# Patient Record
Sex: MALE | Race: WHITE | NOT HISPANIC OR LATINO | Employment: FULL TIME | URBAN - METROPOLITAN AREA
[De-identification: names, ages, dates, MRNs, and addresses within clinical notes are randomized per-mention and may not be internally consistent; named-entity substitution may affect disease eponyms.]

---

## 2018-08-06 ENCOUNTER — HOSPITAL ENCOUNTER (EMERGENCY)
Facility: HOSPITAL | Age: 43
Discharge: HOME | End: 2018-08-06
Admitting: EMERGENCY MEDICINE

## 2018-08-06 VITALS
DIASTOLIC BLOOD PRESSURE: 91 MMHG | HEIGHT: 74 IN | HEART RATE: 92 BPM | TEMPERATURE: 97.9 F | BODY MASS INDEX: 28.23 KG/M2 | SYSTOLIC BLOOD PRESSURE: 155 MMHG | WEIGHT: 220 LBS | RESPIRATION RATE: 18 BRPM | OXYGEN SATURATION: 97 %

## 2018-08-06 DIAGNOSIS — M77.02 MEDIAL EPICONDYLITIS OF ELBOW, LEFT: Primary | ICD-10-CM

## 2018-08-06 PROCEDURE — 96372 THER/PROPH/DIAG INJ SC/IM: CPT

## 2018-08-06 PROCEDURE — 99283 EMERGENCY DEPT VISIT LOW MDM: CPT

## 2018-08-06 PROCEDURE — 93005 ELECTROCARDIOGRAM TRACING: CPT

## 2018-08-06 RX ORDER — KETOROLAC TROMETHAMINE 30 MG/ML
30 INJECTION, SOLUTION INTRAMUSCULAR; INTRAVENOUS ONCE
Status: COMPLETED | OUTPATIENT
Start: 2018-08-06 | End: 2018-08-06

## 2018-08-06 RX ORDER — HYDROCODONE BITARTRATE AND ACETAMINOPHEN 5; 325 MG/1; MG/1
1 TABLET ORAL EVERY 6 HOURS PRN
Qty: 20 TABLET | Refills: 0 | Status: SHIPPED | OUTPATIENT
Start: 2018-08-06 | End: 2018-08-16

## 2018-08-06 RX ORDER — NAPROXEN 500 MG/1
500 TABLET ORAL 2 TIMES DAILY WITH MEALS
Qty: 10 TABLET | Refills: 0 | Status: SHIPPED | OUTPATIENT
Start: 2018-08-06 | End: 2019-02-14 | Stop reason: ALTCHOICE

## 2018-08-06 RX ORDER — LIDOCAINE 50 MG/G
1 PATCH TOPICAL ONCE
Status: DISCONTINUED | OUTPATIENT
Start: 2018-08-06 | End: 2018-08-06 | Stop reason: HOSPADM

## 2018-08-06 RX ORDER — HYDROCODONE BITARTRATE AND ACETAMINOPHEN 5; 325 MG/1; MG/1
1 TABLET ORAL ONCE
Status: COMPLETED | OUTPATIENT
Start: 2018-08-06 | End: 2018-08-06

## 2018-08-06 RX ADMIN — KETOROLAC TROMETHAMINE 30 MG: 30 INJECTION, SOLUTION INTRAMUSCULAR at 07:03

## 2018-08-06 RX ADMIN — HYDROCODONE BITARTRATE AND ACETAMINOPHEN 1 TABLET: 5; 325 TABLET ORAL at 07:00

## 2018-08-06 RX ADMIN — LIDOCAINE 1 PATCH: 50 PATCH TOPICAL at 07:02

## 2018-08-06 NOTE — ED PROVIDER NOTES
History  Chief Complaint   Patient presents with    Arm Pain     Patient stated that he woke up this morning around 0230 with left arm pain  denied injury     80-year-old male patient presents to the emergency department with a chief complaint of left elbow pain  He appears to be moderately to severely uncomfortable he denies any trauma  He does do personal care reportedly  He has significant amount of tenderness over the left medial epicondyle suggestive of epicondylitis  Has associated radicular pain as well on the ulnar nerve  None       History reviewed  No pertinent past medical history  Past Surgical History:   Procedure Laterality Date    BACK SURGERY      HERNIA REPAIR      KNEE ARTHROSCOPY      ROTATOR CUFF REPAIR      TONSILLECTOMY         History reviewed  No pertinent family history  I have reviewed and agree with the history as documented  Social History   Substance Use Topics    Smoking status: Current Every Day Smoker     Packs/day: 0 50     Types: Cigarettes    Smokeless tobacco: Never Used    Alcohol use No        Review of Systems   Constitutional: Negative for diaphoresis, fatigue and fever  HENT: Negative for congestion, ear pain, nosebleeds and sore throat  Eyes: Negative for photophobia, pain, discharge and visual disturbance  Respiratory: Negative for cough, choking, chest tightness, shortness of breath and wheezing  Cardiovascular: Negative for chest pain and palpitations  Gastrointestinal: Negative for abdominal distention, abdominal pain, diarrhea and vomiting  Genitourinary: Negative for dysuria, flank pain and frequency  Musculoskeletal: Negative for back pain, gait problem and joint swelling  Skin: Negative for color change and rash  Neurological: Negative for dizziness, syncope and headaches  Psychiatric/Behavioral: Negative for behavioral problems and confusion  The patient is not nervous/anxious      All other systems reviewed and are negative  Physical Exam  Physical Exam   Constitutional: He is oriented to person, place, and time  He appears well-developed and well-nourished  HENT:   Head: Normocephalic and atraumatic  Eyes: Pupils are equal, round, and reactive to light  Neck: Normal range of motion  Neck supple  Cardiovascular: Normal rate, regular rhythm, normal heart sounds and normal pulses  PMI is not displaced  Pulmonary/Chest: Effort normal and breath sounds normal  No respiratory distress  Abdominal: Soft  He exhibits no distension  There is no guarding  Musculoskeletal:        Left elbow: He exhibits decreased range of motion (Due to pain)  Tenderness found  Medial epicondyle tenderness noted  Lymphadenopathy:     He has no cervical adenopathy  Neurological: He is alert and oriented to person, place, and time  Skin: Skin is warm and dry  No rash noted  He is not diaphoretic  No pallor  Psychiatric: He has a normal mood and affect  Vitals reviewed        Vital Signs  ED Triage Vitals   Temperature Pulse Respirations Blood Pressure SpO2   08/06/18 0618 08/06/18 0617 08/06/18 0617 08/06/18 0617 08/06/18 0617   97 9 °F (36 6 °C) 104 (!) 11 (!) 193/118 99 %      Temp Source Heart Rate Source Patient Position - Orthostatic VS BP Location FiO2 (%)   08/06/18 0618 08/06/18 0617 08/06/18 0617 08/06/18 0617 --   Oral Monitor Lying Right arm       Pain Score       08/06/18 0617       Worst Possible Pain           Vitals:    08/06/18 0617 08/06/18 0619 08/06/18 0630   BP: (!) 193/118 (!) 173/94 155/91   Pulse: 104 102 100   Patient Position - Orthostatic VS: Lying Lying        Visual Acuity      ED Medications  Medications   lidocaine (LIDODERM) 5 % patch 1 patch (1 patch Transdermal Medication Applied 8/6/18 0702)   ketorolac (TORADOL) injection 30 mg (30 mg Intramuscular Given 8/6/18 0703)   HYDROcodone-acetaminophen (NORCO) 5-325 mg per tablet 1 tablet (1 tablet Oral Given 8/6/18 0700)       Diagnostic Studies  Results Reviewed     None                 No orders to display              Procedures  Procedures       Phone Contacts  ED Phone Contact    ED Course                               MDM  Number of Diagnoses or Management Options  Medial epicondylitis of elbow, left: new and requires workup  Diagnosis management comments: Moderate severe epicondylitis recommend orthopedic brace as well as follow up with physical therapy and Sports Medicine  Patient Progress  Patient progress: stable    CritCare Time    Disposition  Final diagnoses:   Medial epicondylitis of elbow, left     Time reflects when diagnosis was documented in both MDM as applicable and the Disposition within this note     Time User Action Codes Description Comment    8/6/2018  6:46 AM Marck Stuart Add [M77 02] Medial epicondylitis of elbow, left       ED Disposition     ED Disposition Condition Comment    Discharge  Lacretia Snooks 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      Condition at discharge: Good        Follow-up Information     Follow up With Specialties Details Why [address], DO Sports Medicine Schedule an appointment as soon as possible for a visit For Continued Evaluation [address] [place] [address] [employer]  [address]  [facility] [address]  [phone]            Patient's Medications   Discharge Prescriptions    HYDROCODONE-ACETAMINOPHEN (NORCO) 5-325 MG PER TABLET    Take 1 tablet by mouth every 6 (six) hours as needed (Not relieved by Anti-inflammatory) for up to 10 days Max Daily Amount: 4 tablets       Start Date: 8/6/2018  End Date: 8/16/2018       Order Dose: 1 tablet       Quantity: 20 tablet    Refills: 0    NAPROXEN (NAPROSYN) 500 MG TABLET    Take 1 tablet (500 mg total) by mouth 2 (two) times a day with meals for 5 days       Start Date: 8/6/2018  End Date: 8/11/2018       Order Dose: 500 mg       Quantity: 10 tablet    Refills: 0       Outpatient Discharge Orders  Tennis elbow strap         ED Provider  Electronically Signed by           Herminio Montague  08/06/18 0165

## 2018-08-06 NOTE — DISCHARGE INSTRUCTIONS
Tennis Elbow   WHAT YOU NEED TO KNOW:   What is tennis elbow? Tennis elbow is inflammation of the tendons in your elbow  Tendons are strong tissues that connect muscle to bone  What causes tennis elbow? Tennis elbow is caused by overuse of the muscles in your forearm  These muscles are used to straighten your arm or lift your hand and wrist  Fast, repeated arm movements can lead to inflammation and small tears in your tendon  Tennis, painting, and manual labor are common activities that can cause tennis elbow  What are the signs and symptoms of tennis elbow? · Pain on the side of your elbow that travels to your upper arm, forearm, or fingers    · Weakness in your wrist or hand    · Trouble holding, lifting, or grabbing an object, such as a coffee cup    · Red, swollen, or warm skin on the outside of your elbow  How is tennis elbow diagnosed? Your healthcare provider will feel around your elbow to check for painful areas  He will check the movement of your elbow, wrist, and fingers  An x-ray, ultrasound, or MRI may show tendon damage  You may be given contrast liquid to help the tendons show up better in the pictures  Tell the healthcare provider if you have ever had an allergic reaction to contrast liquid  Do not enter the MRI room with anything metal  Metal can cause serious injury  Tell the healthcare provider if you have any metal in or on your body  How is tennis elbow treated? · Support devices  may be needed to limit your arm movement  Examples include an arm strap, brace, or splint  These devices also help decrease pain and prevent more damage to your tendon  · Acetaminophen  decreases pain and fever  It is available without a doctor's order  Ask how much to take and how often to take it  Follow directions   Read the labels of all other medicines you are using to see if they also contain acetaminophen, or ask your doctor or pharmacist  Acetaminophen can cause liver damage if not taken correctly  Do not use more than 4 grams (4,000 milligrams) total of acetaminophen in one day  · NSAIDs , such as ibuprofen, help decrease swelling, pain, and fever  This medicine is available with or without a doctor's order  NSAIDs can cause stomach bleeding or kidney problems in certain people  If you take blood thinner medicine, always ask your healthcare provider if NSAIDs are safe for you  Always read the medicine label and follow directions  · A steroid injection  will help decrease pain and swelling  · Physical therapy  may be recommended  A physical therapist teaches you exercises to help improve movement and strength, and to decrease pain  · Surgery  may be needed if your symptoms do not improve with other treatments  During surgery, your healthcare provider will remove any damaged tissue  He may also cut your tendon and reattach it  How can I manage my symptoms? · Rest  your injured arm and avoid activities that cause pain  This will help your tendons heal     · Apply ice  on your elbow for 15 to 20 minutes every hour or as directed  Use an ice pack, or put crushed ice in a plastic bag  Cover it with a towel before you apply it to your skin  Ice helps prevent tissue damage and decreases swelling and pain  · Elevate  your elbow above the level of your heart as often as you can  This will help decrease swelling and pain  Prop your elbow on pillows or blankets to keep it elevated comfortably  When should I seek immediate care? · You suddenly have no feeling in your arm, hand, or fingers  · You suddenly cannot move your arm, wrist, hand, or fingers  When should I contact my healthcare provider? · Your symptoms do not get better within 2 weeks, even with treatment  · You have more pain or weakness in your arm, wrist, hand, or fingers  · You have new numbness or tingling in your arm, hand, or fingers  · You have questions or concerns about your condition or care    CARE AGREEMENT:   You have the right to help plan your care  Learn about your health condition and how it may be treated  Discuss treatment options with your caregivers to decide what care you want to receive  You always have the right to refuse treatment  The above information is an  only  It is not intended as medical advice for individual conditions or treatments  Talk to your doctor, nurse or pharmacist before following any medical regimen to see if it is safe and effective for you  © 2017 [facility] [employer] Information is for End User's use only and may not be sold, redistributed or otherwise used for commercial purposes  All illustrations and images included in CareNotes® are the copyrighted property of [employer]  or Braden Trevino

## 2018-08-07 LAB
ATRIAL RATE: 105 BPM
P AXIS: 75 DEGREES
PR INTERVAL: 122 MS
QRS AXIS: 83 DEGREES
QRSD INTERVAL: 94 MS
QT INTERVAL: 344 MS
QTC INTERVAL: 454 MS
T WAVE AXIS: 55 DEGREES
VENTRICULAR RATE: 105 BPM

## 2018-08-07 PROCEDURE — 93010 ELECTROCARDIOGRAM REPORT: CPT | Performed by: INTERNAL MEDICINE

## 2018-11-04 ENCOUNTER — HOSPITAL ENCOUNTER (EMERGENCY)
Facility: HOSPITAL | Age: 43
Discharge: HOME | End: 2018-11-04
Attending: EMERGENCY MEDICINE | Admitting: EMERGENCY MEDICINE
Payer: COMMERCIAL

## 2018-11-04 VITALS
TEMPERATURE: 98.5 F | SYSTOLIC BLOOD PRESSURE: 181 MMHG | DIASTOLIC BLOOD PRESSURE: 100 MMHG | OXYGEN SATURATION: 100 % | HEART RATE: 116 BPM | RESPIRATION RATE: 18 BRPM

## 2018-11-04 DIAGNOSIS — L02.412 ABSCESS OF AXILLA, LEFT: Primary | ICD-10-CM

## 2018-11-04 PROCEDURE — 99282 EMERGENCY DEPT VISIT SF MDM: CPT

## 2018-11-04 RX ORDER — CEPHALEXIN 500 MG/1
500 CAPSULE ORAL 2 TIMES DAILY
Qty: 20 CAPSULE | Refills: 0 | Status: SHIPPED | OUTPATIENT
Start: 2018-11-04 | End: 2018-11-14

## 2018-11-04 RX ORDER — CEPHALEXIN 250 MG/1
500 CAPSULE ORAL ONCE
Status: COMPLETED | OUTPATIENT
Start: 2018-11-04 | End: 2018-11-04

## 2018-11-04 RX ORDER — ACETAMINOPHEN 325 MG/1
650 TABLET ORAL ONCE
Status: COMPLETED | OUTPATIENT
Start: 2018-11-04 | End: 2018-11-04

## 2018-11-04 RX ORDER — LIDOCAINE 40 MG/G
CREAM TOPICAL ONCE
Status: COMPLETED | OUTPATIENT
Start: 2018-11-04 | End: 2018-11-04

## 2018-11-04 RX ORDER — SULFAMETHOXAZOLE AND TRIMETHOPRIM 800; 160 MG/1; MG/1
1 TABLET ORAL 2 TIMES DAILY
Qty: 20 TABLET | Refills: 0 | Status: SHIPPED | OUTPATIENT
Start: 2018-11-04 | End: 2018-11-14

## 2018-11-04 RX ORDER — LIDOCAINE HYDROCHLORIDE 10 MG/ML
5 INJECTION, SOLUTION EPIDURAL; INFILTRATION; INTRACAUDAL; PERINEURAL ONCE
Status: COMPLETED | OUTPATIENT
Start: 2018-11-04 | End: 2018-11-04

## 2018-11-04 RX ORDER — IBUPROFEN 600 MG/1
600 TABLET ORAL ONCE
Status: COMPLETED | OUTPATIENT
Start: 2018-11-04 | End: 2018-11-04

## 2018-11-04 RX ORDER — SULFAMETHOXAZOLE AND TRIMETHOPRIM 800; 160 MG/1; MG/1
1 TABLET ORAL ONCE
Status: COMPLETED | OUTPATIENT
Start: 2018-11-04 | End: 2018-11-04

## 2018-11-04 RX ORDER — IBUPROFEN 600 MG/1
600 TABLET ORAL EVERY 6 HOURS PRN
Qty: 30 TABLET | Refills: 0 | Status: SHIPPED | OUTPATIENT
Start: 2018-11-04 | End: 2019-01-10

## 2018-11-04 RX ADMIN — LIDOCAINE HYDROCHLORIDE 5 ML: 10 INJECTION, SOLUTION EPIDURAL; INFILTRATION; INTRACAUDAL; PERINEURAL at 17:51

## 2018-11-04 RX ADMIN — SULFAMETHOXAZOLE AND TRIMETHOPRIM 1 TABLET: 800; 160 TABLET ORAL at 17:51

## 2018-11-04 RX ADMIN — IBUPROFEN 600 MG: 600 TABLET ORAL at 17:51

## 2018-11-04 RX ADMIN — LIDOCAINE: 40 CREAM TOPICAL at 17:51

## 2018-11-04 RX ADMIN — CEPHALEXIN 500 MG: 250 CAPSULE ORAL at 17:51

## 2018-11-04 RX ADMIN — ACETAMINOPHEN 650 MG: 325 TABLET, FILM COATED ORAL at 17:51

## 2018-11-04 NOTE — DISCHARGE INSTRUCTIONS
Abscess   WHAT YOU NEED TO KNOW:   A warm compress may help your abscess drain  Your healthcare provider may make a cut in the abscess so it can drain  You may need surgery to remove an abscess that is on your hands or buttocks  DISCHARGE INSTRUCTIONS:   Return to the emergency department if:   · The area around your abscess becomes very painful, warm, or has red streaks  · You have a fever and chills  · Your heart is beating faster than usual      · You feel faint or confused  Contact your healthcare provider if:   · Your abscess gets bigger or does not get better  · Your abscess returns  · You have questions or concerns about your condition or care  Medicines: You may  need any of the following:  · Antibiotics  help treat a bacterial infection  · Acetaminophen  decreases pain and fever  It is available without a doctor's order  Ask how much to take and how often to take it  Follow directions  Acetaminophen can cause liver damage if not taken correctly  · NSAIDs , such as ibuprofen, help decrease swelling, pain, and fever  This medicine is available with or without a doctor's order  NSAIDs can cause stomach bleeding or kidney problems in certain people  If you take blood thinner medicine, always ask your healthcare provider if NSAIDs are safe for you  Always read the medicine label and follow directions  · Take your medicine as directed  Contact your healthcare provider if you think your medicine is not helping or if you have side effects  Tell him or her if you are allergic to any medicine  Keep a list of the medicines, vitamins, and herbs you take  Include the amounts, and when and why you take them  Bring the list or the pill bottles to follow-up visits  Carry your medicine list with you in case of an emergency  Self-care:   · Apply a warm compress to your abscess  This will help it open and drain  Wet a washcloth in warm, but not hot, water  Apply the compress for 10 minutes  Repeat this 4 times each day  Do not  press on an abscess or try to open it with a needle  You may push the bacteria deeper or into your blood  · Do not share your clothes, towels, or sheets with anyone  This can spread the infection to others  · Wash your hands often  This can help prevent the spread of germs  Use soap and water or an alcohol-based hand rub  Care for your wound after it is drained:   · Care for your wound as directed  If your healthcare provider says it is okay, carefully remove the bandage and gauze packing  You may need to soak the gauze to get it out of your wound  Clean your wound and the area around it as directed  Dry the area and put on new, clean bandages  Change your bandages when they get wet or dirty  · Ask your healthcare provider how to change the gauze in your wound  Keep track of how many pieces of gauze are placed inside the wound  Do not put too much packing in the wound  Do not pack the gauze too tightly in your wound  Follow up with your healthcare provider in 1 to 3 days: You may need to have your packing removed or your bandage changed  Write down your questions so you remember to ask them during your visits  © 2017 [address]  Information is for End User's use only and may not be sold, redistributed or otherwise used for commercial purposes  All illustrations and images included in CareNotes® are the copyrighted property of [employer]  or Braden Trevino  The above information is an  only  It is not intended as medical advice for individual conditions or treatments  Talk to your doctor, nurse or pharmacist before following any medical regimen to see if it is safe and effective for you

## 2018-11-04 NOTE — ED PROVIDER NOTES
History  Chief Complaint   Patient presents with    Abscess     abscess under L armpit onset last week        History provided by:  Patient  Abscess   Location:  Shoulder/arm  Shoulder/arm abscess location:  L axilla  Size:  3  Abscess quality: fluctuance    Red streaking: no    Duration:  1 week  Progression:  Worsening  Chronicity:  New  Context: not diabetes, not immunosuppression, not injected drug use, not insect bite/sting and not skin injury    Relieved by:  Nothing  Worsened by:  Nothing  Ineffective treatments:  None tried  Associated symptoms: no fever, no headaches, no nausea and no vomiting    Risk factors: no family hx of MRSA, no hx of MRSA and no prior abscess        Prior to Admission Medications   Prescriptions Last Dose Informant Patient Reported? Taking?   naproxen (NAPROSYN) 500 mg tablet   No No   Sig: Take 1 tablet (500 mg total) by mouth 2 (two) times a day with meals for 5 days      Facility-Administered Medications: None       History reviewed  No pertinent past medical history  Past Surgical History:   Procedure Laterality Date    BACK SURGERY      HERNIA REPAIR      KNEE ARTHROSCOPY      ROTATOR CUFF REPAIR      TONSILLECTOMY         History reviewed  No pertinent family history  I have reviewed and agree with the history as documented  Social History   Substance Use Topics    Smoking status: Current Every Day Smoker     Packs/day: 0 50     Types: Cigarettes    Smokeless tobacco: Never Used    Alcohol use No        Review of Systems   Constitutional: Negative for fever  Gastrointestinal: Negative for nausea and vomiting  Neurological: Negative for headaches  All other systems reviewed and are negative  Physical Exam  Physical Exam   Constitutional: He appears well-developed and well-nourished  HENT:   Head: Normocephalic  Eyes: Pupils are equal, round, and reactive to light  EOM are normal    Neck: Neck supple  Cardiovascular: Regular rhythm    Tachycardia present  Pulmonary/Chest: Effort normal and breath sounds normal  No respiratory distress  He has no wheezes  Abdominal: Soft  Neurological: He is alert  Skin: Lesion (left axilla 4cm with fluctuance, without proximal streaking ) noted  No abrasion and no ecchymosis noted  Vitals reviewed        Vital Signs  ED Triage Vitals   Temperature Pulse Respirations Blood Pressure SpO2   11/04/18 1705 11/04/18 1704 11/04/18 1704 11/04/18 1704 11/04/18 1704   98 5 °F (36 9 °C) (!) 116 18 (!) 181/100 100 %      Temp Source Heart Rate Source Patient Position - Orthostatic VS BP Location FiO2 (%)   11/04/18 1705 11/04/18 1704 11/04/18 1704 11/04/18 1704 --   Oral Monitor Sitting Right arm       Pain Score       11/04/18 1704       Worst Possible Pain           Vitals:    11/04/18 1704   BP: (!) 181/100   Pulse: (!) 116   Patient Position - Orthostatic VS: Sitting       Visual Acuity      ED Medications  Medications   lidocaine (LMX) 4 % cream ( Topical Given 11/4/18 1751)   lidocaine (PF) (XYLOCAINE-MPF) 1 % injection 5 mL (5 mL Infiltration Given 11/4/18 1751)   cephalexin (KEFLEX) capsule 500 mg (500 mg Oral Given 11/4/18 1751)   sulfamethoxazole-trimethoprim (BACTRIM DS) 800-160 mg per tablet 1 tablet (1 tablet Oral Given 11/4/18 1751)   ibuprofen (MOTRIN) tablet 600 mg (600 mg Oral Given 11/4/18 1751)   acetaminophen (TYLENOL) tablet 650 mg (650 mg Oral Given 11/4/18 1751)       Diagnostic Studies  Results Reviewed     None                 No orders to display              Procedures  Incision/Drainage  Date/Time: 11/4/2018 6:07 PM  Performed by: Ame Jang by: Patricia Martini     Patient location:  ED  Consent:     Consent obtained:  Verbal    Consent given by:  Patient  Universal protocol:     Patient identity confirmed:  Verbally with patient  Location:     Type:  Abscess    Size:  4    Location:  Upper extremity    Upper extremity location:  L arm (left axilla)  Pre-procedure details: Skin preparation:  Betadine  Anesthesia (see MAR for exact dosages): Anesthesia method:  Topical application and local infiltration    Topical anesthetic:  Lidocaine gel    Local anesthetic:  Lidocaine 1% w/o epi  Procedure details:     Complexity:  Simple    Incision types:  Stab incision    Scalpel blade:  10    Incision depth:  Skin    Wound management:  Probed and deloculated    Drainage:  Purulent    Drainage amount: Moderate    Wound treatment:  Wound left open    Packing materials:  None  Post-procedure details:     Patient tolerance of procedure: Tolerated well, no immediate complications             Phone Contacts  ED Phone Contact    ED Course                               MDM  Number of Diagnoses or Management Options  Abscess of axilla, left: new and does not require workup    CritCare Time    Disposition  Final diagnoses:   Abscess of axilla, left     Time reflects when diagnosis was documented in both MDM as applicable and the Disposition within this note     Time User Action Codes Description Comment    11/4/2018  6:06 PM Ofelia MIRELES Add [L02 [id number]] Abscess of axilla, left       ED Disposition     ED Disposition Condition Comment    Discharge  Dru Driver 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      Condition at discharge: Good        Follow-up Information     Follow up With Specialties Details Why Contact Info Additional [address] [facility] Emergency Medicine  If symptoms worsen [address] [facility] [address]  [phone] [place] [facility], [address] [place] [address], [facility]          Patient's Medications   Discharge Prescriptions    CEPHALEXIN (KEFLEX) 500 MG CAPSULE    Take 1 capsule (500 mg total) by mouth 2 (two) times a day for 10 days       Start Date: 11/4/2018 End Date: 11/14/2018       Order Dose: 500 mg       Quantity: 20 capsule    Refills: 0    IBUPROFEN (MOTRIN) 600 MG TABLET    Take 1 tablet (600 mg total) by mouth every 6 (six) hours as needed for mild pain for up to 10 days       Start Date: 11/4/2018 End Date: 11/14/2018       Order Dose: 600 mg       Quantity: 30 tablet    Refills: 0    SULFAMETHOXAZOLE-TRIMETHOPRIM (BACTRIM DS) 800-160 MG PER TABLET    Take 1 tablet by mouth 2 (two) times a day for 10 days smx-tmp DS (BACTRIM) 800-160 mg tabs (1tab q12 D10)       Start Date: 11/4/2018 End Date: 11/14/2018       Order Dose: 1 tablet       Quantity: 20 tablet    Refills: 0     No discharge procedures on file      ED Provider  Electronically Signed by           Bharathi Novak MD  11/04/18 0238

## 2019-01-07 ENCOUNTER — APPOINTMENT (EMERGENCY)
Dept: RADIOLOGY | Facility: HOSPITAL | Age: 44
End: 2019-01-07
Payer: COMMERCIAL

## 2019-01-07 ENCOUNTER — HOSPITAL ENCOUNTER (EMERGENCY)
Facility: HOSPITAL | Age: 44
Discharge: HOME | End: 2019-01-07
Attending: EMERGENCY MEDICINE
Payer: COMMERCIAL

## 2019-01-07 VITALS
HEIGHT: 74 IN | TEMPERATURE: 97.8 F | HEART RATE: 96 BPM | SYSTOLIC BLOOD PRESSURE: 145 MMHG | WEIGHT: 219.8 LBS | RESPIRATION RATE: 18 BRPM | OXYGEN SATURATION: 97 % | BODY MASS INDEX: 28.21 KG/M2 | DIASTOLIC BLOOD PRESSURE: 95 MMHG

## 2019-01-07 DIAGNOSIS — M54.12 CERVICAL RADICULITIS: Primary | ICD-10-CM

## 2019-01-07 DIAGNOSIS — M25.522 LEFT ELBOW PAIN: ICD-10-CM

## 2019-01-07 PROCEDURE — 73080 X-RAY EXAM OF ELBOW: CPT

## 2019-01-07 PROCEDURE — 99283 EMERGENCY DEPT VISIT LOW MDM: CPT

## 2019-01-07 PROCEDURE — 96372 THER/PROPH/DIAG INJ SC/IM: CPT

## 2019-01-07 RX ORDER — METHOCARBAMOL 750 MG/1
750 TABLET, FILM COATED ORAL EVERY 6 HOURS PRN
Qty: 20 TABLET | Refills: 0 | Status: SHIPPED | OUTPATIENT
Start: 2019-01-07 | End: 2019-02-14 | Stop reason: ALTCHOICE

## 2019-01-07 RX ORDER — PREDNISONE 20 MG/1
40 TABLET ORAL ONCE
Status: COMPLETED | OUTPATIENT
Start: 2019-01-07 | End: 2019-01-07

## 2019-01-07 RX ORDER — OXYCODONE HYDROCHLORIDE AND ACETAMINOPHEN 5; 325 MG/1; MG/1
1 TABLET ORAL ONCE
Status: COMPLETED | OUTPATIENT
Start: 2019-01-07 | End: 2019-01-07

## 2019-01-07 RX ORDER — OXYCODONE HYDROCHLORIDE 10 MG/1
10 TABLET ORAL EVERY 6 HOURS PRN
Qty: 12 TABLET | Refills: 0 | Status: SHIPPED | OUTPATIENT
Start: 2019-01-07 | End: 2019-01-17

## 2019-01-07 RX ORDER — KETOROLAC TROMETHAMINE 30 MG/ML
30 INJECTION, SOLUTION INTRAMUSCULAR; INTRAVENOUS ONCE
Status: COMPLETED | OUTPATIENT
Start: 2019-01-07 | End: 2019-01-07

## 2019-01-07 RX ORDER — HYDROMORPHONE HCL/PF 1 MG/ML
0.5 SYRINGE (ML) INJECTION ONCE
Status: COMPLETED | OUTPATIENT
Start: 2019-01-07 | End: 2019-01-07

## 2019-01-07 RX ORDER — PREDNISONE 1 MG/1
TABLET ORAL
Qty: 21 TABLET | Refills: 0 | Status: SHIPPED | OUTPATIENT
Start: 2019-01-07 | End: 2019-02-14

## 2019-01-07 RX ADMIN — HYDROMORPHONE HYDROCHLORIDE 0.5 MG: 1 INJECTION, SOLUTION INTRAMUSCULAR; INTRAVENOUS; SUBCUTANEOUS at 15:52

## 2019-01-07 RX ADMIN — PREDNISONE 40 MG: 20 TABLET ORAL at 15:51

## 2019-01-07 RX ADMIN — OXYCODONE HYDROCHLORIDE AND ACETAMINOPHEN 1 TABLET: 5; 325 TABLET ORAL at 14:37

## 2019-01-07 RX ADMIN — KETOROLAC TROMETHAMINE 30 MG: 30 INJECTION, SOLUTION INTRAMUSCULAR at 15:51

## 2019-01-07 NOTE — DISCHARGE INSTRUCTIONS
Cervical Radiculopathy   WHAT YOU NEED TO KNOW:   Cervical radiculopathy is a painful condition that happens when a spinal nerve in your neck is pinched or irritated  DISCHARGE INSTRUCTIONS:   Medicines: You may need any of the following:  · NSAIDs  help decrease swelling and pain  This medicine can be bought without a doctor's order  This medicine can cause stomach bleeding or kidney problems in certain people  If you take blood thinner medicine, always ask your healthcare provider if NSAIDs are safe for you  Always read the medicine label and follow the directions on it before using this medicine  · Prescription pain medicine  helps decrease pain  Do not wait until the pain is severe before you take this medicine  · Steroids  help decrease pain and swelling  These may be given as a pill or as an injection in your neck  You may need more than 1 injection if your symptoms do not improve after the first treatment  · Take your medicine as directed  Contact your healthcare provider if you think your medicine is not helping or if you have side effects  Tell him of her if you are allergic to any medicine  Keep a list of the medicines, vitamins, and herbs you take  Include the amounts, and when and why you take them  Bring the list or the pill bottles to follow-up visits  Carry your medicine list with you in case of an emergency  Follow up with your healthcare provider or spine specialist as directed:  Write down your questions so you remember to ask them during your visits  Physical therapy:  Your healthcare provider may suggest physical therapy to stretch and strengthen your muscles  Your physical therapist can teach you how to improve your posture and the way you hold your neck  He may also teach you how to be safely active and avoid further injury  He can also help you develop an exercise program that is safe for your back and neck  Self-care:   · Ice  helps decrease swelling and pain   Ice may also help prevent tissue damage  Use an ice pack, or put crushed ice in a plastic bag  Cover it with a towel and place it on your neck for 15 to 20 minutes every hour or as directed  · Rest  when you feel it is needed  Slowly start to do more each day  Return to your daily activities as directed  · Wear a soft collar  You may be given a soft collar to support your neck while you sleep  Wear the soft collar only as directed  · Do light stretches and regular exercise  Your healthcare provider may suggest light stretches to help decrease stiffness in your neck and arm as you recover  After your pain is controlled, you may benefit from regular exercise  Ask what type of exercise is safe for your back and neck  · Review your work area  A comfortable work area can help prevent neck strain  Ask your employer for an ergonomic review to check the position of your desk, chair, phone, and computer  Make any necessary adjustments for your comfort  Contact your healthcare provider or spine specialist if:   · You have a fever  · You are losing weight without trying  · Your pain is worse, even with medicine  · One or both hands feel more numb than before, or you cannot move your fingers well  · You have questions or concerns about your condition or care  © 2017 [employer]  Information is for End User's use only and may not be sold, redistributed or otherwise used for commercial purposes  All illustrations and images included in CareNotes® are the copyrighted property of [email or web address]  or Braden Trevino  The above information is an  only  It is not intended as medical advice for individual conditions or treatments  Talk to your doctor, nurse or pharmacist before following any medical regimen to see if it is safe and effective for you        Cubital Tunnel Syndrome   WHAT YOU NEED TO KNOW:   Cubital tunnel syndrome is a condition where there is increased pressure on the ulnar nerve in your elbow  The ulnar nerve controls muscles and feeling in the hand  Cubital tunnel syndrome may be caused by direct pressure, stretching, or decreased blood flow to the ulnar nerve  DISCHARGE INSTRUCTIONS:   Medicines:   · NSAIDs:  These medicines decrease swelling and pain  NSAIDs are available without a doctor's order  Ask which medicine is right for you and how much to take  Take as directed  NSAIDs can cause stomach bleeding or kidney problems if not taken correctly  · Take your medicine as directed  Contact your healthcare provider if you think your medicine is not helping or if you have side effects  Tell him of her if you are allergic to any medicine  Keep a list of the medicines, vitamins, and herbs you take  Include the amounts, and when and why you take them  Bring the list or the pill bottles to follow-up visits  Carry your medicine list with you in case of an emergency  Follow up with your healthcare provider as directed:  Write down your questions so you remember to ask them during your visits  Manage your symptoms:   · Avoid putting pressure on your elbow:  Certain positions put pressure on the ulnar nerve in your elbow  Leaning or sleeping on your bent elbow can make your symptoms worse  · Apply ice:  Ice helps decrease swelling and pain  Ice may also help prevent tissue damage  Use an ice pack or put crushed ice in a plastic bag  Cover the ice pack with a towel and place it on the area for 15 to 20 minutes every hour  · Rest your arm:  You may need to rest your injured arm and avoid activities that cause your symptoms to allow your nerve to heal     · Get physical therapy:  A physical therapist can show you exercises to help improve movement and strength  Physical therapy can also help decrease pain and loss of function  · Use elbow splint or brace: You may need a brace or splint on your elbow to decrease your arm movement   This will help to keep pressure off your ulnar nerve  You may also need elbow pads to protect your elbow  Contact your healthcare provider if:   · Your symptoms get worse  · Your hand and fingers are so weak that you cannot grab, squeeze, or lift items  · You have questions or concerns about your condition or care  Return to the emergency department if:   · You suddenly lose feeling in your hand or fingers  · You cannot move your ring or little finger  © 2017 [facility] [facility] Information is for End User's use only and may not be sold, redistributed or otherwise used for commercial purposes  All illustrations and images included in CareNotes® are the copyrighted property of [employer]  or Braden Trevino  The above information is an  only  It is not intended as medical advice for individual conditions or treatments  Talk to your doctor, nurse or pharmacist before following any medical regimen to see if it is safe and effective for you

## 2019-01-07 NOTE — ED PROVIDER NOTES
History  Chief Complaint   Patient presents with    Arm Pain     Pt reports severe left arm pain  Radiates down into hand  Denies injury   Shoulder Pain     37 y o  male with no significant past medical history presents to ED with chief complaint of left elbow pain  Onset of symptoms reported as 2 days ago  Location of symptoms reported as left elbow  Quality is reported as sharp pain  Severity is reported as moderate to severe  Associated symptoms: denies paralysis, paraesthesias or weakness to left upper extremity, denies neck pain, denies headache, denies rash, denies fevers,  Reports left shoulder pain which has resolved  Reports left elbow pain occasionally radiated into left hand  Modifying factors: movement exacerbates pain  Context: patient reports he is right hand dominant  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  Denies any acute fall/injury/trauma to the area  Reports increasing pain in left elbow for the past 2 days, reports that he had some left sided neck/upper back and left shoulder pain a few days preceding current symptoms  Reports a similar episode of tennis elbow in the recent past  Tried OTC ibuprofen without relief of symptoms   Reviewed past medical history and visits via EPIC:  Patient last seen in ED on 11/4/2018 for treatment of left axillary abscess  History provided by:  Patient and relative   used: No        Prior to Admission Medications   Prescriptions Last Dose Informant Patient Reported? Taking?   naproxen (NAPROSYN) 500 mg tablet   No No   Sig: Take 1 tablet (500 mg total) by mouth 2 (two) times a day with meals for 5 days      Facility-Administered Medications: None       History reviewed  No pertinent past medical history  Past Surgical History:   Procedure Laterality Date    BACK SURGERY      HERNIA REPAIR      KNEE ARTHROSCOPY      ROTATOR CUFF REPAIR      TONSILLECTOMY         History reviewed   No pertinent family history  I have reviewed and agree with the history as documented  Social History   Substance Use Topics    Smoking status: Current Every Day Smoker     Packs/day: 0 50     Types: Cigarettes    Smokeless tobacco: Never Used    Alcohol use No        Review of Systems   Constitutional: Negative for activity change, appetite change, chills, diaphoresis, fatigue, fever and unexpected weight change  HENT: Negative for congestion, dental problem, drooling, ear discharge, ear pain, facial swelling, hearing loss, mouth sores, nosebleeds, postnasal drip, rhinorrhea, sinus pain, sinus pressure, sneezing, sore throat, tinnitus, trouble swallowing and voice change  Eyes: Negative for photophobia, pain, discharge, redness, itching and visual disturbance  Respiratory: Negative for cough, chest tightness, shortness of breath and wheezing  Cardiovascular: Negative for chest pain, palpitations and leg swelling  Gastrointestinal: Negative for abdominal distention, abdominal pain, constipation, diarrhea, nausea and vomiting  Endocrine: Negative for cold intolerance, heat intolerance, polydipsia, polyphagia and polyuria  Genitourinary: Negative for difficulty urinating, dysuria, flank pain, frequency, hematuria and urgency  Musculoskeletal: Positive for arthralgias  Negative for back pain, gait problem, joint swelling, myalgias, neck pain and neck stiffness  Skin: Negative for color change, pallor, rash and wound  Allergic/Immunologic: Negative for environmental allergies, food allergies and immunocompromised state  Neurological: Negative for dizziness, tremors, seizures, syncope, facial asymmetry, speech difficulty, weakness, light-headedness, numbness and headaches  Hematological: Negative for adenopathy  Does not bruise/bleed easily  Psychiatric/Behavioral: Negative for agitation, confusion and hallucinations  The patient is not nervous/anxious      All other systems reviewed and are negative  Physical Exam  Physical Exam   Constitutional: He is oriented to person, place, and time  He appears well-developed and well-nourished  No distress  /95 (BP Location: Right arm)   Pulse 96   Temp 97 8 °F (36 6 °C) (Oral)   Resp 18   Ht 6' 2" (1 88 m)   Wt 99 7 kg (219 lb 12 8 oz)   SpO2 97%   BMI 28 22 kg/m²    HENT:   Head: Normocephalic and atraumatic  Right Ear: External ear normal    Left Ear: External ear normal    Nose: Nose normal    Mouth/Throat: Oropharynx is clear and moist  No oropharyngeal exudate  Eyes: Pupils are equal, round, and reactive to light  Conjunctivae and EOM are normal  Right eye exhibits no discharge  Left eye exhibits no discharge  No scleral icterus  Neck: Normal range of motion  Neck supple  No tracheal deviation present  No thyromegaly present  Cardiovascular: Normal rate, regular rhythm and intact distal pulses  Pulmonary/Chest: Effort normal and breath sounds normal  No stridor  No respiratory distress  He has no wheezes  He has no rales  He exhibits no tenderness  Abdominal: Soft  Bowel sounds are normal  He exhibits no distension and no mass  There is no tenderness  There is no rebound and no guarding  Musculoskeletal: He exhibits tenderness  He exhibits no edema or deformity  Left upper extremity: normal inspection, no gross deformity  No rashes/effusions/redness/warmth/joint swelling present  Distal neurovascular tendon intact to left hand  Left radial pulse 2/4 normal   Capillary refill less than 3 seconds to all fingers  Strength 5/5 normal to all fingers  Flex/ext tendon function fully intact to all fingers  Left hand and wrist are normal to inspection, nontender to palpation with FROM  Left elbow demonstrates normal inspection, no gross deformity  There is reproducible tenderness to palpation to ulnar surface of elbow  Olecranon and radial surface of elbow are nontender to palpation  ROM to elbow is limited by pain  Left shoulder I normal to inspection, nontender to palpation with FROM  All compartment of forearm are soft, warm, well perfused  Lymphadenopathy:     He has no cervical adenopathy  Neurological: He is alert and oriented to person, place, and time  He displays normal reflexes  No cranial nerve deficit or sensory deficit  He exhibits normal muscle tone  Coordination normal    Skin: Skin is warm and dry  Capillary refill takes less than 2 seconds  No rash noted  He is not diaphoretic  No erythema  No pallor  Psychiatric: He has a normal mood and affect  His behavior is normal  Judgment and thought content normal    Nursing note and vitals reviewed  Vital Signs  ED Triage Vitals [01/07/19 1308]   Temperature Pulse Respirations Blood Pressure SpO2   97 8 °F (36 6 °C) 97 19 (!) 148/101 98 %      Temp Source Heart Rate Source Patient Position - Orthostatic VS BP Location FiO2 (%)   Oral Monitor Sitting Right arm --      Pain Score       Worst Possible Pain           Vitals:    01/07/19 1308 01/07/19 1457   BP: (!) 148/101 145/95   Pulse: 97 96   Patient Position - Orthostatic VS: Sitting Sitting       Visual Acuity      ED Medications  Medications   oxyCODONE-acetaminophen (PERCOCET) 5-325 mg per tablet 1 tablet (1 tablet Oral Given 1/7/19 1437)   ketorolac (TORADOL) injection 30 mg (30 mg Intramuscular Given 1/7/19 1551)   HYDROmorphone (DILAUDID) injection 0 5 mg (0 5 mg Subcutaneous Given 1/7/19 1552)   predniSONE tablet 40 mg (40 mg Oral Given 1/7/19 1551)       Diagnostic Studies  Results Reviewed     None                 XR elbow 3+ views LEFT   Final Result by Santiago Hair MD (01/07 1540)      No acute osseous abnormality              Workstation performed: [id number]                    Procedures  Procedures       Phone Contacts  ED Phone Contact    ED Course                               MDM  Number of Diagnoses or Management Options  Cervical radiculitis: new and requires workup  Left elbow pain: new and requires workup  Diagnosis management comments: ddx includes but is not limited to fracture, contusion, sprain, strain, nerve injury, tendon injury, vascular injury, tendinitis, bursitis, dislocation, plan xray to rule out fracture or dislocation  Xray images left elbow independently visualized and interpreted by me - no acute fracture or dislocation  I reviewed all test results with patient at bedside  Patients symptoms of ulnar surface elbow pain without trauma that radiates intermittently down the ulnar side of forearm into fifth and part of 4th finger suggest ulnar nerve peripheral neuropathy  There is no motor weakness on exam  No signs of infection (nec fasciitis) or compartment syndrome  Patient does relate that a few days prior to elbow pain he has left sided shoulder and neck/upper back pain  Discussed with patient suspect cervical radiculitis as source of symptoms  Discussed pathophysiology of cervical radiculitis and peripheral neuropathy with patient  Discussed use of nsaids, analgesics and muscle relaxers for treatment  Discussed outpatient follow up with pcp and sports medicine in 2-3 days for further evaluation of symptoms  Extensively reviewed reasons to return to ED  Reviewed reasons to return to ed  Patient verbalized understanding of diagnosis and agreement with discharge plan of care as well as understanding of reasons to return to ed  Standard narcotic precautions given              Amount and/or Complexity of Data Reviewed  Tests in the radiology section of CPT®: ordered and reviewed  Discussion of test results with the performing providers: yes  Obtain history from someone other than the patient: yes (relative)  Review and summarize past medical records: yes  Independent visualization of images, tracings, or specimens: yes    Patient Progress  Patient progress: stable    CritCare Time    Disposition  Final diagnoses:   Cervical radiculitis   Left elbow pain Time reflects when diagnosis was documented in both MDM as applicable and the Disposition within this note     Time User Action Codes Description Comment    1/7/2019  3:39 PM Bernadette Narvaez Add [U23 64] Cervical radiculitis     1/7/2019  3:39 PM Bernadette Narvaez Add [M25 522] Left elbow pain       ED Disposition     ED Disposition Condition Comment    Discharge  Carolyn New 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      Condition at discharge: Stable        Follow-up Information     Follow up With Specialties Details Why Contact Info Additional Information    Vicki Vee MD  Call in 1 day for further evaluation of symptoms [address] [facility]  [address] [facility], [address] Call in 1 day for further evaluation of symptoms 200 [facility] [address]01 [address] [facility] [address] [facility]       Echo Oswald MD Orthopedic Surgery Call in 1 day for further evaluation of symptoms 300 [facility] [address]       [address]25 [facility] Orthopedic Surgery Call in 1 day for further evaluation of symptoms [address] Emergency Department Emergency Medicine Go to If symptoms worsen 100 [facility]  [phone] [place] ED, [address] [place], [place], [address]          Discharge Medication List as of 1/7/2019  3:42 PM      START taking these medications    Details   methocarbamol (ROBAXIN) 750 mg tablet Take 1 tablet (750 mg total) by mouth every 6 (six) hours as needed for muscle spasms for up to 5 days, Starting Mon 1/7/2019, Until Sat 1/12/2019, Print      oxyCODONE (ROXICODONE) 10 MG TABS Take 1 tablet (10 mg total) by mouth every 6 (six) hours as needed for moderate pain (cervical radiculitis/initial rx ) for up to 10 days Max Daily Amount: 40 mg, Starting Mon 1/7/2019, Until Thu 1/17/2019, Print predniSONE 5 mg tablet 40 mg PO day 1, then 30 mg PO day 2, 20 mg PO day 3, 10 mg PO day 4, 5 mg PO day 5 then stop, Print         CONTINUE these medications which have NOT CHANGED    Details   naproxen (NAPROSYN) 500 mg tablet Take 1 tablet (500 mg total) by mouth 2 (two) times a day with meals for 5 days, Starting Mon 8/6/2018, Until Sat 8/11/2018, Print      ibuprofen (MOTRIN) 600 mg tablet Take 1 tablet (600 mg total) by mouth every 6 (six) hours as needed for mild pain for up to 10 days, Starting Sun 11/4/2018, Until Wed 11/14/2018, Print           No discharge procedures on file      ED Provider  Electronically Signed by           REED Banuelos  01/10/19 9784

## 2019-01-07 NOTE — ED NOTES
D/c instructions and rx reviewed w/ pt  All questions and concerns addressed at this time         Catalina Woods, MINO  01/07/19 6280

## 2019-01-10 ENCOUNTER — TELEPHONE (OUTPATIENT)
Dept: OBGYN CLINIC | Facility: HOSPITAL | Age: 44
End: 2019-01-10

## 2019-01-10 ENCOUNTER — OFFICE VISIT (OUTPATIENT)
Dept: OBGYN CLINIC | Facility: CLINIC | Age: 44
End: 2019-01-10
Payer: COMMERCIAL

## 2019-01-10 VITALS
BODY MASS INDEX: 29.75 KG/M2 | WEIGHT: 231.8 LBS | HEIGHT: 74 IN | HEART RATE: 94 BPM | SYSTOLIC BLOOD PRESSURE: 131 MMHG | DIASTOLIC BLOOD PRESSURE: 94 MMHG

## 2019-01-10 DIAGNOSIS — G56.22 ULNAR NEURITIS, LEFT: Primary | ICD-10-CM

## 2019-01-10 PROCEDURE — 99203 OFFICE O/P NEW LOW 30 MIN: CPT | Performed by: ORTHOPAEDIC SURGERY

## 2019-01-10 RX ORDER — GABAPENTIN 300 MG/1
300 CAPSULE ORAL DAILY
Qty: 30 CAPSULE | Refills: 1 | Status: SHIPPED | OUTPATIENT
Start: 2019-01-10 | End: 2020-04-22

## 2019-01-10 NOTE — LETTER
January 10, 2019     Patient: Nitish Godinez   YOB: 1975   Date of Visit: 1/10/2019       To Whom it May Concern:    Nitish Godinez is under my professional care  He was seen in my office on 1/10/2019  He may return to work no use of left arm until cleared by physician  If you have any questions or concerns, please don't hesitate to call           Sincerely,          Jw Son MD        CC: No Recipients

## 2019-01-10 NOTE — PROGRESS NOTES
CHIEF COMPLAINT:  Chief Complaint   Patient presents with    Left Elbow - Pain       SUBJECTIVE:  Herbert Connor is a 37y o  year old LHD male who presents the office for evaluation of his left elbow  Patient states that last week while at work he was doing increased activity and felt severe pain in his left elbow  Patient states that has sharp pains in his forearm and palm as well  Patient denies numbness and tingling  Patient was seen in the emergency department on 01/07/2019 where he was prescribed prednisone which she is taking as prescribed  Patient has history of 2 left shoulder surgeries performed in 2016  Patient denies injury to his left elbow  PAST MEDICAL HISTORY:  History reviewed  No pertinent past medical history  PAST SURGICAL HISTORY:  Past Surgical History:   Procedure Laterality Date    BACK SURGERY      HERNIA REPAIR      KNEE ARTHROSCOPY      ROTATOR CUFF REPAIR      TONSILLECTOMY         FAMILY HISTORY:  History reviewed  No pertinent family history      SOCIAL HISTORY:  Social History   Substance Use Topics    Smoking status: Current Every Day Smoker     Packs/day: 0 50     Types: Cigarettes    Smokeless tobacco: Never Used    Alcohol use No       MEDICATIONS:    Current Outpatient Prescriptions:     methocarbamol (ROBAXIN) 750 mg tablet, Take 1 tablet (750 mg total) by mouth every 6 (six) hours as needed for muscle spasms for up to 5 days, Disp: 20 tablet, Rfl: 0    oxyCODONE (ROXICODONE) 10 MG TABS, Take 1 tablet (10 mg total) by mouth every 6 (six) hours as needed for moderate pain (cervical radiculitis/initial rx ) for up to 10 days Max Daily Amount: 40 mg, Disp: 12 tablet, Rfl: 0    predniSONE 5 mg tablet, 40 mg PO day 1, then 30 mg PO day 2, 20 mg PO day 3, 10 mg PO day 4, 5 mg PO day 5 then stop, Disp: 21 tablet, Rfl: 0    naproxen (NAPROSYN) 500 mg tablet, Take 1 tablet (500 mg total) by mouth 2 (two) times a day with meals for 5 days, Disp: 10 tablet, Rfl: 0    ALLERGIES:  No Known Allergies    REVIEW OF SYSTEMS:  Review of Systems   Constitutional: Negative for chills, fever and unexpected weight change  HENT: Negative for hearing loss, nosebleeds and sore throat  Eyes: Negative for pain, redness and visual disturbance  Respiratory: Negative for cough, shortness of breath and wheezing  Cardiovascular: Negative for chest pain, palpitations and leg swelling  Gastrointestinal: Negative for abdominal pain, nausea and vomiting  Endocrine: Negative for polydipsia and polyuria  Genitourinary: Negative for dysuria and hematuria  Musculoskeletal: Positive for arthralgias  Negative for joint swelling and myalgias  Skin: Negative for rash and wound  Neurological: Negative for light-headedness, numbness and headaches  Psychiatric/Behavioral: Positive for decreased concentration  Negative for dysphoric mood and suicidal ideas  The patient is not nervous/anxious          VITALS:  Vitals:    01/10/19 0928   BP: 131/94   Pulse: 94       LABS:  HgA1c: No results found for: HGBA1C  BMP: No results found for: GLUCOSE, CALCIUM, NA, K, CO2, CL, BUN, CREATININE    _____________________________________________________  PHYSICAL EXAMINATION:  General: well developed and well nourished, alert, oriented times 3 and appears comfortable  Psychiatric: Normal  HEENT: Trachea Midline, No torticollis  Pulmonary: No audible wheezing or respiratory distress   Skin: No masses, erthema, lacerations, fluctation, ulcerations  Neurovascular: Sensation Intact to the Median, Ulnar, Radial Nerve, Motor Intact to the Median, Ulnar, Radial Nerve and Pulses Intact    MUSCULOSKELETAL EXAMINATION:  Left elbow  No swelling erythema or ecchymosis  Positive Tinel's at the elbow  Tender over the medial aspect of the elbow      ___________________________________________________  STUDIES REVIEWED:  Images obtained on 1/7/19 of the Left elbow 3 views demonstrate No fracture or dislocation, osteophyte formation on the ulnar aspect of the elbow      PROCEDURES PERFORMED:  Procedures  No Procedures performed today    _____________________________________________________  ASSESSMENT/PLAN:    Left ulnar neuritis  * left upper extremity EMG was ordered  * left elbow MRI was ordered clicking for nerve abnormality  * patient was advised to take the B6  * patient was prescribed gabapentin 300 mg to take once a day at bedtime  * patient was given a note to return to work with no use of left arm  * patient will follow up in the office after EMG and MRI are performed        Follow Up:  Return after EMG MRI        To Do Next Visit:  Re-evaluation of current issue review EMG MRI        Scribe Attestation    I,:   Emiliana Tyler am acting as a scribe while in the presence of the attending physician :        I,:   Luis E Issa MD personally performed the services described in this documentation    as scribed in my presence :

## 2019-01-10 NOTE — TELEPHONE ENCOUNTER
Patient called and stated his Antonioton did not receive a script from his visit today with Dr Nilda Andino       Patient does not remeber what the script was called but it was suppose to be for his nerve in LT ELBOW    Patient would like to please be called when his script is complete at [phone]    Thank You

## 2019-01-14 NOTE — TELEPHONE ENCOUNTER
Patient  [phone]    Dr Lauryn Madrid    Patient called stating the people at MRI said that patient's insurance company didn't authorize MRI and told him to call to see what the next step was going to be

## 2019-01-15 ENCOUNTER — TELEPHONE (OUTPATIENT)
Dept: OBGYN CLINIC | Facility: CLINIC | Age: 44
End: 2019-01-15

## 2019-01-15 NOTE — TELEPHONE ENCOUNTER
I called the patient back  No answer  I left a message  The patient's chart says he already takes Naproxen bid  He can also take Tylenol 500mg 3 times a day  These medications can be purchased over the counter  I was not able to review any recent labs to look at liver function or kidney function  I do not see any past medical history in his chart that would limit him from taking NSAIDS or Tylenol (like blood thinners, previous heart attacks, kidney or liver issues, stomach ulcers, etc)  Please call the patient back to discuss  Thank you

## 2019-01-15 NOTE — TELEPHONE ENCOUNTER
I called sergio to make aware but he did not answer so I left a message  Patient has an appointment with Dr Chikis Stovall on 2/14/19 for EMG review

## 2019-01-15 NOTE — TELEPHONE ENCOUNTER
We saw this patient on 1/10 and ordered an EMG and MRI  The MRI was denied by his insurance  I did a peer to peer, but the EMG needs to be done first, before the insurance company will authorize an MRI  Please call the patient and let him know  Please schedule a f/u with Dr Sergio Bryant (unless he has one already) after the EMG to discuss results  We will re-evaluate the need for an MRI at that visit  I discussed this with Dr Sergio Bryant and he is aware  Thank you

## 2019-01-15 NOTE — TELEPHONE ENCOUNTER
Call to this patient at Mary's request there was no answer, left message for him to call back to [phone] to review her note

## 2019-01-16 NOTE — TELEPHONE ENCOUNTER
Call to this patient per him he is not taking the Naproxen   And he was told not to take the Ibuprofen, or any of that type of medications like that on his instructions, he would like for you or Dr Irving De Souza to give him a call today @ [phone]

## 2019-02-08 ENCOUNTER — PROCEDURE VISIT (OUTPATIENT)
Dept: NEUROLOGY | Facility: CLINIC | Age: 44
End: 2019-02-08
Payer: COMMERCIAL

## 2019-02-08 DIAGNOSIS — G56.22 ULNAR NEURITIS, LEFT: ICD-10-CM

## 2019-02-08 DIAGNOSIS — G56.22 ULNAR NEUROPATHY OF LEFT UPPER EXTREMITY: Primary | ICD-10-CM

## 2019-02-08 PROCEDURE — 95908 NRV CNDJ TST 3-4 STUDIES: CPT | Performed by: PSYCHIATRY & NEUROLOGY

## 2019-02-08 PROCEDURE — 95886 MUSC TEST DONE W/N TEST COMP: CPT | Performed by: PSYCHIATRY & NEUROLOGY

## 2019-02-08 NOTE — PROGRESS NOTES
EMG 1 Limb     Date/Time 2/8/2019 11:01 AM     Performed by  Maliha Rubio     Authorized by Lisa Cole            EMG LEFT UPPER EXTREMITY    Motor and sensory conduction studies were performed on the left median and ulnar nerves  The distal motor latencies were normal  The motor action potential amplitudes were normal  Motor conduction velocities were normal, except conduction of the ulnar nerve across the elbow which is prolonged at 29 m/sec  The left median and ulnar F waves were normal     Left median and ulnar sensory latencies were normal including median palmar stimulation with normal sensory action potential amplitudes  Concentric needle EMG was performed in various distal and proximal muscles of the left upper extremity including APB, FDI, EDC, brachial radialis, biceps, triceps in the low cervical paraspinal region  There was no evidence of spontaneous activity seen   The compound motor unit potentials were of normal configuration and interference patterns were full or full for effort    IMPRESSION: This is an abnormal EMG of the left upper extremity due to evidence of a localized neuropathic process involving the ulnar nerve at the elbow consistent with moderate to severe cubital tunnel syndrome with predominantly demyelinative change    ALINE Nelson

## 2019-02-14 ENCOUNTER — OFFICE VISIT (OUTPATIENT)
Dept: OBGYN CLINIC | Facility: CLINIC | Age: 44
End: 2019-02-14
Payer: COMMERCIAL

## 2019-02-14 VITALS
WEIGHT: 231 LBS | HEIGHT: 74 IN | BODY MASS INDEX: 29.65 KG/M2 | HEART RATE: 101 BPM | SYSTOLIC BLOOD PRESSURE: 143 MMHG | DIASTOLIC BLOOD PRESSURE: 87 MMHG

## 2019-02-14 DIAGNOSIS — G56.22 CUBITAL TUNNEL SYNDROME ON LEFT: Primary | ICD-10-CM

## 2019-02-14 PROCEDURE — 99214 OFFICE O/P EST MOD 30 MIN: CPT | Performed by: ORTHOPAEDIC SURGERY

## 2019-02-14 RX ORDER — HYDROCODONE BITARTRATE AND ACETAMINOPHEN 5; 325 MG/1; MG/1
1 TABLET ORAL EVERY 6 HOURS PRN
Qty: 20 TABLET | Refills: 0 | Status: SHIPPED | OUTPATIENT
Start: 2019-02-14 | End: 2019-02-24

## 2019-02-14 NOTE — PROGRESS NOTES
CHIEF COMPLAINT:  Chief Complaint   Patient presents with    Left Elbow - Follow-up       SUBJECTIVE:  Julio Shin is a 37y o  year old LHD male who presents to the office to review EMG of his left upper extremity which was ordered due to severe pain he has been having in his left forearm and palm  Pt states that he has continued pain id his elbow that shoots down his forearm into his palm  Pt states that he has some decreased sensation in his ring and small fingers  Pt again denies numbness and tingling  The patient denies any cardiac or pulmonary issues  Denies diabetes  Denies any history of MI, gastric ulcers, kidney or liver issues  Denies blood thinners  Pt was seen in the ED on 1/7/19 where he was prescribed prednisone  PAST MEDICAL HISTORY:  History reviewed  No pertinent past medical history  PAST SURGICAL HISTORY:  Past Surgical History:   Procedure Laterality Date    BACK SURGERY      HERNIA REPAIR      KNEE ARTHROSCOPY      ROTATOR CUFF REPAIR      TONSILLECTOMY         FAMILY HISTORY:  History reviewed  No pertinent family history  SOCIAL HISTORY:  Social History     Tobacco Use    Smoking status: Current Every Day Smoker     Packs/day: 0 50     Types: Cigarettes    Smokeless tobacco: Never Used   Substance Use Topics    Alcohol use: No    Drug use: No       MEDICATIONS:    Current Outpatient Medications:     gabapentin (NEURONTIN) 300 mg capsule, Take 1 capsule (300 mg total) by mouth daily At bed time, Disp: 30 capsule, Rfl: 1    HYDROcodone-acetaminophen (NORCO) 5-325 mg per tablet, Take 1 tablet by mouth every 6 (six) hours as needed for pain for up to 10 days To be taken after surgeryMax Daily Amount: 4 tablets, Disp: 20 tablet, Rfl: 0    ALLERGIES:  No Known Allergies    REVIEW OF SYSTEMS:  Review of Systems   Constitutional: Negative for chills, fever and unexpected weight change  HENT: Negative for hearing loss, nosebleeds and sore throat      Eyes: Negative for pain, redness and visual disturbance  Respiratory: Negative for cough, shortness of breath and wheezing  Cardiovascular: Negative for chest pain, palpitations and leg swelling  Gastrointestinal: Negative for abdominal pain, nausea and vomiting  Endocrine: Negative for polydipsia and polyuria  Genitourinary: Negative for dysuria and hematuria  Musculoskeletal: Negative for arthralgias, joint swelling and myalgias  Skin: Negative for rash and wound  Neurological: Negative for light-headedness, numbness and headaches  Psychiatric/Behavioral: Negative for decreased concentration, dysphoric mood and suicidal ideas  The patient is not nervous/anxious  VITALS:  Vitals:    02/14/19 0820   BP: 143/87   Pulse: 101       LABS:  HgA1c: No results found for: HGBA1C  BMP: No results found for: GLUCOSE, CALCIUM, NA, K, CO2, CL, BUN, CREATININE    _____________________________________________________  PHYSICAL EXAMINATION:  General: well developed and well nourished, alert, oriented times 3 and appears comfortable  Psychiatric: Normal  HEENT: Trachea Midline, No torticollis  Cardiac:  Regular rate and rhythm  Pulmonary: No respiratory distress  Lung sounds clear to auscultation  Skin: No masses, erthema, lacerations, fluctation, ulcerations  Neurovascular: Sensation Intact to the Median, Ulnar, Radial Nerve, Motor Intact to the Median, Ulnar, Radial Nerve and Pulses Intact    MUSCULOSKELETAL EXAMINATION:  Left Ulnar Nerve Exam:    Negative intrinsic atrophy  Negative deformity at the elbow  Full range of motion with flexion and extension of the elbow  No subluxation of the ulnar nerve  Positive ulnar nerve compression test at the elbow  Positive tinels over the ulnar nerve at the elbow  Negative cross finger test in the long and index fingers fingers  FDP strength is 5/5 to the ring finger  FDP strength is 5/5 to the small finger    Intrinsic strength 4+/5      2pt discrimination is abnormal throughout the ulnar nerve distribution - unable to discern 1 from 2 points      ___________________________________________________  STUDIES REVIEWED:  EMG LUE performed 2/8/19 shows evidence of localized neuropathic process involving the ulnar nerve at the elbow consistent with moderate to severe cubital tunnel syndrome with predominantly demyelinative changes  PROCEDURES PERFORMED:  Procedures  No Procedures performed today    _____________________________________________________  ASSESSMENT/PLAN:    Left cubital tunnel   * Surgery- Left ulna nerve release insitu   * Detailed consent was signed in the office   * anesthesia IV sedation with local   * post op pain medication will be sent to pharmacy on file   * PT/OT order was placed    Surgery medication instructions: You will stop eating and drinking at midnight the night before your surgery, but you may continue to take your normal medications with a small sip of water  In the morning on the day of your surgery, we would like you to take the following medications (as long as you have never been told to avoid Tylenol or NSAIDs like ibuprofen, Naproxen, Aleve, Advil, etc):   Ibuprofen 600mg one tablet by mouth   Tylenol 500mg one tablet by mouth    After surgery, we would like you to take Ibuprofen 600mg one tablet by mouth every 6 hours with food (at breakfast, lunch and dinner)  AND Tylenol 500 mg one tablet by mouth every 6 hours  (at breakfast, lunch and dinner) for 5-7 days after your surgery  Please take these medication EVERYDAY after surgery for 5-7 days, and not just as needed  You can take these medications at the same time  Taking these medications after surgery will limit your need for prescription pain medication  We will also prescribe a narcotic pain medication for a limited time after surgery that you can take as needed for moderate or severe pain            Diagnoses and all orders for this visit:    Cubital tunnel syndrome on left  -     Ambulatory referral to PT/OT hand therapy  -     Case request operating room: RELEASE CUBITAL TUNNEL left; Standing  -     Case request operating room: RELEASE CUBITAL TUNNEL left  -     HYDROcodone-acetaminophen (NORCO) 5-325 mg per tablet; Take 1 tablet by mouth every 6 (six) hours as needed for pain for up to 10 days To be taken after surgeryMax Daily Amount: 4 tablets    Other orders  -     Diet NPO; Sips with meds; Standing  -     Height and weight upon arrival; Standing  -     Void on call to OR; Standing  -     Insert peripheral IV; Standing        Follow Up:  Return for after surgery  To Do Next Visit:  Re-evaluation of current issue        Operative Discussions:  Cubital Tunnel Release: The anatomy and physiology of cubital tunnel syndrome were discussed with the patient today in the office  Typically, increased elbow flexion activities decrease blood flow within the intraneural spaces, resulting in a feeling of numbness, tingling, weakness, or clumsiness within the hand and fingers  Occasionally, anatomic structures such as medial elbow osteophytes, the medial head of the triceps, were subluxing ulnar nerve may result in increased pressure or aggravation at the cubital tunnel  Typical signs and symptoms usually include numbness and tingling within the ring and small finger, weakness with , and weakness with pinch  Conservative treatment and includes nocturnal bracing to keep the elbow in a semi-extended position, activity modification, therapy, and avoiding excessive elbow flexion activities  A majority of patients typically respond to conservative treatment over a period of approximately 3-6 months  Vitamin B6 one tablet daily over the counter may helpful to reduce symptoms  EMG/NCV testing of the ulnar nerve at the elbow is not as reliable as carpal tunnel syndrome    Surgical intervention in the form of in situ release of the ulnar nerve at the elbow or ulnar nerve transposition may be required in up to 20% of patients  The patient has elected to undergo cubital tunnel release  The possibility of converting to a subcutaneous or submuscular ulnar nerve transposition depending on the nerve stability was discussed with the patient  Typically, in the postoperative period, light activities are allowed immediately, driving is allowed when narcotic medications have stopped, and the incision may get wet after 5 days  Heavy activities will be allowed after follow up appointment in 1-2 weeks  While the pain within the ring and small finger of the hands generally improves rapidly, the numbness and tingling, as well as the strength, will slowly improve over a period of weeks to months  Total recovery can take up to 18 months from the time of surgery  Numbness and tingling near the incision, or near the medial aspect of the forearm was discussed with the patient  The patient has an understanding of the above mentioned discussion  The risks and benefits of the procedure were explained to the patient, which include, but are not limited to: Bleeding, infection, recurrence, pain, scar, damage to tendons, damage to nerves, and damage to blood vessels, failure to give desired results and complications related to anesthesia  These risks, along with alternative conservative treatment options, and postoperative protocols were voiced back and understood by the patient  All questions were answered to the patient's satisfaction  The patient agrees to comply with a standard postoperative protocol, and is willing to proceed  Education was provided via written and auditory forms  There were no barriers to learning  Written handouts regarding wound care, incision and scar care, and general preoperative information was provided to the patient  Prior to surgery, the patient may be requested to stop all anti-inflammatory medications    Prophylactic aspirin, Plavix, and Coumadin may be allowed to be continued  Medications including vitamin E , ginkgo, and fish oil are requested to be stopped approximately one week prior to surgery  Hypertensive medications and beta blockers, if taken, should be continued  Vitamin B6 one tablet daily over the counter may helpful to reduce symptoms        Scribe Attestation    I,:   Emiliana Tyler am acting as a scribe while in the presence of the attending physician :        I,:   Luis E Issa MD personally performed the services described in this documentation    as scribed in my presence :

## 2019-02-14 NOTE — H&P (VIEW-ONLY)
CHIEF COMPLAINT:  Chief Complaint   Patient presents with    Left Elbow - Follow-up       SUBJECTIVE:  Carolyn Hays is a 37y o  year old LHD male who presents to the office to review EMG of his left upper extremity which was ordered due to severe pain he has been having in his left forearm and palm  Pt states that he has continued pain id his elbow that shoots down his forearm into his palm  Pt states that he has some decreased sensation in his ring and small fingers  Pt again denies numbness and tingling  The patient denies any cardiac or pulmonary issues  Denies diabetes  Denies any history of MI, gastric ulcers, kidney or liver issues  Denies blood thinners  Pt was seen in the ED on 1/7/19 where he was prescribed prednisone  PAST MEDICAL HISTORY:  History reviewed  No pertinent past medical history  PAST SURGICAL HISTORY:  Past Surgical History:   Procedure Laterality Date    BACK SURGERY      HERNIA REPAIR      KNEE ARTHROSCOPY      ROTATOR CUFF REPAIR      TONSILLECTOMY         FAMILY HISTORY:  History reviewed  No pertinent family history  SOCIAL HISTORY:  Social History     Tobacco Use    Smoking status: Current Every Day Smoker     Packs/day: 0 50     Types: Cigarettes    Smokeless tobacco: Never Used   Substance Use Topics    Alcohol use: No    Drug use: No       MEDICATIONS:    Current Outpatient Medications:     gabapentin (NEURONTIN) 300 mg capsule, Take 1 capsule (300 mg total) by mouth daily At bed time, Disp: 30 capsule, Rfl: 1    HYDROcodone-acetaminophen (NORCO) 5-325 mg per tablet, Take 1 tablet by mouth every 6 (six) hours as needed for pain for up to 10 days To be taken after surgeryMax Daily Amount: 4 tablets, Disp: 20 tablet, Rfl: 0    ALLERGIES:  No Known Allergies    REVIEW OF SYSTEMS:  Review of Systems   Constitutional: Negative for chills, fever and unexpected weight change  HENT: Negative for hearing loss, nosebleeds and sore throat      Eyes: Negative for pain, redness and visual disturbance  Respiratory: Negative for cough, shortness of breath and wheezing  Cardiovascular: Negative for chest pain, palpitations and leg swelling  Gastrointestinal: Negative for abdominal pain, nausea and vomiting  Endocrine: Negative for polydipsia and polyuria  Genitourinary: Negative for dysuria and hematuria  Musculoskeletal: Negative for arthralgias, joint swelling and myalgias  Skin: Negative for rash and wound  Neurological: Negative for light-headedness, numbness and headaches  Psychiatric/Behavioral: Negative for decreased concentration, dysphoric mood and suicidal ideas  The patient is not nervous/anxious  VITALS:  Vitals:    02/14/19 0820   BP: 143/87   Pulse: 101       LABS:  HgA1c: No results found for: HGBA1C  BMP: No results found for: GLUCOSE, CALCIUM, NA, K, CO2, CL, BUN, CREATININE    _____________________________________________________  PHYSICAL EXAMINATION:  General: well developed and well nourished, alert, oriented times 3 and appears comfortable  Psychiatric: Normal  HEENT: Trachea Midline, No torticollis  Cardiac:  Regular rate and rhythm  Pulmonary: No respiratory distress  Lung sounds clear to auscultation  Skin: No masses, erthema, lacerations, fluctation, ulcerations  Neurovascular: Sensation Intact to the Median, Ulnar, Radial Nerve, Motor Intact to the Median, Ulnar, Radial Nerve and Pulses Intact    MUSCULOSKELETAL EXAMINATION:  Left Ulnar Nerve Exam:    Negative intrinsic atrophy  Negative deformity at the elbow  Full range of motion with flexion and extension of the elbow  No subluxation of the ulnar nerve  Positive ulnar nerve compression test at the elbow  Positive tinels over the ulnar nerve at the elbow  Negative cross finger test in the long and index fingers fingers  FDP strength is 5/5 to the ring finger  FDP strength is 5/5 to the small finger    Intrinsic strength 4+/5      2pt discrimination is abnormal throughout the ulnar nerve distribution - unable to discern 1 from 2 points      ___________________________________________________  STUDIES REVIEWED:  EMG LUE performed 2/8/19 shows evidence of localized neuropathic process involving the ulnar nerve at the elbow consistent with moderate to severe cubital tunnel syndrome with predominantly demyelinative changes  PROCEDURES PERFORMED:  Procedures  No Procedures performed today    _____________________________________________________  ASSESSMENT/PLAN:    Left cubital tunnel   * Surgery- Left ulna nerve release insitu   * Detailed consent was signed in the office   * anesthesia IV sedation with local   * post op pain medication will be sent to pharmacy on file   * PT/OT order was placed    Surgery medication instructions: You will stop eating and drinking at midnight the night before your surgery, but you may continue to take your normal medications with a small sip of water  In the morning on the day of your surgery, we would like you to take the following medications (as long as you have never been told to avoid Tylenol or NSAIDs like ibuprofen, Naproxen, Aleve, Advil, etc):   Ibuprofen 600mg one tablet by mouth   Tylenol 500mg one tablet by mouth    After surgery, we would like you to take Ibuprofen 600mg one tablet by mouth every 6 hours with food (at breakfast, lunch and dinner)  AND Tylenol 500 mg one tablet by mouth every 6 hours  (at breakfast, lunch and dinner) for 5-7 days after your surgery  Please take these medication EVERYDAY after surgery for 5-7 days, and not just as needed  You can take these medications at the same time  Taking these medications after surgery will limit your need for prescription pain medication  We will also prescribe a narcotic pain medication for a limited time after surgery that you can take as needed for moderate or severe pain            Diagnoses and all orders for this visit:    Cubital tunnel syndrome on left  -     Ambulatory referral to PT/OT hand therapy  -     Case request operating room: RELEASE CUBITAL TUNNEL left; Standing  -     Case request operating room: RELEASE CUBITAL TUNNEL left  -     HYDROcodone-acetaminophen (NORCO) 5-325 mg per tablet; Take 1 tablet by mouth every 6 (six) hours as needed for pain for up to 10 days To be taken after surgeryMax Daily Amount: 4 tablets    Other orders  -     Diet NPO; Sips with meds; Standing  -     Height and weight upon arrival; Standing  -     Void on call to OR; Standing  -     Insert peripheral IV; Standing        Follow Up:  Return for after surgery  To Do Next Visit:  Re-evaluation of current issue        Operative Discussions:  Cubital Tunnel Release: The anatomy and physiology of cubital tunnel syndrome were discussed with the patient today in the office  Typically, increased elbow flexion activities decrease blood flow within the intraneural spaces, resulting in a feeling of numbness, tingling, weakness, or clumsiness within the hand and fingers  Occasionally, anatomic structures such as medial elbow osteophytes, the medial head of the triceps, were subluxing ulnar nerve may result in increased pressure or aggravation at the cubital tunnel  Typical signs and symptoms usually include numbness and tingling within the ring and small finger, weakness with , and weakness with pinch  Conservative treatment and includes nocturnal bracing to keep the elbow in a semi-extended position, activity modification, therapy, and avoiding excessive elbow flexion activities  A majority of patients typically respond to conservative treatment over a period of approximately 3-6 months  Vitamin B6 one tablet daily over the counter may helpful to reduce symptoms  EMG/NCV testing of the ulnar nerve at the elbow is not as reliable as carpal tunnel syndrome    Surgical intervention in the form of in situ release of the ulnar nerve at the elbow or ulnar nerve transposition may be required in up to 20% of patients  The patient has elected to undergo cubital tunnel release  The possibility of converting to a subcutaneous or submuscular ulnar nerve transposition depending on the nerve stability was discussed with the patient  Typically, in the postoperative period, light activities are allowed immediately, driving is allowed when narcotic medications have stopped, and the incision may get wet after 5 days  Heavy activities will be allowed after follow up appointment in 1-2 weeks  While the pain within the ring and small finger of the hands generally improves rapidly, the numbness and tingling, as well as the strength, will slowly improve over a period of weeks to months  Total recovery can take up to 18 months from the time of surgery  Numbness and tingling near the incision, or near the medial aspect of the forearm was discussed with the patient  The patient has an understanding of the above mentioned discussion  The risks and benefits of the procedure were explained to the patient, which include, but are not limited to: Bleeding, infection, recurrence, pain, scar, damage to tendons, damage to nerves, and damage to blood vessels, failure to give desired results and complications related to anesthesia  These risks, along with alternative conservative treatment options, and postoperative protocols were voiced back and understood by the patient  All questions were answered to the patient's satisfaction  The patient agrees to comply with a standard postoperative protocol, and is willing to proceed  Education was provided via written and auditory forms  There were no barriers to learning  Written handouts regarding wound care, incision and scar care, and general preoperative information was provided to the patient  Prior to surgery, the patient may be requested to stop all anti-inflammatory medications    Prophylactic aspirin, Plavix, and Coumadin may be allowed to be continued  Medications including vitamin E , ginkgo, and fish oil are requested to be stopped approximately one week prior to surgery  Hypertensive medications and beta blockers, if taken, should be continued  Vitamin B6 one tablet daily over the counter may helpful to reduce symptoms        Scribe Attestation    I,:   Yisel Ha am acting as a scribe while in the presence of the attending physician :        I,:   Maddie Bustos MD personally performed the services described in this documentation    as scribed in my presence :

## 2019-02-14 NOTE — H&P
CHIEF COMPLAINT:  Chief Complaint   Patient presents with    Left Elbow - Follow-up       SUBJECTIVE:  Carolyn Hays is a 37y o  year old LHD male who presents to the office to review EMG of his left upper extremity which was ordered due to severe pain he has been having in his left forearm and palm  Pt states that he has continued pain id his elbow that shoots down his forearm into his palm  Pt states that he has some decreased sensation in his ring and small fingers  Pt again denies numbness and tingling  The patient denies any cardiac or pulmonary issues  Denies diabetes  Denies any history of MI, gastric ulcers, kidney or liver issues  Denies blood thinners  Pt was seen in the ED on 1/7/19 where he was prescribed prednisone  PAST MEDICAL HISTORY:  History reviewed  No pertinent past medical history  PAST SURGICAL HISTORY:  Past Surgical History:   Procedure Laterality Date    BACK SURGERY      HERNIA REPAIR      KNEE ARTHROSCOPY      ROTATOR CUFF REPAIR      TONSILLECTOMY         FAMILY HISTORY:  History reviewed  No pertinent family history  SOCIAL HISTORY:  Social History     Tobacco Use    Smoking status: Current Every Day Smoker     Packs/day: 0 50     Types: Cigarettes    Smokeless tobacco: Never Used   Substance Use Topics    Alcohol use: No    Drug use: No       MEDICATIONS:    Current Outpatient Medications:     gabapentin (NEURONTIN) 300 mg capsule, Take 1 capsule (300 mg total) by mouth daily At bed time, Disp: 30 capsule, Rfl: 1    HYDROcodone-acetaminophen (NORCO) 5-325 mg per tablet, Take 1 tablet by mouth every 6 (six) hours as needed for pain for up to 10 days To be taken after surgeryMax Daily Amount: 4 tablets, Disp: 20 tablet, Rfl: 0    ALLERGIES:  No Known Allergies    REVIEW OF SYSTEMS:  Review of Systems   Constitutional: Negative for chills, fever and unexpected weight change  HENT: Negative for hearing loss, nosebleeds and sore throat      Eyes: Negative for pain, redness and visual disturbance  Respiratory: Negative for cough, shortness of breath and wheezing  Cardiovascular: Negative for chest pain, palpitations and leg swelling  Gastrointestinal: Negative for abdominal pain, nausea and vomiting  Endocrine: Negative for polydipsia and polyuria  Genitourinary: Negative for dysuria and hematuria  Musculoskeletal: Negative for arthralgias, joint swelling and myalgias  Skin: Negative for rash and wound  Neurological: Negative for light-headedness, numbness and headaches  Psychiatric/Behavioral: Negative for decreased concentration, dysphoric mood and suicidal ideas  The patient is not nervous/anxious  VITALS:  Vitals:    02/14/19 0820   BP: 143/87   Pulse: 101       LABS:  HgA1c: No results found for: HGBA1C  BMP: No results found for: GLUCOSE, CALCIUM, NA, K, CO2, CL, BUN, CREATININE    _____________________________________________________  PHYSICAL EXAMINATION:  General: well developed and well nourished, alert, oriented times 3 and appears comfortable  Psychiatric: Normal  HEENT: Trachea Midline, No torticollis  Cardiac:  Regular rate and rhythm  Pulmonary: No respiratory distress  Lung sounds clear to auscultation  Skin: No masses, erthema, lacerations, fluctation, ulcerations  Neurovascular: Sensation Intact to the Median, Ulnar, Radial Nerve, Motor Intact to the Median, Ulnar, Radial Nerve and Pulses Intact    MUSCULOSKELETAL EXAMINATION:  Left Ulnar Nerve Exam:    Negative intrinsic atrophy  Negative deformity at the elbow  Full range of motion with flexion and extension of the elbow  No subluxation of the ulnar nerve  Positive ulnar nerve compression test at the elbow  Positive tinels over the ulnar nerve at the elbow  Negative cross finger test in the long and index fingers fingers  FDP strength is 5/5 to the ring finger  FDP strength is 5/5 to the small finger    Intrinsic strength 4+/5      2pt discrimination is abnormal throughout the ulnar nerve distribution - unable to discern 1 from 2 points      ___________________________________________________  STUDIES REVIEWED:  EMG LUE performed 2/8/19 shows evidence of localized neuropathic process involving the ulnar nerve at the elbow consistent with moderate to severe cubital tunnel syndrome with predominantly demyelinative changes  PROCEDURES PERFORMED:  Procedures  No Procedures performed today    _____________________________________________________  ASSESSMENT/PLAN:    Left cubital tunnel   * Surgery- Left ulna nerve release insitu   * Detailed consent was signed in the office   * anesthesia IV sedation with local   * post op pain medication will be sent to pharmacy on file   * PT/OT order was placed    Surgery medication instructions: You will stop eating and drinking at midnight the night before your surgery, but you may continue to take your normal medications with a small sip of water  In the morning on the day of your surgery, we would like you to take the following medications (as long as you have never been told to avoid Tylenol or NSAIDs like ibuprofen, Naproxen, Aleve, Advil, etc):   Ibuprofen 600mg one tablet by mouth   Tylenol 500mg one tablet by mouth    After surgery, we would like you to take Ibuprofen 600mg one tablet by mouth every 6 hours with food (at breakfast, lunch and dinner)  AND Tylenol 500 mg one tablet by mouth every 6 hours  (at breakfast, lunch and dinner) for 5-7 days after your surgery  Please take these medication EVERYDAY after surgery for 5-7 days, and not just as needed  You can take these medications at the same time  Taking these medications after surgery will limit your need for prescription pain medication  We will also prescribe a narcotic pain medication for a limited time after surgery that you can take as needed for moderate or severe pain            Diagnoses and all orders for this visit:    Cubital tunnel syndrome on left  -     Ambulatory referral to PT/OT hand therapy  -     Case request operating room: RELEASE CUBITAL TUNNEL left; Standing  -     Case request operating room: RELEASE CUBITAL TUNNEL left  -     HYDROcodone-acetaminophen (NORCO) 5-325 mg per tablet; Take 1 tablet by mouth every 6 (six) hours as needed for pain for up to 10 days To be taken after surgeryMax Daily Amount: 4 tablets    Other orders  -     Diet NPO; Sips with meds; Standing  -     Height and weight upon arrival; Standing  -     Void on call to OR; Standing  -     Insert peripheral IV; Standing        Follow Up:  Return for after surgery  To Do Next Visit:  Re-evaluation of current issue        Operative Discussions:  Cubital Tunnel Release: The anatomy and physiology of cubital tunnel syndrome were discussed with the patient today in the office  Typically, increased elbow flexion activities decrease blood flow within the intraneural spaces, resulting in a feeling of numbness, tingling, weakness, or clumsiness within the hand and fingers  Occasionally, anatomic structures such as medial elbow osteophytes, the medial head of the triceps, were subluxing ulnar nerve may result in increased pressure or aggravation at the cubital tunnel  Typical signs and symptoms usually include numbness and tingling within the ring and small finger, weakness with , and weakness with pinch  Conservative treatment and includes nocturnal bracing to keep the elbow in a semi-extended position, activity modification, therapy, and avoiding excessive elbow flexion activities  A majority of patients typically respond to conservative treatment over a period of approximately 3-6 months  Vitamin B6 one tablet daily over the counter may helpful to reduce symptoms  EMG/NCV testing of the ulnar nerve at the elbow is not as reliable as carpal tunnel syndrome    Surgical intervention in the form of in situ release of the ulnar nerve at the elbow or ulnar nerve transposition may be required in up to 20% of patients  The patient has elected to undergo cubital tunnel release  The possibility of converting to a subcutaneous or submuscular ulnar nerve transposition depending on the nerve stability was discussed with the patient  Typically, in the postoperative period, light activities are allowed immediately, driving is allowed when narcotic medications have stopped, and the incision may get wet after 5 days  Heavy activities will be allowed after follow up appointment in 1-2 weeks  While the pain within the ring and small finger of the hands generally improves rapidly, the numbness and tingling, as well as the strength, will slowly improve over a period of weeks to months  Total recovery can take up to 18 months from the time of surgery  Numbness and tingling near the incision, or near the medial aspect of the forearm was discussed with the patient  The patient has an understanding of the above mentioned discussion  The risks and benefits of the procedure were explained to the patient, which include, but are not limited to: Bleeding, infection, recurrence, pain, scar, damage to tendons, damage to nerves, and damage to blood vessels, failure to give desired results and complications related to anesthesia  These risks, along with alternative conservative treatment options, and postoperative protocols were voiced back and understood by the patient  All questions were answered to the patient's satisfaction  The patient agrees to comply with a standard postoperative protocol, and is willing to proceed  Education was provided via written and auditory forms  There were no barriers to learning  Written handouts regarding wound care, incision and scar care, and general preoperative information was provided to the patient  Prior to surgery, the patient may be requested to stop all anti-inflammatory medications    Prophylactic aspirin, Plavix, and Coumadin may be allowed to be continued  Medications including vitamin E , ginkgo, and fish oil are requested to be stopped approximately one week prior to surgery  Hypertensive medications and beta blockers, if taken, should be continued  Vitamin B6 one tablet daily over the counter may helpful to reduce symptoms        Scribe Attestation    I,:   Sudhakar Sood am acting as a scribe while in the presence of the attending physician :        I,:   Jaquan Ruth MD personally performed the services described in this documentation    as scribed in my presence :

## 2019-02-27 ENCOUNTER — ANESTHESIA (OUTPATIENT)
Dept: PERIOP | Facility: HOSPITAL | Age: 44
End: 2019-02-27
Payer: COMMERCIAL

## 2019-02-27 ENCOUNTER — ANESTHESIA EVENT (OUTPATIENT)
Dept: PERIOP | Facility: HOSPITAL | Age: 44
End: 2019-02-27
Payer: COMMERCIAL

## 2019-02-27 ENCOUNTER — HOSPITAL ENCOUNTER (OUTPATIENT)
Facility: HOSPITAL | Age: 44
Setting detail: OUTPATIENT SURGERY
Discharge: HOME | End: 2019-02-27
Attending: ORTHOPAEDIC SURGERY | Admitting: ORTHOPAEDIC SURGERY
Payer: COMMERCIAL

## 2019-02-27 VITALS
BODY MASS INDEX: 29.88 KG/M2 | TEMPERATURE: 97.4 F | WEIGHT: 232.81 LBS | DIASTOLIC BLOOD PRESSURE: 74 MMHG | SYSTOLIC BLOOD PRESSURE: 157 MMHG | HEART RATE: 90 BPM | HEIGHT: 74 IN | OXYGEN SATURATION: 96 % | RESPIRATION RATE: 20 BRPM

## 2019-02-27 PROCEDURE — 64718 REVISE ULNAR NERVE AT ELBOW: CPT | Performed by: PHYSICIAN ASSISTANT

## 2019-02-27 PROCEDURE — 64718 REVISE ULNAR NERVE AT ELBOW: CPT | Performed by: ORTHOPAEDIC SURGERY

## 2019-02-27 RX ORDER — SODIUM CHLORIDE, SODIUM LACTATE, POTASSIUM CHLORIDE, CALCIUM CHLORIDE 600; 310; 30; 20 MG/100ML; MG/100ML; MG/100ML; MG/100ML
100 INJECTION, SOLUTION INTRAVENOUS CONTINUOUS
Status: DISCONTINUED | OUTPATIENT
Start: 2019-02-27 | End: 2019-02-27 | Stop reason: HOSPADM

## 2019-02-27 RX ORDER — MAGNESIUM HYDROXIDE 1200 MG/15ML
LIQUID ORAL AS NEEDED
Status: DISCONTINUED | OUTPATIENT
Start: 2019-02-27 | End: 2019-02-27 | Stop reason: HOSPADM

## 2019-02-27 RX ORDER — DEXMEDETOMIDINE HYDROCHLORIDE 100 UG/ML
INJECTION, SOLUTION INTRAVENOUS AS NEEDED
Status: DISCONTINUED | OUTPATIENT
Start: 2019-02-27 | End: 2019-02-27 | Stop reason: SURG

## 2019-02-27 RX ORDER — PROPOFOL 10 MG/ML
INJECTION, EMULSION INTRAVENOUS CONTINUOUS PRN
Status: DISCONTINUED | OUTPATIENT
Start: 2019-02-27 | End: 2019-02-27

## 2019-02-27 RX ORDER — PROPOFOL 10 MG/ML
INJECTION, EMULSION INTRAVENOUS AS NEEDED
Status: DISCONTINUED | OUTPATIENT
Start: 2019-02-27 | End: 2019-02-27 | Stop reason: SURG

## 2019-02-27 RX ORDER — FENTANYL CITRATE 50 UG/ML
INJECTION, SOLUTION INTRAMUSCULAR; INTRAVENOUS AS NEEDED
Status: DISCONTINUED | OUTPATIENT
Start: 2019-02-27 | End: 2019-02-27 | Stop reason: SURG

## 2019-02-27 RX ORDER — METOCLOPRAMIDE HYDROCHLORIDE 5 MG/ML
10 INJECTION INTRAMUSCULAR; INTRAVENOUS ONCE AS NEEDED
Status: DISCONTINUED | OUTPATIENT
Start: 2019-02-27 | End: 2019-02-27 | Stop reason: HOSPADM

## 2019-02-27 RX ORDER — ALBUTEROL SULFATE 2.5 MG/3ML
2.5 SOLUTION RESPIRATORY (INHALATION) ONCE AS NEEDED
Status: DISCONTINUED | OUTPATIENT
Start: 2019-02-27 | End: 2019-02-27 | Stop reason: HOSPADM

## 2019-02-27 RX ORDER — SODIUM CHLORIDE, SODIUM LACTATE, POTASSIUM CHLORIDE, CALCIUM CHLORIDE 600; 310; 30; 20 MG/100ML; MG/100ML; MG/100ML; MG/100ML
INJECTION, SOLUTION INTRAVENOUS CONTINUOUS PRN
Status: DISCONTINUED | OUTPATIENT
Start: 2019-02-27 | End: 2019-02-27

## 2019-02-27 RX ORDER — ONDANSETRON 2 MG/ML
4 INJECTION INTRAMUSCULAR; INTRAVENOUS ONCE AS NEEDED
Status: DISCONTINUED | OUTPATIENT
Start: 2019-02-27 | End: 2019-02-27 | Stop reason: HOSPADM

## 2019-02-27 RX ORDER — MIDAZOLAM HYDROCHLORIDE 1 MG/ML
INJECTION INTRAMUSCULAR; INTRAVENOUS AS NEEDED
Status: DISCONTINUED | OUTPATIENT
Start: 2019-02-27 | End: 2019-02-27 | Stop reason: SURG

## 2019-02-27 RX ADMIN — PROPOFOL 100 MCG/KG/MIN: 10 INJECTION, EMULSION INTRAVENOUS at 11:50

## 2019-02-27 RX ADMIN — FENTANYL CITRATE 50 MCG: 50 INJECTION, SOLUTION INTRAMUSCULAR; INTRAVENOUS at 11:50

## 2019-02-27 RX ADMIN — DEXMEDETOMIDINE HCL 8 MCG: 100 INJECTION INTRAVENOUS at 11:54

## 2019-02-27 RX ADMIN — FENTANYL CITRATE 50 MCG: 50 INJECTION, SOLUTION INTRAMUSCULAR; INTRAVENOUS at 11:53

## 2019-02-27 RX ADMIN — PROPOFOL 50 MG: 10 INJECTION, EMULSION INTRAVENOUS at 11:50

## 2019-02-27 RX ADMIN — SODIUM CHLORIDE, SODIUM LACTATE, POTASSIUM CHLORIDE, AND CALCIUM CHLORIDE: .6; .31; .03; .02 INJECTION, SOLUTION INTRAVENOUS at 10:10

## 2019-02-27 RX ADMIN — MIDAZOLAM HYDROCHLORIDE 2 MG: 1 INJECTION, SOLUTION INTRAMUSCULAR; INTRAVENOUS at 11:48

## 2019-02-27 RX ADMIN — PROPOFOL 50 MG: 10 INJECTION, EMULSION INTRAVENOUS at 11:53

## 2019-02-27 NOTE — ANESTHESIA POSTPROCEDURE EVALUATION
Post-Op Assessment Note    Pain Score: 0    Pain management: adequate     Mental Status:  Alert and awake   Hydration Status:  Stable   PONV Controlled:  None   Airway Patency:  Patent and adequate   Post Op Vitals Reviewed: Yes      Staff: Anesthesiologist, CRNA           BP   142/69   Temp   97 7   Pulse  102   Resp   18   SpO2   97%

## 2019-02-27 NOTE — OP NOTE
OPERATIVE REPORT    PATIENT NAME: Carolyn Hays     MEDICAL RECORD NO:  [id number]    PROCEDURE DATE:  19    :  1975    SURGEON:  ALINE Cortés , Ph D     Remi Certain:  Christiane Cruz DIAGNOSIS:  left cubital tunnel syndrome     POSTOPERATIVE DIAGNOSIS: left cubital tunnel syndrome     PROCEDURE PERFORMED: left ulnar nerve in situ release    ANESTHESIA:  conscious sedation and local     COMPLICATIONS:  none     TOURNIQUET TIME:  10 minutes at 250 mmHg     DISPOSITION:  The patient was awakened in the operating room and sent to the PACU in stable condition  INDICATIONS:  The patient is a 37 y o  male with left cubital tunnel syndrome was verified electrodiagnostically  Clinical testing demonstrated the nerve to be irritated at the level of the cubital tunnel  The patient failed conservative management and surgical release was offered  The risks and benefits of the surgery were explained to the patient who understood and wished to proceed  PROCEDURE:  The patient was identified in the preoperative screening area  Consent was signed and verified after identifying the correct operative site  The patient was taken back to the operating room after an axillary block was placed in the preop holding area  General anesthesia was induced  The patients left upper extremity was prepped and draped in the normal sterile fashion with Chlorhexidine solution  The arm was then elevated, exsanguinated with an Esmarch and a tourniquet placed about the brachium was insufflated to 250 mmHg  The Esmarch was removed  A 2 cm  longitudinal incision was made on the medial aspect of the elbow centered over the ulnar groove  The incision was made sharply with a #15 blade knife  Subcutaneous dissection was performed bluntly  Vessels were cauterized and ligated where necessary  Care was taken to identify and protect the medial antebrachial cutaneous nerve    The intermuscular septum was identified proximal to the medial epicondyle  The ulnar nerve was identified just posterior to the medial intermuscular septum and the fascia overlying the ulnar nerve was released 8 cm  proximal to the medial epicondyle  7) Lying superficial to the ulnar groove, the anconeus epitrochlearis was identified and released (will be dictated as: to be included/excluded)  Distal release of the ulnar nerve was then completed by releasing Nascimentos ligament, the superficial and deep fascia of the FCU muscle, and the FCU muscle fibers which were bluntly split  The ulnar nerve was moderately compressed at the level of the FCU fascia  Once the nerve was completely released both distally and proximally, the elbow was brought through a full range of motion to test the nerve for subluxation  The nerve was stable and did not subluxate with elbow flexion and, therefore, transposition of the nerve was not required  The wound was then irrigated  The tourniquet was released  Hemostasis was achieved  The wound was then closed  The subcutaneous tissue was closed with #4-0 Vicryl suture in an interrupted simple fashion  The skin was closed with nylon sutures in an interrupted horizontal mattress fashion  The patient tolerated the procedure well  There were no complications during the case  The patient was awakened in the operating room and transferred to the recovery room in stable condition  The wound was dressed in a soft bulky dressing and the left upper extremity was placed in a sling  A physician's assistant was required during this procedure in lack of availability of an orthopedic resident  The physician's assistant was required for positioning of the upper extremity and retraction          Power Hawkins MD 02/27/19 12:18 PM

## 2019-02-27 NOTE — ANESTHESIA PREPROCEDURE EVALUATION
Review of Systems/Medical History  Patient summary reviewed  Chart reviewed  No history of anesthetic complications     Cardiovascular  Negative cardio ROS Exercise tolerance (METS): >4,  Dysrhythmias (incomplete bundle branch block) ,    Pulmonary  Smoker , Tobacco cessation counseling given , No recent URI ,        GI/Hepatic      Comment: Confirmed NPO appropriate     Negative  ROS        Endo/Other  Negative endo/other ROS      GYN       Hematology  Negative hematology ROS      Musculoskeletal  Negative musculoskeletal ROS        Neurology  Negative neurology ROS      Psychology   Negative psychology ROS              Physical Exam    Airway    Mallampati score: II  TM Distance: >3 FB  Neck ROM: full     Dental   No notable dental hx     Cardiovascular  Comment: Negative ROS, Rhythm: regular, Rate: normal, Cardiovascular exam normal    Pulmonary  Pulmonary exam normal Breath sounds clear to auscultation,     Other Findings        Anesthesia Plan  ASA Score- 2     Anesthesia Type- IV sedation with anesthesia with ASA Monitors  Additional Monitors:   Airway Plan:     Comment: I discussed the risks and benefits of IV sedation anesthesia including the possibility of the need to convert to general anesthesia and the potential risk of awareness  The patient was given the opportunity to ask questions, which were answered        Plan Factors-    Induction- intravenous  Postoperative Plan-     Informed Consent- Anesthetic plan and risks discussed with patient

## 2019-03-01 ENCOUNTER — EVALUATION (OUTPATIENT)
Dept: OCCUPATIONAL THERAPY | Facility: CLINIC | Age: 44
End: 2019-03-01
Payer: COMMERCIAL

## 2019-03-01 DIAGNOSIS — G56.22 CUBITAL TUNNEL SYNDROME ON LEFT: Primary | ICD-10-CM

## 2019-03-01 PROCEDURE — 97110 THERAPEUTIC EXERCISES: CPT

## 2019-03-01 PROCEDURE — 97165 OT EVAL LOW COMPLEX 30 MIN: CPT

## 2019-03-01 RX ORDER — IBUPROFEN 600 MG/1
600 TABLET ORAL EVERY 6 HOURS PRN
Status: ON HOLD | COMMUNITY
End: 2020-05-15 | Stop reason: ALTCHOICE

## 2019-03-01 RX ORDER — ACETAMINOPHEN 500 MG
500 TABLET ORAL EVERY 6 HOURS PRN
COMMUNITY
End: 2020-04-22

## 2019-03-01 NOTE — PROGRESS NOTES
OT Evaluation     Today's date: 3/1/2019  Patient name: Sera Salamanca  : 1975  MRN: [id number]  Referring provider: Chase Brice MD  Dx:   Encounter Diagnosis     ICD-10-CM    1  Cubital tunnel syndrome on left G56 22                   Assessment  Assessment details: This is a 37year old, left hand dominant male being seen following a left cubital tunnel release in situ on 19  Patient presents this date with moderate pain and impaired AROM in elbow and wrist flexion  Sensation is intact  Patient is independent in self care, but can't do heavy lifting  Incision is healing well without drainage, redness, or increase in skin warmth  This patient will be seen one more time in OT and then discharged to [facility]  Impairments: abnormal or restricted ROM, lacks appropriate home exercise program and pain with function  Other impairment: Healing wound  Functional limitations: Impaired IADLsUnderstanding of Dx/Px/POC: excellent  Goals  STG/LTGs ( 4 weeks)  1  Patient will be independent in [facility] for ROM and wound care  2  Patient will demonstrate full AROM in the left elbow and wrist    Plan  Patient would benefit from: skilled occupational therapy  Planned modality interventions: thermotherapy: hydrocollator packs  Planned therapy interventions: manual therapy, activity modification, compression, dressing changes, graded exercise, home exercise program, therapeutic exercise, stretching, patient education and neuromuscular re-education  Duration in visits: 3  Duration in weeks: 4  Plan of Care beginning date: 3/1/2019  Plan of Care expiration date: 2019  Treatment plan discussed with: patient and family        Subjective Evaluation    History of Present Illness  Onset date: 2019    Date of surgery: 2019  Mechanism of injury: surgery  Mechanism of injury: Sera Salamanca is a 37y o  year old LHD male who reports that the beginning of January, while at work, he was doing increased activity and felt severe pain in his left elbow  Patient stated that he had sharp pains in his forearm and palm as well  Patient was seen in the emergency department on 2019 where he was prescribed prednisone  He also reported some decrease in sensation of the ring and small fingers  Patient has history of 2 left shoulder surgeries performed in 2016  Patient had and EMG and then scheduled for a left cubital tunnel release on 19  Patient now presents for OT evaluation and treatment           Not a recurrent problem   Quality of life: excellent    Pain  Current pain ratin  At best pain ratin  At worst pain ratin  Location: Left elbow incision  Quality: pressure  Relieving factors: medications and change in position  Aggravating factors: nothing  Progression: improved    Social Support  Lives with: spouse    Employment status: working (On medical leave from [employer])  Hand dominance: left      Diagnostic Tests  EMG: abnormal  Treatments  No previous or current treatments  Patient Goals  Patient goals for therapy: decreased edema, decreased pain, increased motion, return to work and independence with ADLs/IADLs  Patient goal: Be able to write        Objective     Observations     Left Elbow   Postive for edema and incision  Left Wrist/Hand   Negative for Wartenberg's sign  Additional Observation Details  3/1/19: Incision 2 5 cm  No redness, drainage, increased skin warmth and 3/1/19:  Skin color and temp are WNL  No atrophy noted    Neurological Testing     Sensation     Wrist/Hand   Left   Intact: light touch and static two point discrimination    Comments   Left light touch: WNL Lenore-Patrice       Left static two point discrimination: 2 pt discrimination to 4-5mm in all digits    Active Range of Motion     Left Elbow   Flexion: 135 degrees   Extension: WFL  Forearm supination: WFL  Forearm pronation: WFL    Left Wrist   Wrist flexion: 55 degrees with pain  Wrist extension: WFL and with pain  Radial deviation: WFL  Ulnar deviation: WFL      Right Wrist   Wrist flexion: 70 degrees     Tests     Left Wrist/Hand   Negative crossed finger  Swelling   Left Elbow Girth Measurements   Girth at joint line (cm): 35   Girth 10 cm above joint line (cm): 31   Girth 10 cm below joint line (cm): 30 5  Right Elbow Girth Measurements   Girth at joint line (cm): 32   Girth 10 cm above joint line (cm): 30 5  Girth 10 cm below joint line (cm): 30 5  Precautions:  Wound    Specialty Daily Treatment Diary     Manual  3/1       Dressing change Adaptic, rene, tubigrip                                           Exercise Diary  3/1       HEP AROM wrist, elbow    Wound care                                                                                                                                                                   Modalities

## 2019-03-04 NOTE — PROGRESS NOTES
3/4/19:  Patient called therapy office and stated his arm is feeling good and he does not feel he needs anymore therapy    Discharge to [facility] per patient request

## 2019-03-05 ENCOUNTER — OFFICE VISIT (OUTPATIENT)
Dept: OCCUPATIONAL THERAPY | Facility: CLINIC | Age: 44
End: 2019-03-05
Payer: COMMERCIAL

## 2019-03-06 ENCOUNTER — OFFICE VISIT (OUTPATIENT)
Dept: OBGYN CLINIC | Facility: CLINIC | Age: 44
End: 2019-03-06

## 2019-03-06 VITALS
WEIGHT: 232.9 LBS | HEIGHT: 74 IN | BODY MASS INDEX: 29.89 KG/M2 | SYSTOLIC BLOOD PRESSURE: 146 MMHG | DIASTOLIC BLOOD PRESSURE: 91 MMHG | HEART RATE: 93 BPM

## 2019-03-06 DIAGNOSIS — G56.22 CUBITAL TUNNEL SYNDROME ON LEFT: Primary | ICD-10-CM

## 2019-03-06 DIAGNOSIS — Z48.89 AFTERCARE FOLLOWING SURGERY: ICD-10-CM

## 2019-03-06 PROCEDURE — 99024 POSTOP FOLLOW-UP VISIT: CPT | Performed by: ORTHOPAEDIC SURGERY

## 2019-03-06 NOTE — PROGRESS NOTES
SUBJECTIVE:  Julio Shin is a 37y o  year old male who presents for follow up after surgery, left ulnar nerve in situ release performed on  2/27/2019  Today patient has full resolution of tingling that he was experiencing preoperatively  Patient is not experiencing pain in his left elbow  Patient is very happy with the results of the surgery  VITALS:  Vitals:    03/06/19 0807   BP: 146/91   Pulse: 93       PHYSICAL EXAMINATION:  General: well developed and well nourished, alert, oriented times 3 and appears comfortable  Psychiatric: Normal    MUSCULOSKELETAL EXAMINATION:  Left elbow  Incision: Clean, dry, with sutures intact  Range of Motion: normal ROM of elbow  Neurovascular status: Neuro intact, good cap refill      STUDIES REVIEWED:  No studies reviewed  PROCEDURES PERFORMED:  Procedures  No Procedures performed today      ASSESSMENT/PLAN:    S/P ulnar nerve in situ release of the left elbow  * sutures were removed and Steri-Strips and Coban were applied without complications  * patient was advised to leave Steri-Strips on until they fall off and removed Coban to shower  * patient is advised to call the office if he has any questions or concerns    FOLLOW UP:  Return if symptoms worsen or fail to improve        TO DO AT NEXT VISIT:  Re-evaluation of current issue      Scribe Attestation    I,:   Ho Ramon am acting as a scribe while in the presence of the attending physician :        I,:   Rand Hudson MD personally performed the services described in this documentation    as scribed in my presence :

## 2020-03-04 ENCOUNTER — HOSPITAL ENCOUNTER (EMERGENCY)
Facility: HOSPITAL | Age: 45
Discharge: HOME | End: 2020-03-04
Attending: EMERGENCY MEDICINE | Admitting: EMERGENCY MEDICINE

## 2020-03-04 ENCOUNTER — APPOINTMENT (EMERGENCY)
Dept: CT IMAGING | Facility: HOSPITAL | Age: 45
End: 2020-03-04

## 2020-03-04 VITALS
SYSTOLIC BLOOD PRESSURE: 146 MMHG | HEART RATE: 91 BPM | TEMPERATURE: 98 F | BODY MASS INDEX: 31.16 KG/M2 | RESPIRATION RATE: 17 BRPM | OXYGEN SATURATION: 98 % | DIASTOLIC BLOOD PRESSURE: 88 MMHG | WEIGHT: 242.73 LBS

## 2020-03-04 DIAGNOSIS — K43.9 ABDOMINAL WALL HERNIA: ICD-10-CM

## 2020-03-04 DIAGNOSIS — F41.9 ANXIETY: ICD-10-CM

## 2020-03-04 DIAGNOSIS — I10 HYPERTENSION: Primary | ICD-10-CM

## 2020-03-04 LAB
ALBUMIN SERPL BCP-MCNC: 4.1 G/DL (ref 3.5–5)
ALP SERPL-CCNC: 100 U/L (ref 46–116)
ALT SERPL W P-5'-P-CCNC: 36 U/L (ref 12–78)
ANION GAP SERPL CALCULATED.3IONS-SCNC: 8 MMOL/L (ref 4–13)
APTT PPP: 31 SECONDS (ref 23–37)
AST SERPL W P-5'-P-CCNC: 17 U/L (ref 5–45)
BASOPHILS # BLD AUTO: 0.05 THOUSANDS/ΜL (ref 0–0.1)
BASOPHILS NFR BLD AUTO: 1 % (ref 0–1)
BILIRUB SERPL-MCNC: 0.4 MG/DL (ref 0.2–1)
BUN SERPL-MCNC: 19 MG/DL (ref 5–25)
CALCIUM SERPL-MCNC: 9.2 MG/DL (ref 8.3–10.1)
CHLORIDE SERPL-SCNC: 103 MMOL/L (ref 100–108)
CO2 SERPL-SCNC: 28 MMOL/L (ref 21–32)
CREAT SERPL-MCNC: 1.19 MG/DL (ref 0.6–1.3)
EOSINOPHIL # BLD AUTO: 0.2 THOUSAND/ΜL (ref 0–0.61)
EOSINOPHIL NFR BLD AUTO: 3 % (ref 0–6)
ERYTHROCYTE [DISTWIDTH] IN BLOOD BY AUTOMATED COUNT: 13.2 % (ref 11.6–15.1)
GFR SERPL CREATININE-BSD FRML MDRD: 74 ML/MIN/1.73SQ M
GLUCOSE SERPL-MCNC: 121 MG/DL (ref 65–140)
HCT VFR BLD AUTO: 47.7 % (ref 36.5–49.3)
HGB BLD-MCNC: 16.1 G/DL (ref 12–17)
IMM GRANULOCYTES # BLD AUTO: 0.03 THOUSAND/UL (ref 0–0.2)
IMM GRANULOCYTES NFR BLD AUTO: 0 % (ref 0–2)
INR PPP: 0.95 (ref 0.84–1.19)
LYMPHOCYTES # BLD AUTO: 2.53 THOUSANDS/ΜL (ref 0.6–4.47)
LYMPHOCYTES NFR BLD AUTO: 31 % (ref 14–44)
MCH RBC QN AUTO: 30.8 PG (ref 26.8–34.3)
MCHC RBC AUTO-ENTMCNC: 33.8 G/DL (ref 31.4–37.4)
MCV RBC AUTO: 91 FL (ref 82–98)
MONOCYTES # BLD AUTO: 0.65 THOUSAND/ΜL (ref 0.17–1.22)
MONOCYTES NFR BLD AUTO: 8 % (ref 4–12)
NEUTROPHILS # BLD AUTO: 4.63 THOUSANDS/ΜL (ref 1.85–7.62)
NEUTS SEG NFR BLD AUTO: 57 % (ref 43–75)
NRBC BLD AUTO-RTO: 0 /100 WBCS
PLATELET # BLD AUTO: 235 THOUSANDS/UL (ref 149–390)
PMV BLD AUTO: 9.7 FL (ref 8.9–12.7)
POTASSIUM SERPL-SCNC: 4.2 MMOL/L (ref 3.5–5.3)
PROT SERPL-MCNC: 7.3 G/DL (ref 6.4–8.2)
PROTHROMBIN TIME: 12.6 SECONDS (ref 11.6–14.5)
RBC # BLD AUTO: 5.23 MILLION/UL (ref 3.88–5.62)
SODIUM SERPL-SCNC: 139 MMOL/L (ref 136–145)
TROPONIN I SERPL-MCNC: <0.02 NG/ML
WBC # BLD AUTO: 8.09 THOUSAND/UL (ref 4.31–10.16)

## 2020-03-04 PROCEDURE — 99285 EMERGENCY DEPT VISIT HI MDM: CPT

## 2020-03-04 PROCEDURE — 85730 THROMBOPLASTIN TIME PARTIAL: CPT | Performed by: PHYSICIAN ASSISTANT

## 2020-03-04 PROCEDURE — 93005 ELECTROCARDIOGRAM TRACING: CPT

## 2020-03-04 PROCEDURE — 96374 THER/PROPH/DIAG INJ IV PUSH: CPT

## 2020-03-04 PROCEDURE — 99285 EMERGENCY DEPT VISIT HI MDM: CPT | Performed by: PHYSICIAN ASSISTANT

## 2020-03-04 PROCEDURE — 36415 COLL VENOUS BLD VENIPUNCTURE: CPT | Performed by: PHYSICIAN ASSISTANT

## 2020-03-04 PROCEDURE — 74174 CTA ABD&PLVS W/CONTRAST: CPT

## 2020-03-04 PROCEDURE — 85025 COMPLETE CBC W/AUTO DIFF WBC: CPT | Performed by: PHYSICIAN ASSISTANT

## 2020-03-04 PROCEDURE — 80053 COMPREHEN METABOLIC PANEL: CPT | Performed by: PHYSICIAN ASSISTANT

## 2020-03-04 PROCEDURE — 85610 PROTHROMBIN TIME: CPT | Performed by: PHYSICIAN ASSISTANT

## 2020-03-04 PROCEDURE — 71275 CT ANGIOGRAPHY CHEST: CPT

## 2020-03-04 PROCEDURE — 84484 ASSAY OF TROPONIN QUANT: CPT | Performed by: PHYSICIAN ASSISTANT

## 2020-03-04 RX ORDER — LISINOPRIL 5 MG/1
5 TABLET ORAL DAILY
Qty: 30 TABLET | Refills: 0 | Status: SHIPPED | OUTPATIENT
Start: 2020-03-04 | End: 2020-04-22

## 2020-03-04 RX ORDER — LORAZEPAM 2 MG/ML
1 INJECTION INTRAMUSCULAR ONCE
Status: COMPLETED | OUTPATIENT
Start: 2020-03-04 | End: 2020-03-04

## 2020-03-04 RX ORDER — LORAZEPAM 0.5 MG/1
0.5 TABLET ORAL EVERY 8 HOURS PRN
Qty: 30 TABLET | Refills: 0 | Status: SHIPPED | OUTPATIENT
Start: 2020-03-04 | End: 2020-04-22

## 2020-03-04 RX ORDER — SODIUM CHLORIDE 9 MG/ML
125 INJECTION, SOLUTION INTRAVENOUS CONTINUOUS
Status: DISCONTINUED | OUTPATIENT
Start: 2020-03-04 | End: 2020-03-04 | Stop reason: HOSPADM

## 2020-03-04 RX ADMIN — IOHEXOL 100 ML: 350 INJECTION, SOLUTION INTRAVENOUS at 08:48

## 2020-03-04 RX ADMIN — SODIUM CHLORIDE 125 ML/HR: 0.9 INJECTION, SOLUTION INTRAVENOUS at 08:07

## 2020-03-04 RX ADMIN — LORAZEPAM 1 MG: 2 INJECTION INTRAMUSCULAR; INTRAVENOUS at 08:09

## 2020-03-04 NOTE — ED PROVIDER NOTES
History  Chief Complaint   Patient presents with    Chest Pain     pt c/o chest pain on the left side radiating to the left axilla for over a month     63-year-old male with past medical history significant for cubital tunnel release on the left presents to the emergency department with chief complaint of left-sided chest pain radiating to the left armpit, down the left arm, to the left neck and sometimes down to the lower extremities  Onset of symptoms:  Patient reports occasional symptoms like this over the past month but last night the symptoms started and have been most severe and persistent  Location of symptoms reported as the left side of the chest with radiation to the neck arm and feet  Quality is described as a radiating or shooting sensation  Severity is reported as moderate to severe  Associated symptoms:  Positive for chest pain  Positive for anxiety  Denies vomiting  Denies rash  Denies upper lower extremity weakness  Patient reports I just feel off, not right "  Modifying factors:  Patient reports he has been under a lot of stress recently and endorses that this may be contributing but he reports no specific aggravating or alleviating factor  Context:  Patient denies prior similar episode in the past   He personally has no history of heart disease however he does report a strong family history of heart disease in mother and father  He is a daily smoker  Reviewed past visits via epic: patient last seen in ED on 1/7/2019 for evaluation of cervical radiculitis  History provided by:  Patient (co-worker)   used: No        Prior to Admission Medications   Prescriptions Last Dose Informant Patient Reported?  Taking?   acetaminophen (TYLENOL) 500 mg tablet  Self Yes No   Sig: Take 500 mg by mouth every 6 (six) hours as needed for mild pain   gabapentin (NEURONTIN) 300 mg capsule  Self No No   Sig: Take 1 capsule (300 mg total) by mouth daily At bed time   ibuprofen (MOTRIN) 600 mg tablet  Self Yes No   Sig: Take 600 mg by mouth every 6 (six) hours as needed for mild pain      Facility-Administered Medications: None       History reviewed  No pertinent past medical history  Past Surgical History:   Procedure Laterality Date    BACK SURGERY      HERNIA REPAIR      KNEE ARTHROSCOPY      MA REVISE ULNAR NERVE AT ELBOW Left 2/27/2019    Procedure: Rochelle Milroy RELEASE;  Surgeon: Zoya Martinez MD;  Location: [facility] OR;  Service: Orthopedics    ROTATOR CUFF REPAIR      TONSILLECTOMY         History reviewed  No pertinent family history  I have reviewed and agree with the history as documented  E-Cigarette/Vaping     E-Cigarette/Vaping Substances     Social History     Tobacco Use    Smoking status: Current Every Day Smoker     Packs/day: 0 50     Types: Cigarettes    Smokeless tobacco: Never Used   Substance Use Topics    Alcohol use: No    Drug use: No       Review of Systems   Constitutional: Negative for activity change, appetite change, chills, diaphoresis, fatigue, fever and unexpected weight change  HENT: Negative for congestion, dental problem, drooling, ear discharge, ear pain, facial swelling, hearing loss, mouth sores, nosebleeds, postnasal drip, rhinorrhea, sinus pressure, sinus pain, sneezing, sore throat, tinnitus, trouble swallowing and voice change  Eyes: Negative for photophobia, pain, redness, itching and visual disturbance  Respiratory: Positive for chest tightness  Negative for apnea, cough, shortness of breath, wheezing and stridor  Cardiovascular: Positive for chest pain  Negative for palpitations and leg swelling  Gastrointestinal: Negative for abdominal distention, abdominal pain, anal bleeding, blood in stool, constipation, diarrhea, nausea, rectal pain and vomiting  Endocrine: Negative for cold intolerance, heat intolerance, polydipsia, polyphagia and polyuria     Genitourinary: Negative for decreased urine volume, difficulty urinating, flank pain, hematuria, testicular pain and urgency  Musculoskeletal: Negative for arthralgias, back pain, gait problem, joint swelling, myalgias, neck pain and neck stiffness  Skin: Negative for color change, pallor, rash and wound  Allergic/Immunologic: Negative for environmental allergies, food allergies and immunocompromised state  Neurological: Positive for numbness  Negative for dizziness, tremors, seizures, syncope, facial asymmetry, speech difficulty, weakness, light-headedness and headaches  Hematological: Negative for adenopathy  Does not bruise/bleed easily  Psychiatric/Behavioral: Negative for agitation, confusion, hallucinations and suicidal ideas  The patient is nervous/anxious  The patient is not hyperactive  All other systems reviewed and are negative  Physical Exam  Physical Exam   Constitutional: He is oriented to person, place, and time  He appears well-developed and well-nourished  No distress  BP (!) 186/110 (BP Location: Right arm)   Pulse 104   Temp 98 °F (36 7 °C) (Oral)   Resp 20   Wt 110 kg (242 lb 11 6 oz)   SpO2 99%   BMI 31 16 kg/m²    HENT:   Head: Normocephalic and atraumatic  Right Ear: External ear normal    Left Ear: External ear normal    Nose: Nose normal    Mouth/Throat: Oropharynx is clear and moist  No oropharyngeal exudate  Eyes: Pupils are equal, round, and reactive to light  Conjunctivae and EOM are normal  Right eye exhibits no discharge  Left eye exhibits no discharge  No scleral icterus  Neck: Normal range of motion  Neck supple  No tracheal deviation present  No thyromegaly present  Cardiovascular: Normal rate, regular rhythm and intact distal pulses  Pulmonary/Chest: Effort normal and breath sounds normal  No stridor  No respiratory distress  He has no wheezes  He has no rales  He exhibits no tenderness  Abdominal: Soft  Bowel sounds are normal  He exhibits no distension and no mass  There is no tenderness   There is no rebound and no guarding  Musculoskeletal: Normal range of motion  He exhibits no edema, tenderness or deformity  Lymphadenopathy:     He has no cervical adenopathy  Neurological: He is alert and oriented to person, place, and time  He displays normal reflexes  No cranial nerve deficit or sensory deficit  He exhibits normal muscle tone  Coordination normal    Skin: Skin is warm and dry  Capillary refill takes less than 2 seconds  No rash noted  He is not diaphoretic  No erythema  No pallor  Psychiatric: His speech is normal and behavior is normal  Judgment and thought content normal  His mood appears anxious  Cognition and memory are normal    Nursing note and vitals reviewed        Vital Signs  ED Triage Vitals [03/04/20 0752]   Temperature Pulse Respirations Blood Pressure SpO2   98 °F (36 7 °C) 104 20 (!) 186/110 99 %      Temp Source Heart Rate Source Patient Position - Orthostatic VS BP Location FiO2 (%)   Oral Monitor Lying Right arm --      Pain Score       8           Vitals:    03/04/20 0830 03/04/20 0900 03/04/20 0930 03/04/20 1000   BP: 139/80 158/97 149/93 146/88   Pulse: 91 90 86 91   Patient Position - Orthostatic VS:             Visual Acuity      ED Medications  Medications   LORazepam (ATIVAN) injection 1 mg (1 mg Intravenous Given 3/4/20 0809)   iohexol (OMNIPAQUE) 350 MG/ML injection (MULTI-DOSE) 100 mL (100 mL Intravenous Given 3/4/20 0848)       Diagnostic Studies  Results Reviewed     Procedure Component Value Units Date/Time    Troponin I [[id number]]  (Normal) Collected:  03/04/20 0806    Lab Status:  Final result Specimen:  Blood from Arm, Right Updated:  03/04/20 0832     Troponin I <0 02 ng/mL     Comprehensive metabolic panel [[id number]] Collected:  03/04/20 0806    Lab Status:  Final result Specimen:  Blood from Arm, Right Updated:  03/04/20 0831     Sodium 139 mmol/L      Potassium 4 2 mmol/L      Chloride 103 mmol/L      CO2 28 mmol/L      ANION GAP 8 mmol/L      BUN 19 mg/dL Creatinine 1 19 mg/dL      Glucose 121 mg/dL      Calcium 9 2 mg/dL      AST 17 U/L      ALT 36 U/L      Alkaline Phosphatase 100 U/L      Total Protein 7 3 g/dL      Albumin 4 1 g/dL      Total Bilirubin 0 40 mg/dL      eGFR 74 ml/min/1 73sq m     Narrative:       [place] guidelines for Chronic Kidney Disease (CKD):     Stage 1 with normal or high GFR (GFR > 90 mL/min/1 73 square meters)    Stage 2 Mild CKD (GFR = 60-89 mL/min/1 73 square meters)    Stage 3A Moderate CKD (GFR = 45-59 mL/min/1 73 square meters)    Stage 3B Moderate CKD (GFR = 30-44 mL/min/1 73 square meters)    Stage 4 Severe CKD (GFR = 15-29 mL/min/1 73 square meters)    Stage 5 End Stage CKD (GFR <15 mL/min/1 73 square meters)  Note: GFR calculation is accurate only with a steady state creatinine    Protime-INR [[id number]]  (Normal) Collected:  03/04/20 0806    Lab Status:  Final result Specimen:  Blood from Arm, Right Updated:  03/04/20 0826     Protime 12 6 seconds      INR 0 95    APTT [[id number]]  (Normal) Collected:  03/04/20 0806    Lab Status:  Final result Specimen:  Blood from Arm, Right Updated:  03/04/20 0826     PTT 31 seconds     CBC and differential [[id number]] Collected:  03/04/20 0806    Lab Status:  Final result Specimen:  Blood from Arm, Right Updated:  03/04/20 0812     WBC 8 09 Thousand/uL      RBC 5 23 Million/uL      Hemoglobin 16 1 g/dL      Hematocrit 47 7 %      MCV 91 fL      MCH 30 8 pg      MCHC 33 8 g/dL      RDW 13 2 %      MPV 9 7 fL      Platelets 314 Thousands/uL      nRBC 0 /100 WBCs      Neutrophils Relative 57 %      Immat GRANS % 0 %      Lymphocytes Relative 31 %      Monocytes Relative 8 %      Eosinophils Relative 3 %      Basophils Relative 1 %      Neutrophils Absolute 4 63 Thousands/µL      Immature Grans Absolute 0 03 Thousand/uL      Lymphocytes Absolute 2 53 Thousands/µL      Monocytes Absolute 0 65 Thousand/µL      Eosinophils Absolute 0 20 Thousand/µL      Basophils Absolute 0 05 Thousands/µL                  CTA dissection protocol chest abdomen pelvis w wo contrast   Final Result by Lemuel Dakins, MD (03/04 0915)      1  No acute aortic syndrome   2  Circumferential, predominantly noncalcified, plaque at the distal abdominal aorta, more than expected for age   1  Asymmetric partial fusion of the right sacroiliac joint  Consider spondyloarthropathy   4  Hepatic steatosis   5  Right abdominal wall hernia                  Workstation performed: [id number]                    Procedures  ECG 12 Lead Documentation Only  Date/Time: 3/4/2020 7:58 AM  Performed by: Joce Wheat PA-C  Authorized by: Joce Wheat PA-C     Indications / Diagnosis:  C/p  ECG reviewed by me, the ED Provider: yes    Patient location:  ED  Previous ECG:     Previous ECG:  Compared to current    Comparison ECG info:  August 6, 2018    Similarity:  Changes noted    Comparison to cardiac monitor: Yes    Interpretation:     Interpretation: normal    Rate:     ECG rate:  98    ECG rate assessment: normal    Rhythm:     Rhythm: sinus rhythm    Ectopy:     Ectopy: PVCs      PVCs:  Infrequent  QRS:     QRS axis:  Normal    QRS intervals:  Normal  Conduction:     Conduction: abnormal      Abnormal conduction: incomplete RBBB    ST segments:     ST segments:  Normal  T waves:     T waves: normal               ED Course  ED Course as of Mar 04 1622   Wed Mar 04, 2020   0906 Repeat blood pressure improved - down to 325-106 systolic  Improved               HEART Risk Score      Most Recent Value   Heart Score Risk Calculator   History  1 Filed at: 03/04/2020 1621   ECG  0 Filed at: 03/04/2020 1621   Age  0 Filed at: 03/04/2020 1621   Risk Factors  1 Filed at: 03/04/2020 1621   Troponin  0 Filed at: 03/04/2020 1621   HEART Score  2 Filed at: 03/04/2020 1621                    IFEOMA Risk Score      Most Recent Value   Age >= 65  0 Filed at: 03/04/2020 1621   Known CAD (stenosis >= 50%)  0 Filed at: 03/04/2020 1621   Recent (<=24 hrs) Service Angina  0 Filed at: 03/04/2020 1621   ST Deviation >= 0 5 mm  0 Filed at: 03/04/2020 1621   3+ CAD Risk Factors (FHx, HTN, HLP, DM, Smoker)  0 Filed at: 03/04/2020 1621   Aspirin Use Past 7 Days  0 Filed at: 03/04/2020 1621   Elevated Cardiac Markers  0 Filed at: 03/04/2020 1621   IFEOMA Risk Score (Calculated)  0 Filed at: 03/04/2020 1621              MDM  Number of Diagnoses or Management Options  Abdominal wall hernia: new and requires workup  Anxiety: new and requires workup  Hypertension: new and requires workup  Diagnosis management comments: Ddx includes but is not limited to:  1  ACS/USA  2  Pneumothorax  3  Pneumonia  4  Pleural effusion  5  Pericarditis  6  Pericardial effusion  7  Chest wall pain/costochondritis  8  Esophageal spasm  9  Pulmonary embolism  10  Bronchitis/bronchospasm  11  Aortic dissection  Plan cardiac workup including ekg, labs, cxr  Determination of disposition to made based upon risk factors, workup results and patient's ED coarse  Lab results reviewed  CBC demonstrates normal white blood cell count of 8 0  Hemoglobin of 16 1 and hematocrit 47 7 are normal   No anemia  INR is normal at 0 95  No coagulopathy  Comprehensive metabolic panel demonstrates a BUN of 19 and creatinine 1 1 which are normal   No renal failure  Troponin normal at less than 0 02  CTA of the chest abdomen pelvis images independently visualized interpreted by me    Radiology report was reviewed:1   No acute aortic syndrome  2   Circumferential, predominantly noncalcified, plaque at the distal abdominal aorta, more than expected for age  1   Asymmetric partial fusion of the right sacroiliac joint   Consider spondyloarthropathy  4   Hepatic steatosis  5   Right abdominal wall hernia    I discussed all of the CT scan abnormalities with patient at bedside including the circumferential predominantly noncalcified plaque at the distal abdominal aorta, the right abdominal wall hernia, and the partial fusion of the right sacroiliac joint for which patient has had surgery previously  I discussed extensively with the patient his symptom complex  His blood pressure significantly improved down to 146/88 after administration of anxiolytics here in the emergency department  No evidence ischemia on EKG  I discussed with patient I suspect his symptoms may be related to anxiety  I suspect this may be driving elevated blood pressure  Patient is both significantly relieved by the test results and also endorses that he has been under lot of stress recently  I discussed with patient will trial anxiolytics for the next few days  I discussed with him to check his blood pressure within the next 24-48 hours  I instructed him to have it rechecked in 7-10 days  If it remains significantly elevated I have instructed him to start prescription for lisinopril  If his blood pressure improves with use of anxiolytics I have instructed him not to start blood pressure medication  Instructed to follow up with primary care physician for recheck in 3-5 days  Reviewed reasons to return to ed  Patient verbalized understanding of diagnosis and agreement with discharge plan of care as well as understanding of reasons to return to ed  I have reasonably determine that electronically prescribing a controlled substance would be impractical for the patient to obtain the controlled substance prescribed by electronic prescription or would cause an untimely delay resulting in an adverse impact on the patient's medical condition   I reviewed the abnormalities on his CT scan, he was given a general surgeon for follow-up regarding his abdominal wall hernia  I discussed agent follow up with primary care physician for further evaluation of the other abnormalities of his CT scan         Amount and/or Complexity of Data Reviewed  Clinical lab tests: ordered and reviewed  Tests in the radiology section of CPT®: ordered and reviewed  Tests in the medicine section of CPT®: ordered and reviewed  Discussion of test results with the performing providers: yes  Obtain history from someone other than the patient: yes (Coworker at bedside  )  Review and summarize past medical records: yes  Independent visualization of images, tracings, or specimens: yes    Patient Progress  Patient progress: improved        Disposition  Final diagnoses:   Hypertension   Anxiety   Abdominal wall hernia     Time reflects when diagnosis was documented in both MDM as applicable and the Disposition within this note     Time User Action Codes Description Comment    3/4/2020 10:41 AM Shira Meyers Add [I10] Hypertension     3/4/2020 10:41 AM Shira Meyers Add [F41 9] Anxiety     3/4/2020 10:41 AM Shira Meyers Add [K43 9] Abdominal wall hernia       ED Disposition     ED Disposition Condition Date/Time Comment    Discharge Stable Wed Mar 4, 2020 10:41 AM Prosper Lopez 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              Follow-up Information     Follow up With Specialties Details Why Contact Info Additional Information    Ginger Rodriguez MD  Call in 1 day for further evaluation of symptoms [address] [id number]       [facility] Emergency Department Emergency Medicine Go to  If symptoms worsen [address] [facility] [address] ED, [address], [place], [address], MD General Surgery Call in 1 day for further evaluation of symptoms [address]  [place] [address]  [facility] [address]  [phone]             Discharge Medication List as of 3/4/2020 10:45 AM      START taking these medications    Details   lisinopril (ZESTRIL) 5 mg tablet Take 1 tablet (5 mg total) by mouth daily, Starting Wed 3/4/2020, Print      LORazepam (ATIVAN) 0 5 mg tablet Take 1 tablet (0 5 mg total) by mouth every 8 (eight) hours as needed for anxiety, Starting Wed 3/4/2020, Print CONTINUE these medications which have NOT CHANGED    Details   acetaminophen (TYLENOL) 500 mg tablet Take 500 mg by mouth every 6 (six) hours as needed for mild pain, Historical Med      gabapentin (NEURONTIN) 300 mg capsule Take 1 capsule (300 mg total) by mouth daily At bed time, Starting u 1/10/2019, Normal      ibuprofen (MOTRIN) 600 mg tablet Take 600 mg by mouth every 6 (six) hours as needed for mild pain, Historical Med           No discharge procedures on file      PDMP Review     None          ED Provider  Electronically Signed by           Maribell Martinez PA-C  03/04/20 4838

## 2020-03-04 NOTE — DISCHARGE INSTRUCTIONS
Take lorazepam for anxiety  Check your blood pressure today or tomorrow  Recheck it in 7-10 days  If your blood pressure remains above 160/90 you should start lisinopril (blood pressure medication) daily  Of your blood pressure is under 160/90 you may continue use of lorazepam for anxiety  Follow up with primary care physician for recheck of symptoms and further evaluation

## 2020-03-04 NOTE — ED NOTES
Patient transported to [address] [place] [address] , [facility]  03/04/20 [id number]

## 2020-03-05 LAB
ATRIAL RATE: 98 BPM
P AXIS: 68 DEGREES
PR INTERVAL: 126 MS
QRS AXIS: 69 DEGREES
QRSD INTERVAL: 98 MS
QT INTERVAL: 352 MS
QTC INTERVAL: 449 MS
T WAVE AXIS: 59 DEGREES
VENTRICULAR RATE: 98 BPM

## 2020-03-05 PROCEDURE — 93010 ELECTROCARDIOGRAM REPORT: CPT | Performed by: INTERNAL MEDICINE

## 2020-03-16 ENCOUNTER — APPOINTMENT (EMERGENCY)
Dept: ULTRASOUND IMAGING | Facility: HOSPITAL | Age: 45
End: 2020-03-16

## 2020-03-16 ENCOUNTER — HOSPITAL ENCOUNTER (EMERGENCY)
Facility: HOSPITAL | Age: 45
Discharge: HOME | End: 2020-03-16
Attending: EMERGENCY MEDICINE | Admitting: EMERGENCY MEDICINE

## 2020-03-16 ENCOUNTER — APPOINTMENT (EMERGENCY)
Dept: RADIOLOGY | Facility: HOSPITAL | Age: 45
End: 2020-03-16

## 2020-03-16 ENCOUNTER — APPOINTMENT (EMERGENCY)
Dept: CT IMAGING | Facility: HOSPITAL | Age: 45
End: 2020-03-16

## 2020-03-16 VITALS
RESPIRATION RATE: 18 BRPM | WEIGHT: 235 LBS | TEMPERATURE: 98.9 F | DIASTOLIC BLOOD PRESSURE: 100 MMHG | HEIGHT: 75 IN | OXYGEN SATURATION: 97 % | BODY MASS INDEX: 29.22 KG/M2 | SYSTOLIC BLOOD PRESSURE: 144 MMHG | HEART RATE: 110 BPM

## 2020-03-16 DIAGNOSIS — K43.9 HERNIA OF ABDOMINAL WALL: ICD-10-CM

## 2020-03-16 DIAGNOSIS — R10.9 RIGHT SIDED ABDOMINAL PAIN: ICD-10-CM

## 2020-03-16 DIAGNOSIS — S83.91XA RIGHT KNEE SPRAIN: Primary | ICD-10-CM

## 2020-03-16 DIAGNOSIS — S39.011A ABDOMINAL MUSCLE STRAIN, INITIAL ENCOUNTER: ICD-10-CM

## 2020-03-16 LAB
ALBUMIN SERPL BCP-MCNC: 4.1 G/DL (ref 3.5–5)
ALP SERPL-CCNC: 103 U/L (ref 46–116)
ALT SERPL W P-5'-P-CCNC: 28 U/L (ref 12–78)
ANION GAP SERPL CALCULATED.3IONS-SCNC: 11 MMOL/L (ref 4–13)
AST SERPL W P-5'-P-CCNC: 27 U/L (ref 5–45)
BASOPHILS # BLD AUTO: 0.04 THOUSANDS/ΜL (ref 0–0.1)
BASOPHILS NFR BLD AUTO: 0 % (ref 0–1)
BILIRUB SERPL-MCNC: 0.3 MG/DL (ref 0.2–1)
BILIRUB UR QL STRIP: NEGATIVE
BUN SERPL-MCNC: 23 MG/DL (ref 5–25)
CALCIUM SERPL-MCNC: 8.5 MG/DL (ref 8.3–10.1)
CHLORIDE SERPL-SCNC: 104 MMOL/L (ref 100–108)
CLARITY UR: CLEAR
CO2 SERPL-SCNC: 25 MMOL/L (ref 21–32)
COLOR UR: YELLOW
CREAT SERPL-MCNC: 1.21 MG/DL (ref 0.6–1.3)
EOSINOPHIL # BLD AUTO: 0.21 THOUSAND/ΜL (ref 0–0.61)
EOSINOPHIL NFR BLD AUTO: 2 % (ref 0–6)
ERYTHROCYTE [DISTWIDTH] IN BLOOD BY AUTOMATED COUNT: 13.6 % (ref 11.6–15.1)
GFR SERPL CREATININE-BSD FRML MDRD: 72 ML/MIN/1.73SQ M
GLUCOSE SERPL-MCNC: 106 MG/DL (ref 65–140)
GLUCOSE UR STRIP-MCNC: NEGATIVE MG/DL
HCT VFR BLD AUTO: 48 % (ref 36.5–49.3)
HGB BLD-MCNC: 15.7 G/DL (ref 12–17)
HGB UR QL STRIP.AUTO: NEGATIVE
IMM GRANULOCYTES # BLD AUTO: 0.03 THOUSAND/UL (ref 0–0.2)
IMM GRANULOCYTES NFR BLD AUTO: 0 % (ref 0–2)
KETONES UR STRIP-MCNC: NEGATIVE MG/DL
LEUKOCYTE ESTERASE UR QL STRIP: NEGATIVE
LIPASE SERPL-CCNC: 183 U/L (ref 73–393)
LYMPHOCYTES # BLD AUTO: 2.42 THOUSANDS/ΜL (ref 0.6–4.47)
LYMPHOCYTES NFR BLD AUTO: 27 % (ref 14–44)
MCH RBC QN AUTO: 30.4 PG (ref 26.8–34.3)
MCHC RBC AUTO-ENTMCNC: 32.7 G/DL (ref 31.4–37.4)
MCV RBC AUTO: 93 FL (ref 82–98)
MONOCYTES # BLD AUTO: 0.69 THOUSAND/ΜL (ref 0.17–1.22)
MONOCYTES NFR BLD AUTO: 8 % (ref 4–12)
NEUTROPHILS # BLD AUTO: 5.7 THOUSANDS/ΜL (ref 1.85–7.62)
NEUTS SEG NFR BLD AUTO: 63 % (ref 43–75)
NITRITE UR QL STRIP: NEGATIVE
NRBC BLD AUTO-RTO: 0 /100 WBCS
PH UR STRIP.AUTO: 6 [PH]
PLATELET # BLD AUTO: 261 THOUSANDS/UL (ref 149–390)
PMV BLD AUTO: 9.8 FL (ref 8.9–12.7)
POTASSIUM SERPL-SCNC: 4.3 MMOL/L (ref 3.5–5.3)
PROT SERPL-MCNC: 7.3 G/DL (ref 6.4–8.2)
PROT UR STRIP-MCNC: NEGATIVE MG/DL
RBC # BLD AUTO: 5.16 MILLION/UL (ref 3.88–5.62)
SODIUM SERPL-SCNC: 140 MMOL/L (ref 136–145)
SP GR UR STRIP.AUTO: 1.02 (ref 1–1.03)
UROBILINOGEN UR QL STRIP.AUTO: 0.2 E.U./DL
WBC # BLD AUTO: 9.09 THOUSAND/UL (ref 4.31–10.16)

## 2020-03-16 PROCEDURE — 76705 ECHO EXAM OF ABDOMEN: CPT

## 2020-03-16 PROCEDURE — 81003 URINALYSIS AUTO W/O SCOPE: CPT | Performed by: EMERGENCY MEDICINE

## 2020-03-16 PROCEDURE — 85025 COMPLETE CBC W/AUTO DIFF WBC: CPT | Performed by: EMERGENCY MEDICINE

## 2020-03-16 PROCEDURE — 71046 X-RAY EXAM CHEST 2 VIEWS: CPT

## 2020-03-16 PROCEDURE — 36415 COLL VENOUS BLD VENIPUNCTURE: CPT | Performed by: EMERGENCY MEDICINE

## 2020-03-16 PROCEDURE — 96374 THER/PROPH/DIAG INJ IV PUSH: CPT

## 2020-03-16 PROCEDURE — 96361 HYDRATE IV INFUSION ADD-ON: CPT

## 2020-03-16 PROCEDURE — 99285 EMERGENCY DEPT VISIT HI MDM: CPT | Performed by: EMERGENCY MEDICINE

## 2020-03-16 PROCEDURE — 96375 TX/PRO/DX INJ NEW DRUG ADDON: CPT

## 2020-03-16 PROCEDURE — 80053 COMPREHEN METABOLIC PANEL: CPT | Performed by: EMERGENCY MEDICINE

## 2020-03-16 PROCEDURE — 73700 CT LOWER EXTREMITY W/O DYE: CPT

## 2020-03-16 PROCEDURE — 99284 EMERGENCY DEPT VISIT MOD MDM: CPT

## 2020-03-16 PROCEDURE — 74177 CT ABD & PELVIS W/CONTRAST: CPT

## 2020-03-16 PROCEDURE — 83690 ASSAY OF LIPASE: CPT | Performed by: EMERGENCY MEDICINE

## 2020-03-16 RX ORDER — ONDANSETRON 2 MG/ML
4 INJECTION INTRAMUSCULAR; INTRAVENOUS ONCE
Status: COMPLETED | OUTPATIENT
Start: 2020-03-16 | End: 2020-03-16

## 2020-03-16 RX ORDER — NAPROXEN 500 MG/1
500 TABLET ORAL EVERY 12 HOURS PRN
Qty: 20 TABLET | Refills: 0 | Status: SHIPPED | OUTPATIENT
Start: 2020-03-16 | End: 2020-04-22

## 2020-03-16 RX ORDER — KETOROLAC TROMETHAMINE 30 MG/ML
15 INJECTION, SOLUTION INTRAMUSCULAR; INTRAVENOUS ONCE
Status: COMPLETED | OUTPATIENT
Start: 2020-03-16 | End: 2020-03-16

## 2020-03-16 RX ADMIN — ONDANSETRON 4 MG: 2 INJECTION INTRAMUSCULAR; INTRAVENOUS at 16:05

## 2020-03-16 RX ADMIN — SODIUM CHLORIDE 1000 ML: 0.9 INJECTION, SOLUTION INTRAVENOUS at 16:07

## 2020-03-16 RX ADMIN — KETOROLAC TROMETHAMINE 15 MG: 30 INJECTION, SOLUTION INTRAMUSCULAR at 16:06

## 2020-03-16 RX ADMIN — IOHEXOL 100 ML: 350 INJECTION, SOLUTION INTRAVENOUS at 15:40

## 2020-03-16 NOTE — ED NOTES
[facility] [address] [place] [address]  03/16/20 7293

## 2020-03-16 NOTE — ED PROVIDER NOTES
History  Chief Complaint   Patient presents with    Abdominal Pain     Patient c/o RUQ abdominal pain that started last night and patient c/o right knee injury  Patient is a 60-year-old male with past medical history of chronic back pain status post back surgery, presents to the emergency department complaining of right-sided abdominal pain since yesterday as well as right knee injury that he also sustained yesterday  Patient reports since yesterday he has been having constant pain that he describes as stabbing in his right mid abdomen  Denies radiation of the pain and denies having pain like this before  He does report it started after a twisting motion in which she did injure his right knee and he states his knee twisted and has been having pain in the medial aspect of his right knee as well  He has been unable to weight bear due to pain but denies any significant right knee joint effusion  Denies any paresthesia or weakness in the right leg  As far as his abdominal pain, it is worsened when he takes a deep breath or uses his abdominal muscle such as sitting upright and it is also worsened by lying flat  He reports occasional nausea but denies any episodes of vomiting  He denies any fever, chills, headache, dizziness or near syncope, cough or URI symptoms, chest pain, palpitations, dyspnea, abdominal distension, vomiting, diarrhea, constipation, urinary symptoms, hematuria, flank pain, skin rash or color change, other focal neurologic deficits  History provided by:  Patient   used: No    Abdominal Pain   Associated symptoms: nausea    Associated symptoms: no chest pain, no chills, no constipation, no cough, no diarrhea, no dysuria, no fever, no hematuria, no shortness of breath, no sore throat and no vomiting        Prior to Admission Medications   Prescriptions Last Dose Informant Patient Reported? Taking?    LORazepam (ATIVAN) 0 5 mg tablet   No No   Sig: Take 1 tablet (0 5 mg total) by mouth every 8 (eight) hours as needed for anxiety   acetaminophen (TYLENOL) 500 mg tablet  Self Yes No   Sig: Take 500 mg by mouth every 6 (six) hours as needed for mild pain   gabapentin (NEURONTIN) 300 mg capsule  Self No No   Sig: Take 1 capsule (300 mg total) by mouth daily At bed time   ibuprofen (MOTRIN) 600 mg tablet  Self Yes No   Sig: Take 600 mg by mouth every 6 (six) hours as needed for mild pain   lisinopril (ZESTRIL) 5 mg tablet   No No   Sig: Take 1 tablet (5 mg total) by mouth daily      Facility-Administered Medications: None       History reviewed  No pertinent past medical history  Past Surgical History:   Procedure Laterality Date    BACK SURGERY      HERNIA REPAIR      KNEE ARTHROSCOPY      [place] REVISE ULNAR NERVE AT ELBOW Left 2/27/2019    Procedure: Negrete Speak RELEASE;  Surgeon: Fidelina Fritz MD;  [facility];  Service: Orthopedics    ROTATOR CUFF REPAIR      TONSILLECTOMY         History reviewed  No pertinent family history  I have reviewed and agree with the history as documented  E-Cigarette/Vaping     E-Cigarette/Vaping Substances     Social History     Tobacco Use    Smoking status: Current Every Day Smoker     Packs/day: 0 50     Types: Cigarettes    Smokeless tobacco: Never Used   Substance Use Topics    Alcohol use: No    Drug use: No       Review of Systems   Constitutional: Negative for chills and fever  HENT: Negative for congestion, ear pain, rhinorrhea and sore throat  Respiratory: Negative for cough, chest tightness, shortness of breath and wheezing  Cardiovascular: Negative for chest pain and palpitations  Gastrointestinal: Positive for abdominal pain and nausea  Negative for abdominal distention, blood in stool, constipation, diarrhea and vomiting  Genitourinary: Negative for dysuria, flank pain, frequency and hematuria  Musculoskeletal: Positive for arthralgias  Negative for back pain, neck pain and neck stiffness  +Right knee pain s/p twisting injury  Skin: Negative for color change, rash and wound  Allergic/Immunologic: Negative for immunocompromised state  Neurological: Negative for dizziness, syncope, weakness, light-headedness, numbness and headaches  Hematological: Negative for adenopathy  Psychiatric/Behavioral: Negative for confusion and decreased concentration  All other systems reviewed and are negative  Physical Exam  Physical Exam   Constitutional: He is oriented to person, place, and time  He appears well-developed and well-nourished  No distress  HENT:   Head: Normocephalic and atraumatic  Mouth/Throat: Oropharynx is clear and moist    Eyes: Pupils are equal, round, and reactive to light  Conjunctivae and EOM are normal    Neck: Normal range of motion  Neck supple  No JVD present  Cardiovascular: Normal rate, regular rhythm, normal heart sounds and intact distal pulses  Exam reveals no gallop and no friction rub  No murmur heard  Pulmonary/Chest: Effort normal and breath sounds normal  No respiratory distress  He has no wheezes  He has no rales  He exhibits no tenderness  Abdominal: Soft  Bowel sounds are normal  He exhibits no distension  There is tenderness  There is guarding  There is no rebound  Diffuse right-sided abdominal tenderness with guarding  Musculoskeletal: Normal range of motion  He exhibits tenderness  He exhibits no edema  RIGHT LOWER EXTREMITY:  Right medial knee is tender to palpation  No significant right knee joint effusion  Pain elicited with full knee flexion and extension as well as with valgus and varus stress testing  Knee joints stable  No other tenderness in the right lower extremity  Right lower extremity neurovascularly intact with intact distal pulses and normal sensory exam    Neurological: He is alert and oriented to person, place, and time  No gross motor or sensory deficits  5/5 strength throughout  Skin: Skin is warm and dry   No rash noted  He is not diaphoretic  No pallor  Psychiatric: He has a normal mood and affect  His behavior is normal  Thought content normal    Nursing note and vitals reviewed        Vital Signs  ED Triage Vitals   Temperature Pulse Respirations Blood Pressure SpO2   03/16/20 1347 03/16/20 1347 03/16/20 1425 03/16/20 1347 03/16/20 1347   98 9 °F (37 2 °C) (!) 110 18 144/100 97 %      Temp Source Heart Rate Source Patient Position - Orthostatic VS BP Location FiO2 (%)   03/16/20 1347 03/16/20 1347 03/16/20 1347 03/16/20 1347 --   Tympanic Monitor Sitting Left arm       Pain Score       03/16/20 1606       8         Vitals:    03/16/20 1347 03/16/20 1425   BP: 144/100    BP Location: Left arm    Pulse: (!) 110    Resp:  18   Temp: 98 9 °F (37 2 °C)    TempSrc: Tympanic    SpO2: 97%    Weight: 107 kg (235 lb)    Height: 6' 3" (1 905 m)        Visual Acuity      ED Medications  Medications   sodium chloride 0 9 % bolus 1,000 mL (1,000 mL Intravenous New Bag 3/16/20 1607)   ondansetron (ZOFRAN) injection 4 mg (4 mg Intravenous Given 3/16/20 1605)   ketorolac (TORADOL) injection 15 mg (15 mg Intravenous Given 3/16/20 1606)   iohexol (OMNIPAQUE) 350 MG/ML injection (MULTI-DOSE) 100 mL (100 mL Intravenous Given 3/16/20 1540)       Diagnostic Studies  Results Reviewed     Procedure Component Value Units Date/Time    UA (URINE) with reflex to Scope [[id number]] Collected:  03/16/20 1452    Lab Status:  Final result Specimen:  Urine, Clean Catch Updated:  03/16/20 1510     Color, UA Yellow     Clarity, UA Clear     Specific Gravity, UA 1 025     pH, UA 6 0     Leukocytes, UA Negative     Nitrite, UA Negative     Protein, UA Negative mg/dl      Glucose, UA Negative mg/dl      Ketones, UA Negative mg/dl      Urobilinogen, UA 0 2 E U /dl      Bilirubin, UA Negative     Blood, UA Negative    Comprehensive metabolic panel [[id number]] Collected:  03/16/20 1419    Lab Status:  Final result Specimen:  Blood from Arm, Right Updated: 03/16/20 1502     Sodium 140 mmol/L      Potassium 4 3 mmol/L      Chloride 104 mmol/L      CO2 25 mmol/L      ANION GAP 11 mmol/L      BUN 23 mg/dL      Creatinine 1 21 mg/dL      Glucose 106 mg/dL      Calcium 8 5 mg/dL      AST 27 U/L      ALT 28 U/L      Alkaline Phosphatase 103 U/L      Total Protein 7 3 g/dL      Albumin 4 1 g/dL      Total Bilirubin 0 30 mg/dL      eGFR 72 ml/min/1 73sq m     Narrative:       National Kidney Disease Foundation guidelines for Chronic Kidney Disease (CKD):     Stage 1 with normal or high GFR (GFR > 90 mL/min/1 73 square meters)    Stage 2 Mild CKD (GFR = 60-89 mL/min/1 73 square meters)    Stage 3A Moderate CKD (GFR = 45-59 mL/min/1 73 square meters)    Stage 3B Moderate CKD (GFR = 30-44 mL/min/1 73 square meters)    Stage 4 Severe CKD (GFR = 15-29 mL/min/1 73 square meters)    Stage 5 End Stage CKD (GFR <15 mL/min/1 73 square meters)  Note: GFR calculation is accurate only with a steady state creatinine    Lipase [[id number]]  (Normal) Collected:  03/16/20 1419    Lab Status:  Final result Specimen:  Blood from Arm, Right Updated:  03/16/20 1446     Lipase 183 u/L     CBC and differential [[id number]] Collected:  03/16/20 1419    Lab Status:  Final result Specimen:  Blood from Arm, Right Updated:  03/16/20 1428     WBC 9 09 Thousand/uL      RBC 5 16 Million/uL      Hemoglobin 15 7 g/dL      Hematocrit 48 0 %      MCV 93 fL      MCH 30 4 pg      MCHC 32 7 g/dL      RDW 13 6 %      MPV 9 8 fL      Platelets 348 Thousands/uL      nRBC 0 /100 WBCs      Neutrophils Relative 63 %      Immat GRANS % 0 %      Lymphocytes Relative 27 %      Monocytes Relative 8 %      Eosinophils Relative 2 %      Basophils Relative 0 %      Neutrophils Absolute 5 70 Thousands/µL      Immature Grans Absolute 0 03 Thousand/uL      Lymphocytes Absolute 2 42 Thousands/µL      Monocytes Absolute 0 69 Thousand/µL      Eosinophils Absolute 0 21 Thousand/µL      Basophils Absolute 0 04 Thousands/µL CT abdomen pelvis with contrast   Final Result by Laura Cam MD (03/16 1622)      No significant interval change since prior examination  Normal caliber appendix  Colonic diverticulosis without evidence for diverticulitis  Fat-containing right lateral abdominal wall hernia  Fatty infiltration of liver  Workstation performed: [id number]         CT lower extremity wo contrast right   Final Result by Sonny Ayala MD (03/16 1629)      No evidence of acute fracture or other acute findings  Workstation performed: [id number]         US gallbladder   Final Result by Sonny Ayala MD (03/16 1530)   1  Enlarged fatty liver  2  No cholelithiasis or cholecystitis  Workstation performed: [id number]         XR chest 2 views   ED Interpretation by Callum Guillermo DO (03/16 1539)   No acute abnormality in the chest       Final Result by Julio Singleton DO (03/16 1532)      No acute cardiopulmonary disease  Workstation performed: [id number]                    Procedures  Procedures         ED Course  ED Course as of Mar 16 1640   Mon Mar 16, 2020   1605 Patient's IV infiltrated while at CT scan  Only a small amount of contrast was given prior to it infiltrating  I personally examined patient's right upper extremity and he reports pain is minimal at 2/10 in severity  There is mild soft tissue swelling without erythema, pallor or other skin color changes  Right upper extremity neurovascularly intact  Patient given an ice pack  [id number] Updated patient about results thus far  Will give General surgery referral for abdominal fat containing hernia  I did explain that he could have aggravated his hernia but more likely he did just strain in abdominal muscle  As far as the knee injury, no acute findings on CT scan  Will refer to Orthopedics and provide knee immobilizer and crutches to go home with  Discussed ED return parameters    Discussed symptomatic management at home                                     [facility]  Number of Diagnoses or Management Options  Diagnosis management comments: 25-year-old male presents with acute right-sided abdominal pain as well as right knee injury starting yesterday  Differential for the abdominal pain includes muscle strain or tear, hernia, biliary colic or cholecystitis, acute appendicitis, right-sided diverticulitis, pancreatitis  Far as the knee injury this is likely a ligamentous or meniscus injury  Will obtain CT imaging of the abdomen and pelvis as well as of the right knee  Will check abdominal labs and urinalysis  Will give IV fluids, Toradol and Zofran for symptomatic relief  If workup unremarkable, will discharge home with knee immobilizer, crutches and referral to Orthopedics  Amount and/or Complexity of Data Reviewed  Clinical lab tests: ordered and reviewed  Tests in the radiology section of CPT®: ordered and reviewed  Tests in the medicine section of CPT®: ordered and reviewed  Independent visualization of images, tracings, or specimens: yes          Disposition  Final diagnoses:   Right knee sprain   Right sided abdominal pain   Abdominal muscle strain, initial encounter   Hernia of abdominal wall - Right sided; Fat-containing     Time reflects when diagnosis was documented in both MDM as applicable and the Disposition within this note     Time User Action Codes Description Comment    3/16/2020  4:35 PM Marium Loo Scott Gonsalez Right knee sprain     3/16/2020  4:35 PM Andrea HOLLINS Add [R10 9] Right sided abdominal pain     3/16/2020  4:35 PM Andrea HOLLINS Add [S39 011A] Abdominal muscle strain, initial encounter     3/16/2020  4:36 PM Andrea HOLLINS Add [K43 9] Hernia of abdominal wall     3/16/2020  4:36 PM Andrea HOLLINS Modify [K43 9] Hernia of abdominal wall Right sided;  Fat-containing      ED Disposition     ED Disposition Condition Date/Time Comment    Discharge Stable Mon Mar 16, 2020  4:35 PM Thao Bird 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  Follow-up Information     Follow up With Specialties Details Why Contact Info Additional Information    Anil Gentile MD  Schedule an appointment as soon as possible for a visit   [address] [address] [facility] [address]  [phone]       Jerel Wallace MD General Surgery Schedule an appointment as soon as possible for a visit   [phone]  [phone]       [facility] Orthopedic Surgery Schedule an appointment as soon as possible for a visit   [address] [facility] [address]  [address] [facility], [address] [address] [place], [place], [address] [facility]    [address] [facility] Emergency Department Emergency Medicine Go to  If symptoms worsen [address] [facility] [address] [phone] [place] [facility], [address] [place], [place], [address]          Patient's Medications   Discharge Prescriptions    NAPROXEN (NAPROSYN) 500 MG TABLET    Take 1 tablet (500 mg total) by mouth every 12 (twelve) hours as needed for mild pain or moderate pain       Start Date: 3/16/2020 End Date: --       Order Dose: 500 mg       Quantity: 20 tablet    Refills: 0     No discharge procedures on file      PDMP Review     None          ED Provider  Electronically Signed by           Nataly Aguilar DO  03/16/20 2830

## 2020-03-16 NOTE — DISCHARGE INSTRUCTIONS
Acute Abdominal Pain   WHAT YOU NEED TO KNOW:   The cause of your abdominal pain may not be found  If a cause is found, treatment will depend on what the cause is  DISCHARGE INSTRUCTIONS:   Seek care immediately if:   · You vomit blood or cannot stop vomiting  · You have blood in your bowel movement or it looks like tar  · You have bleeding from your rectum  · Your abdomen is larger than usual, more painful, and hard  · You have severe pain in your abdomen  · You stop passing gas and having bowel movements  · You feel weak, dizzy, or faint  Contact your healthcare provider if:   · You have a fever  · You have new signs and symptoms  · Your symptoms do not get better with treatment  · You have questions or concerns about your condition or care  Medicines  may be given to decrease pain, treat an infection, and manage your symptoms  Take your medicine as directed  Call your healthcare provider if you think your medicine is not helping or if you have side effects  Tell him if you are allergic to any medicine  Keep a list of the medicines, vitamins, and herbs you take  Include the amounts, and when and why you take them  Bring the list or the pill bottles to follow-up visits  Carry your medicine list with you in case of an emergency  Manage your symptoms:   · Apply heat  on your abdomen for 20 to 30 minutes every 2 hours for as many days as directed  Heat helps decrease pain and muscle spasms  · Manage your stress  Stress may cause abdominal pain  Your healthcare provider may recommend relaxation techniques and deep breathing exercises to help decrease your stress  Your healthcare provider may recommend you talk to someone about your stress or anxiety, such as a counselor or a trusted friend  Get plenty of sleep and exercise regularly  · Limit or do not drink alcohol  Alcohol can make your abdominal pain worse   Ask your healthcare provider if it is safe for you to drink alcohol  Also ask how much is safe for you to drink  · Do not smoke  Nicotine and other chemicals in cigarettes can damage your esophagus and stomach  Ask your healthcare provider for information if you currently smoke and need help to quit  E-cigarettes or smokeless tobacco still contain nicotine  Talk to your healthcare provider before you use these products  Make changes to the food you eat as directed:  Do not eat foods that cause abdominal pain or other symptoms  Eat small meals more often  · Eat more high-fiber foods if you are constipated  High-fiber foods include fruits, vegetables, whole-grain foods, and legumes  · Do not eat foods that cause gas if you have bloating  Examples include broccoli, cabbage, and cauliflower  Do not drink soda or carbonated drinks, because these may also cause gas  · Do not eat foods or drinks that contain sorbitol or fructose if you have diarrhea and bloating  Some examples are fruit juices, candy, jelly, and sugar-free gum  · Do not eat high-fat foods, such as fried foods, cheeseburgers, hot dogs, and desserts  · Limit or do not drink caffeine  Caffeine may make symptoms, such as heart burn or nausea, worse  · Drink plenty of liquids to prevent dehydration from diarrhea or vomiting  Ask your healthcare provider how much liquid to drink each day and which liquids are best for you  Follow up with your healthcare provider as directed:  Write down your questions so you remember to ask them during your visits  © 2017 [address] Information is for End User's use only and may not be sold, redistributed or otherwise used for commercial purposes  All illustrations and images included in CareNotes® are the copyrighted property of [employer]  or [employer]  The above information is an  only  It is not intended as medical advice for individual conditions or treatments   Talk to your doctor, nurse or pharmacist before following any medical regimen to see if it is safe and effective for you  Ventral Hernia   WHAT YOU SHOULD KNOW:   A ventral hernia is a bulge through an abnormal opening in the wall of your abdominal muscles  The bulge is often part of your intestine, but it may also be tissue or fat  There are three common types of ventral hernias  An incisional hernia occurs where you have had a surgical cut  An epigastric hernia occurs above your belly button  A spigelian hernia occurs on the side of your abdomen  AFTER YOU LEAVE:   Medicines:   · NSAIDs  help decrease swelling and pain or fever  This medicine is available with or without a doctor's order  NSAIDs can cause stomach bleeding or kidney problems in certain people  If you take blood thinner medicine, always ask your healthcare provider if NSAIDs are safe for you  Always read the medicine label and follow directions  · Take your medicine as directed  Call your healthcare provider if you think your medicine is not helping or if you have side effects  Tell him if you are allergic to any medicine  Keep a list of the medicines, vitamins, and herbs you take  Include the amounts, and when and why you take them  Bring the list or the pill bottles to follow-up visits  Carry your medicine list with you in case of an emergency  Follow up with your healthcare provider as directed: Your healthcare provider may need to check to make sure your ventral hernia has not returned  Write down your questions so you remember to ask them during your visits  Prevent another ventral hernia:   · Do not lift heavy objects until your healthcare provider says it is okay  · You may need to wear a support belt or girdle  Ask for more information about support clothing  · Your healthcare provider may suggest that you do exercises to strengthen your abdomen  Swimming and walking may also help  Ask your healthcare provider for an exercise plan   Do not exercise more than your healthcare provider says is okay  · Ask your healthcare provider when it is okay to return to your normal daily activities  · Follow your healthcare provider's advice about losing weight  If you are overweight, ask for a diet plan that is right for you  · Avoid straining when you cough, urinate, or have a bowel movement  Ask your healthcare provider for ways to avoid straining  Contact your healthcare provider if:   · You have vomited  · You are urinating very little or not at all  · You are constipated  · You have swelling, bleeding, or pus near your wound  · You have questions or concerns about your hernia, treatment, or care  Seek care immediately or call 911 if:   · You have a fever  · Your abdomen is suddenly very hard  · You have pain in your abdomen or back that does not go away, even after you take pain medicine  · You have bleeding from your wound that does not stop  · You have sudden difficulty breathing  © 2014 [address] is for End User's use only and may not be sold, redistributed or otherwise used for commercial purposes  All illustrations and images included in CareNotes® are the copyrighted property of [employer]  or Braden Trevino  The above information is an  only  It is not intended as medical advice for individual conditions or treatments  Talk to your doctor, nurse or pharmacist before following any medical regimen to see if it is safe and effective for you  Knee Sprain   WHAT YOU NEED TO KNOW:   A knee sprain occurs when one or more ligaments in your knee are suddenly stretched or torn  Ligaments are tissues that hold bones together  Ligaments support the knee and keep the joint and bones in the correct position          DISCHARGE INSTRUCTIONS:   Seek care immediately if:   · Any part of your leg feels cold, numb, or looks pale     Contact your healthcare provider if:   · You have new or increased swelling, bruising, or pain in your knee  · Your symptoms do not improve within 6 weeks, even with treatment  · You have questions or concerns about your condition or care  Medicines:   · NSAIDs , such as ibuprofen, help decrease swelling, pain, and fever  This medicine is available with or without a doctor's order  NSAIDs can cause stomach bleeding or kidney problems in certain people  If you take blood thinner medicine, always ask your healthcare provider if NSAIDs are safe for you  Always read the medicine label and follow directions  · Acetaminophen  decreases pain and fever  It is available without a doctor's order  Ask how much to take and how often to take it  Follow directions  Read the labels of all other medicines you are using to see if they also contain acetaminophen, or ask your doctor or pharmacist  Acetaminophen can cause liver damage if not taken correctly  Do not use more than 4 grams (4,000 milligrams) total of acetaminophen in one day  · Prescription pain medicine  may be given  Ask how to take this medicine safely  · Take your medicine as directed  Contact your healthcare provider if you think your medicine is not helping or if you have side effects  Tell him or her if you are allergic to any medicine  Keep a list of the medicines, vitamins, and herbs you take  Include the amounts, and when and why you take them  Bring the list or the pill bottles to follow-up visits  Carry your medicine list with you in case of an emergency  Self-care:   · Rest  your knee and do not exercise  You may be told to keep weight off your knee  This means that you should not walk on your injured leg  Rest helps decrease swelling and allows the injury to heal  You can do gentle range of motion (ROM) exercises as directed  This will prevent stiffness  · Apply ice  on your knee for 15 to 20 minutes every hour or as directed  Use an ice pack, or put crushed ice in a plastic bag   Cover it with a towel  Ice helps prevent tissue damage and decreases swelling and pain  · Apply compression to your knee as directed  You may need to wear an elastic bandage  This helps keep your injured knee from moving too much while it heals  You can loosen or tighten the elastic bandage to make it comfortable  It should be tight enough for you to feel support  It should not be so tight that it causes your toes to feel numb or tingly  If you are wearing an elastic bandage, take it off and rewrap it once a day  · Elevate your knee  above the level of your heart as often as you can  This will help decrease swelling and pain  Prop your leg on pillows or blankets to keep it elevated comfortably  Do not put pillows directly behind your knee  · Use support devices as directed:  Support devices such as a splint or brace may be needed  These devices limit movement and protect your joint while it heals  You may be given crutches to use until you can stand on your injured leg without pain  Use devices as directed  Physical therapy:  A physical therapist teaches you exercises to help improve movement and strength, and to decrease pain  Prevent another knee sprain:  Exercise your legs to keep your muscles strong  Strong leg muscles help protect your knee and prevent strain  The following may also prevent a knee sprain:  · Slowly start your exercise or training program   Slowly increase the time, distance, and intensity of your exercise  Sudden increases in training may cause you to injure your knee again  · Wear protective braces and equipment as directed  Braces may prevent your knee from moving the wrong way and causing another sprain  Protective equipment may support your bones and ligaments to prevent injury  · Warm up and stretch before exercise  Warm up by walking or using an exercise bike before starting your regular exercise  Do gentle stretches after warming up   This helps to loosen your muscles and decrease stress on your knee  Cool down and stretch after you exercise  · Wear shoes that fit correctly and support your feet  Replace your running or exercise shoes before the padding or shock absorption is worn out  Ask your healthcare provider which exercise shoes are best for you  Ask if you should wear special shoe inserts  Shoe inserts can help support your heels and arches or keep your foot lined up correctly in your shoes  Exercise on flat surfaces  Follow up with your healthcare provider as directed:  Write down your questions so you remember to ask them during your visits  © 2017 [address] [facility] Information is for End User's use only and may not be sold, redistributed or otherwise used for commercial purposes  All illustrations and images included in CareNotes® are the copyrighted property of A D [employer] M [employer] Inc  or Braden Trevino  The above information is an  only  It is not intended as medical advice for individual conditions or treatments  Talk to your doctor, nurse or pharmacist before following any medical regimen to see if it is safe and effective for you  Knee Immobilizer   WHAT YOU NEED TO KNOW:   A knee immobilizer limits knee movement  It is used after an injury or surgery to help your knee, muscles, or tendons heal    DISCHARGE INSTRUCTIONS:   How to safely use a knee immobilizer:   · Have your knee immobilizer fitted by your healthcare provider  It is important that your knee immobilizer is the right size for you and that it fits properly  · Wear your knee immobilizer as directed  It can be worn over your clothing  Check the fit of the knee immobilizer often  If it does not fit properly or slips out of place, it could cause further injury  · Use crutches as directed  You may need to avoid putting weight on your injured leg  Your healthcare provider will tell you if you need crutches and for how long       · Inspect your knee immobilizer often  Do not wear your knee immobilizer if it is damaged or broken  You may need to replace it if it becomes worn  · Ask your healthcare provider how to care for your knee immobilizer  You may be able to hand wash the fabric with mild soap and water  Do not place it in the washer or dryer  · Go to physical therapy as directed  A physical therapist can help you strengthen the muscles in your leg and help your knee heal   Contact your healthcare provider if:   · Your knee pain becomes worse when you wear your knee immobilizer  · Your skin is sore or raw after you wear your knee immobilizer  · Your leg feels numb or swells while you wear your knee immobilizer  · Your knee immobilizer is damaged  · You have questions or concerns about your condition or care  Seek care immediately or call 911 if:   · You have severe swelling or pain in your leg or knee  © 2017 [address] Information is for End User's use only and may not be sold, redistributed or otherwise used for commercial purposes  All illustrations and images included in CareNotes® are the copyrighted property of [employer]  or Braden Trevino  The above information is an  only  It is not intended as medical advice for individual conditions or treatments  Talk to your doctor, nurse or pharmacist before following any medical regimen to see if it is safe and effective for you

## 2020-04-22 ENCOUNTER — OFFICE VISIT (OUTPATIENT)
Dept: OBGYN CLINIC | Facility: CLINIC | Age: 45
End: 2020-04-22

## 2020-04-22 VITALS
DIASTOLIC BLOOD PRESSURE: 101 MMHG | SYSTOLIC BLOOD PRESSURE: 149 MMHG | HEIGHT: 74 IN | WEIGHT: 238 LBS | HEART RATE: 98 BPM | BODY MASS INDEX: 30.54 KG/M2 | RESPIRATION RATE: 20 BRPM

## 2020-04-22 DIAGNOSIS — M23.91 INTERNAL DERANGEMENT OF RIGHT KNEE: ICD-10-CM

## 2020-04-22 DIAGNOSIS — M25.561 RIGHT KNEE PAIN, UNSPECIFIED CHRONICITY: Primary | ICD-10-CM

## 2020-04-22 PROCEDURE — 99213 OFFICE O/P EST LOW 20 MIN: CPT | Performed by: ORTHOPAEDIC SURGERY

## 2020-04-22 RX ORDER — NAPROXEN 375 MG/1
375 TABLET, DELAYED RELEASE ORAL 2 TIMES DAILY WITH MEALS
Qty: 60 EACH | Refills: 1 | Status: ON HOLD | OUTPATIENT
Start: 2020-04-22 | End: 2020-05-15 | Stop reason: ALTCHOICE

## 2020-04-22 RX ORDER — GABAPENTIN 100 MG/1
100 CAPSULE ORAL 3 TIMES DAILY
Qty: 90 CAPSULE | Refills: 2 | Status: ON HOLD | OUTPATIENT
Start: 2020-04-22 | End: 2020-05-15 | Stop reason: ALTCHOICE

## 2020-04-28 ENCOUNTER — HOSPITAL ENCOUNTER (OUTPATIENT)
Dept: MRI IMAGING | Facility: HOSPITAL | Age: 45
Discharge: HOME | End: 2020-04-28
Payer: COMMERCIAL

## 2020-04-28 DIAGNOSIS — M25.561 RIGHT KNEE PAIN, UNSPECIFIED CHRONICITY: ICD-10-CM

## 2020-04-28 PROCEDURE — 73721 MRI JNT OF LWR EXTRE W/O DYE: CPT

## 2020-05-05 ENCOUNTER — OFFICE VISIT (OUTPATIENT)
Dept: OBGYN CLINIC | Facility: CLINIC | Age: 45
End: 2020-05-05

## 2020-05-05 VITALS
BODY MASS INDEX: 30.8 KG/M2 | HEART RATE: 101 BPM | DIASTOLIC BLOOD PRESSURE: 93 MMHG | WEIGHT: 240 LBS | SYSTOLIC BLOOD PRESSURE: 138 MMHG | RESPIRATION RATE: 20 BRPM | HEIGHT: 74 IN

## 2020-05-05 DIAGNOSIS — S80.01XD CONTUSION OF RIGHT KNEE, SUBSEQUENT ENCOUNTER: ICD-10-CM

## 2020-05-05 DIAGNOSIS — S83.241D ACUTE MEDIAL MENISCUS TEAR OF RIGHT KNEE, SUBSEQUENT ENCOUNTER: Primary | ICD-10-CM

## 2020-05-05 PROCEDURE — 99214 OFFICE O/P EST MOD 30 MIN: CPT | Performed by: ORTHOPAEDIC SURGERY

## 2020-05-06 DIAGNOSIS — Z01.818 ENCOUNTER FOR PREADMISSION TESTING: Primary | ICD-10-CM

## 2020-05-08 DIAGNOSIS — Z01.818 ENCOUNTER FOR PREADMISSION TESTING: ICD-10-CM

## 2020-05-08 PROCEDURE — U0003 INFECTIOUS AGENT DETECTION BY NUCLEIC ACID (DNA OR RNA); SEVERE ACUTE RESPIRATORY SYNDROME CORONAVIRUS 2 (SARS-COV-2) (CORONAVIRUS DISEASE [COVID-19]), AMPLIFIED PROBE TECHNIQUE, MAKING USE OF HIGH THROUGHPUT TECHNOLOGIES AS DESCRIBED BY CMS-2020-01-R: HCPCS

## 2020-05-09 LAB — SARS-COV-2 RNA SPEC QL NAA+PROBE: NOT DETECTED

## 2020-05-14 ENCOUNTER — ANESTHESIA EVENT (OUTPATIENT)
Dept: PERIOP | Facility: HOSPITAL | Age: 45
End: 2020-05-14

## 2020-05-15 ENCOUNTER — HOSPITAL ENCOUNTER (OUTPATIENT)
Facility: HOSPITAL | Age: 45
Setting detail: OUTPATIENT SURGERY
Discharge: HOME | End: 2020-05-15
Attending: ORTHOPAEDIC SURGERY | Admitting: ORTHOPAEDIC SURGERY

## 2020-05-15 ENCOUNTER — ANESTHESIA (OUTPATIENT)
Dept: PERIOP | Facility: HOSPITAL | Age: 45
End: 2020-05-15

## 2020-05-15 VITALS
RESPIRATION RATE: 18 BRPM | SYSTOLIC BLOOD PRESSURE: 129 MMHG | WEIGHT: 241.62 LBS | HEIGHT: 74 IN | DIASTOLIC BLOOD PRESSURE: 72 MMHG | TEMPERATURE: 97.2 F | BODY MASS INDEX: 31.01 KG/M2 | HEART RATE: 98 BPM | OXYGEN SATURATION: 94 %

## 2020-05-15 DIAGNOSIS — S83.241A ACUTE MEDIAL MENISCUS TEAR OF RIGHT KNEE, INITIAL ENCOUNTER: ICD-10-CM

## 2020-05-15 DIAGNOSIS — Z98.890 S/P RIGHT KNEE ARTHROSCOPY: Primary | ICD-10-CM

## 2020-05-15 PROCEDURE — 29881 ARTHRS KNE SRG MNISECTMY M/L: CPT | Performed by: ORTHOPAEDIC SURGERY

## 2020-05-15 RX ORDER — KETAMINE HCL IN NACL, ISO-OSM 100MG/10ML
SYRINGE (ML) INJECTION AS NEEDED
Status: DISCONTINUED | OUTPATIENT
Start: 2020-05-15 | End: 2020-05-15 | Stop reason: SURG

## 2020-05-15 RX ORDER — METHYLPREDNISOLONE ACETATE 80 MG/ML
INJECTION, SUSPENSION INTRA-ARTICULAR; INTRALESIONAL; INTRAMUSCULAR; SOFT TISSUE AS NEEDED
Status: DISCONTINUED | OUTPATIENT
Start: 2020-05-15 | End: 2020-05-15 | Stop reason: HOSPADM

## 2020-05-15 RX ORDER — PROPOFOL 10 MG/ML
INJECTION, EMULSION INTRAVENOUS AS NEEDED
Status: DISCONTINUED | OUTPATIENT
Start: 2020-05-15 | End: 2020-05-15 | Stop reason: SURG

## 2020-05-15 RX ORDER — FENTANYL CITRATE 50 UG/ML
INJECTION, SOLUTION INTRAMUSCULAR; INTRAVENOUS AS NEEDED
Status: DISCONTINUED | OUTPATIENT
Start: 2020-05-15 | End: 2020-05-15 | Stop reason: SURG

## 2020-05-15 RX ORDER — EPHEDRINE SULFATE 50 MG/ML
INJECTION INTRAVENOUS AS NEEDED
Status: DISCONTINUED | OUTPATIENT
Start: 2020-05-15 | End: 2020-05-15 | Stop reason: SURG

## 2020-05-15 RX ORDER — DEXMEDETOMIDINE HYDROCHLORIDE 100 UG/ML
INJECTION, SOLUTION INTRAVENOUS AS NEEDED
Status: DISCONTINUED | OUTPATIENT
Start: 2020-05-15 | End: 2020-05-15 | Stop reason: SURG

## 2020-05-15 RX ORDER — OXYCODONE HYDROCHLORIDE 5 MG/1
5 TABLET ORAL EVERY 4 HOURS PRN
Qty: 30 TABLET | Refills: 0 | Status: SHIPPED | OUTPATIENT
Start: 2020-05-15 | End: 2020-05-25

## 2020-05-15 RX ORDER — DIPHENHYDRAMINE HYDROCHLORIDE 50 MG/ML
12.5 INJECTION INTRAMUSCULAR; INTRAVENOUS ONCE AS NEEDED
Status: DISCONTINUED | OUTPATIENT
Start: 2020-05-15 | End: 2020-05-15 | Stop reason: HOSPADM

## 2020-05-15 RX ORDER — CEFAZOLIN SODIUM 2 G/50ML
2000 SOLUTION INTRAVENOUS ONCE
Status: COMPLETED | OUTPATIENT
Start: 2020-05-15 | End: 2020-05-15

## 2020-05-15 RX ORDER — MIDAZOLAM HYDROCHLORIDE 2 MG/2ML
INJECTION, SOLUTION INTRAMUSCULAR; INTRAVENOUS AS NEEDED
Status: DISCONTINUED | OUTPATIENT
Start: 2020-05-15 | End: 2020-05-15 | Stop reason: SURG

## 2020-05-15 RX ORDER — METOCLOPRAMIDE HYDROCHLORIDE 5 MG/ML
10 INJECTION INTRAMUSCULAR; INTRAVENOUS ONCE AS NEEDED
Status: DISCONTINUED | OUTPATIENT
Start: 2020-05-15 | End: 2020-05-15 | Stop reason: HOSPADM

## 2020-05-15 RX ORDER — DEXAMETHASONE SODIUM PHOSPHATE 10 MG/ML
INJECTION, SOLUTION INTRAMUSCULAR; INTRAVENOUS AS NEEDED
Status: DISCONTINUED | OUTPATIENT
Start: 2020-05-15 | End: 2020-05-15 | Stop reason: SURG

## 2020-05-15 RX ORDER — ONDANSETRON 2 MG/ML
4 INJECTION INTRAMUSCULAR; INTRAVENOUS ONCE AS NEEDED
Status: DISCONTINUED | OUTPATIENT
Start: 2020-05-15 | End: 2020-05-15 | Stop reason: HOSPADM

## 2020-05-15 RX ORDER — MELOXICAM 15 MG/1
15 TABLET ORAL DAILY
Qty: 30 TABLET | Refills: 1 | Status: SHIPPED | OUTPATIENT
Start: 2020-05-15 | End: 2022-06-30

## 2020-05-15 RX ORDER — LIDOCAINE HYDROCHLORIDE 10 MG/ML
0.5 INJECTION, SOLUTION EPIDURAL; INFILTRATION; INTRACAUDAL; PERINEURAL ONCE AS NEEDED
Status: DISCONTINUED | OUTPATIENT
Start: 2020-05-15 | End: 2020-05-15 | Stop reason: HOSPADM

## 2020-05-15 RX ORDER — SENNOSIDES 8.6 MG
650 CAPSULE ORAL EVERY 8 HOURS PRN
Qty: 30 TABLET | Refills: 0 | Status: SHIPPED | OUTPATIENT
Start: 2020-05-15 | End: 2022-06-30

## 2020-05-15 RX ORDER — GABAPENTIN 300 MG/1
300 CAPSULE ORAL 3 TIMES DAILY
Qty: 50 CAPSULE | Refills: 1 | Status: SHIPPED | OUTPATIENT
Start: 2020-05-15 | End: 2022-06-30

## 2020-05-15 RX ORDER — BUPIVACAINE HYDROCHLORIDE 5 MG/ML
INJECTION, SOLUTION EPIDURAL; INTRACAUDAL AS NEEDED
Status: DISCONTINUED | OUTPATIENT
Start: 2020-05-15 | End: 2020-05-15 | Stop reason: HOSPADM

## 2020-05-15 RX ORDER — SODIUM CHLORIDE, SODIUM LACTATE, POTASSIUM CHLORIDE, CALCIUM CHLORIDE 600; 310; 30; 20 MG/100ML; MG/100ML; MG/100ML; MG/100ML
125 INJECTION, SOLUTION INTRAVENOUS CONTINUOUS
Status: DISCONTINUED | OUTPATIENT
Start: 2020-05-15 | End: 2020-05-15 | Stop reason: HOSPADM

## 2020-05-15 RX ORDER — ONDANSETRON 2 MG/ML
INJECTION INTRAMUSCULAR; INTRAVENOUS AS NEEDED
Status: DISCONTINUED | OUTPATIENT
Start: 2020-05-15 | End: 2020-05-15 | Stop reason: SURG

## 2020-05-15 RX ORDER — OXYCODONE HYDROCHLORIDE AND ACETAMINOPHEN 5; 325 MG/1; MG/1
2 TABLET ORAL EVERY 4 HOURS PRN
Status: DISCONTINUED | OUTPATIENT
Start: 2020-05-15 | End: 2020-05-15 | Stop reason: HOSPADM

## 2020-05-15 RX ORDER — HYDROMORPHONE HCL/PF 1 MG/ML
0.5 SYRINGE (ML) INJECTION
Status: DISCONTINUED | OUTPATIENT
Start: 2020-05-15 | End: 2020-05-15 | Stop reason: HOSPADM

## 2020-05-15 RX ORDER — SUCCINYLCHOLINE/SOD CL,ISO/PF 100 MG/5ML
SYRINGE (ML) INTRAVENOUS AS NEEDED
Status: DISCONTINUED | OUTPATIENT
Start: 2020-05-15 | End: 2020-05-15 | Stop reason: SURG

## 2020-05-15 RX ORDER — FENTANYL CITRATE/PF 50 MCG/ML
50 SYRINGE (ML) INJECTION
Status: COMPLETED | OUTPATIENT
Start: 2020-05-15 | End: 2020-05-15

## 2020-05-15 RX ORDER — HYDROMORPHONE HCL/PF 1 MG/ML
0.2 SYRINGE (ML) INJECTION
Status: COMPLETED | OUTPATIENT
Start: 2020-05-15 | End: 2020-05-15

## 2020-05-15 RX ORDER — CEPHALEXIN 500 MG/1
500 CAPSULE ORAL 4 TIMES DAILY
Qty: 4 CAPSULE | Refills: 0 | Status: SHIPPED | OUTPATIENT
Start: 2020-05-15 | End: 2020-05-16

## 2020-05-15 RX ADMIN — HYDROMORPHONE HYDROCHLORIDE 0.2 MG: 1 INJECTION, SOLUTION INTRAMUSCULAR; INTRAVENOUS; SUBCUTANEOUS at 14:30

## 2020-05-15 RX ADMIN — PHENYLEPHRINE HYDROCHLORIDE 200 MCG: 10 INJECTION INTRAVENOUS at 12:17

## 2020-05-15 RX ADMIN — SODIUM CHLORIDE, SODIUM LACTATE, POTASSIUM CHLORIDE, AND CALCIUM CHLORIDE: .6; .31; .03; .02 INJECTION, SOLUTION INTRAVENOUS at 11:30

## 2020-05-15 RX ADMIN — HYDROMORPHONE HYDROCHLORIDE 0.5 MG: 1 INJECTION, SOLUTION INTRAMUSCULAR; INTRAVENOUS; SUBCUTANEOUS at 14:49

## 2020-05-15 RX ADMIN — HYDROMORPHONE HYDROCHLORIDE 0.2 MG: 1 INJECTION, SOLUTION INTRAMUSCULAR; INTRAVENOUS; SUBCUTANEOUS at 14:13

## 2020-05-15 RX ADMIN — EPHEDRINE SULFATE 10 MG: 50 INJECTION INTRAVENOUS at 12:35

## 2020-05-15 RX ADMIN — PHENYLEPHRINE HYDROCHLORIDE 200 MCG: 10 INJECTION INTRAVENOUS at 12:35

## 2020-05-15 RX ADMIN — FENTANYL CITRATE 50 MCG: 50 INJECTION, SOLUTION INTRAMUSCULAR; INTRAVENOUS at 12:06

## 2020-05-15 RX ADMIN — PHENYLEPHRINE HYDROCHLORIDE 200 MCG: 10 INJECTION INTRAVENOUS at 12:11

## 2020-05-15 RX ADMIN — ONDANSETRON 4 MG: 2 INJECTION INTRAMUSCULAR; INTRAVENOUS at 12:06

## 2020-05-15 RX ADMIN — HYDROMORPHONE HYDROCHLORIDE 0.2 MG: 1 INJECTION, SOLUTION INTRAMUSCULAR; INTRAVENOUS; SUBCUTANEOUS at 14:19

## 2020-05-15 RX ADMIN — DEXAMETHASONE SODIUM PHOSPHATE 10 MG: 10 INJECTION, SOLUTION INTRAMUSCULAR; INTRAVENOUS at 12:06

## 2020-05-15 RX ADMIN — DEXMEDETOMIDINE HCL 8 MCG: 100 INJECTION INTRAVENOUS at 13:05

## 2020-05-15 RX ADMIN — FENTANYL CITRATE 50 MCG: 50 INJECTION INTRAMUSCULAR; INTRAVENOUS at 13:56

## 2020-05-15 RX ADMIN — DEXMEDETOMIDINE HCL 8 MCG: 100 INJECTION INTRAVENOUS at 12:24

## 2020-05-15 RX ADMIN — FENTANYL CITRATE 50 MCG: 50 INJECTION, SOLUTION INTRAMUSCULAR; INTRAVENOUS at 12:29

## 2020-05-15 RX ADMIN — SODIUM CHLORIDE, SODIUM LACTATE, POTASSIUM CHLORIDE, AND CALCIUM CHLORIDE: .6; .31; .03; .02 INJECTION, SOLUTION INTRAVENOUS at 12:53

## 2020-05-15 RX ADMIN — DEXMEDETOMIDINE HCL 4 MCG: 100 INJECTION INTRAVENOUS at 13:35

## 2020-05-15 RX ADMIN — Medication 100 MG: at 12:06

## 2020-05-15 RX ADMIN — FENTANYL CITRATE 50 MCG: 50 INJECTION, SOLUTION INTRAMUSCULAR; INTRAVENOUS at 12:39

## 2020-05-15 RX ADMIN — PHENYLEPHRINE HYDROCHLORIDE 300 MCG: 10 INJECTION INTRAVENOUS at 12:15

## 2020-05-15 RX ADMIN — MIDAZOLAM HYDROCHLORIDE 2 MG: 1 INJECTION, SOLUTION INTRAMUSCULAR; INTRAVENOUS at 11:59

## 2020-05-15 RX ADMIN — OXYCODONE HYDROCHLORIDE AND ACETAMINOPHEN 2 TABLET: 5; 325 TABLET ORAL at 15:18

## 2020-05-15 RX ADMIN — Medication 10 MG: at 12:47

## 2020-05-15 RX ADMIN — Medication 10 MG: at 13:36

## 2020-05-15 RX ADMIN — FENTANYL CITRATE 50 MCG: 50 INJECTION, SOLUTION INTRAMUSCULAR; INTRAVENOUS at 12:03

## 2020-05-15 RX ADMIN — CEFAZOLIN SODIUM 2000 MG: 2 SOLUTION INTRAVENOUS at 12:03

## 2020-05-15 RX ADMIN — Medication 30 MG: at 12:31

## 2020-05-15 RX ADMIN — PROPOFOL 200 MG: 10 INJECTION, EMULSION INTRAVENOUS at 12:06

## 2020-05-15 RX ADMIN — FENTANYL CITRATE 50 MCG: 50 INJECTION INTRAMUSCULAR; INTRAVENOUS at 14:03

## 2020-05-15 RX ADMIN — FENTANYL CITRATE 50 MCG: 50 INJECTION INTRAMUSCULAR; INTRAVENOUS at 13:50

## 2020-05-22 ENCOUNTER — OFFICE VISIT (OUTPATIENT)
Dept: OBGYN CLINIC | Facility: CLINIC | Age: 45
End: 2020-05-22

## 2020-05-22 VITALS
DIASTOLIC BLOOD PRESSURE: 83 MMHG | SYSTOLIC BLOOD PRESSURE: 131 MMHG | HEIGHT: 74 IN | BODY MASS INDEX: 30.93 KG/M2 | WEIGHT: 241 LBS | HEART RATE: 103 BPM

## 2020-05-22 DIAGNOSIS — M25.561 PAIN OF MENISCUS OF RIGHT KNEE: Primary | ICD-10-CM

## 2020-05-22 PROCEDURE — 99024 POSTOP FOLLOW-UP VISIT: CPT | Performed by: ORTHOPAEDIC SURGERY

## 2020-05-26 ENCOUNTER — TELEPHONE (OUTPATIENT)
Dept: OBGYN CLINIC | Facility: HOSPITAL | Age: 45
End: 2020-05-26

## 2020-05-27 ENCOUNTER — OFFICE VISIT (OUTPATIENT)
Dept: OBGYN CLINIC | Facility: CLINIC | Age: 45
End: 2020-05-27

## 2020-05-27 VITALS
DIASTOLIC BLOOD PRESSURE: 83 MMHG | HEIGHT: 74 IN | RESPIRATION RATE: 18 BRPM | BODY MASS INDEX: 31.57 KG/M2 | WEIGHT: 246 LBS | HEART RATE: 90 BPM | SYSTOLIC BLOOD PRESSURE: 127 MMHG

## 2020-05-27 DIAGNOSIS — Z98.890 STATUS POST ARTHROSCOPIC PARTIAL MEDIAL MENISCECTOMY: Primary | ICD-10-CM

## 2020-05-27 PROCEDURE — 99024 POSTOP FOLLOW-UP VISIT: CPT | Performed by: ORTHOPAEDIC SURGERY

## 2020-06-30 ENCOUNTER — OFFICE VISIT (OUTPATIENT)
Dept: OBGYN CLINIC | Facility: CLINIC | Age: 45
End: 2020-06-30

## 2020-06-30 VITALS
WEIGHT: 242 LBS | BODY MASS INDEX: 31.06 KG/M2 | RESPIRATION RATE: 18 BRPM | DIASTOLIC BLOOD PRESSURE: 81 MMHG | HEART RATE: 101 BPM | SYSTOLIC BLOOD PRESSURE: 145 MMHG | HEIGHT: 74 IN

## 2020-06-30 DIAGNOSIS — Z98.890 STATUS POST ARTHROSCOPIC PARTIAL MEDIAL MENISCECTOMY: Primary | ICD-10-CM

## 2020-06-30 PROCEDURE — 99024 POSTOP FOLLOW-UP VISIT: CPT | Performed by: ORTHOPAEDIC SURGERY

## 2020-06-30 NOTE — LETTER
June 30, 2020     Patient: Candy Landis   YOB: 1975   Date of Visit: 6/30/2020       To Whom it May Concern:    Candy Landis is under my professional care  He was seen in my office on 6/30/2020  He may return to work on July 6, 2020 with no restrictions  If you have any questions or concerns, please don't hesitate to call           Sincerely,          Tasha Sanchez MD        CC: No Recipients

## 2020-06-30 NOTE — PROGRESS NOTES
CHIEF COMPLAINT:   Chief Complaint   Patient presents with    Right Knee - Post-op         PROCEDURE: S/P right knee partial medial meniscectomy May 15, 2020      SUBJECTIVE: Herbert Connor is a 39 y o  male is here for follow up  Patient feeling much better today  Still attending physical therapy working on strengthening  Has slight discomfort at times going down stairs only  Patient denies any numbness or tingling right lower extremity  Patient ready to return to work full duty on Monday      ROS:  Review of systems form reviewed June 30, 2020  General: no fever, no chills  Respiratory:  No coughing, shortness of breath or wheezing  Cardiovascular:  No chest pain, no palpitations  Neurological:  No headaches, no confusion  Review of all other systems is negative    MEDS:   Current Outpatient Medications   Medication Sig Dispense Refill    acetaminophen (TYLENOL) 650 mg CR tablet Take 1 tablet (650 mg total) by mouth every 8 (eight) hours as needed for mild pain 30 tablet 0    gabapentin (NEURONTIN) 300 mg capsule Take 1 capsule (300 mg total) by mouth 3 (three) times a day 50 capsule 1    meloxicam (MOBIC) 15 mg tablet Take 1 tablet (15 mg total) by mouth daily 30 tablet 1     No current facility-administered medications for this visit  ALLERGIES: Patient has no known allergies        Vitals:    06/30/20 1528   BP: 145/81   Pulse: 101   Resp: 18   Weight: 110 kg (242 lb)   Height: 6' 2" (1 88 m)       PHYSICAL EXAM:    General Appearance:  Alert, cooperative, no distress, appears stated age   Lungs:   respirations unlabored   Chest Wall:  No tenderness or deformity   Heart:  Normal Heart rate noted   Extremities: Extremities normal, atraumatic, no cyanosis or edema   Pulses: 2+ and symmetric   Neurologic: Normal     ORTHO EXAM:  Examination right knee shows well-healed portal sites  No erythema, no ecchymosis no signs of infection  Patient with full active range of motion with no effusion  Sensation is intact light touch right lower extremity  Calf soft and nontender      ASSESSMENT/PLAN:   S/p 7 weeks above procedure  Nathan Avilez was seen today for post-op  Diagnoses and all orders for this visit:    Status post arthroscopic partial medial meniscectomy        There are no Patient Instructions on file for this visit  DISCUSSION SUMMARY:  Patient is S/P 7 weeks right knee partial medial meniscectomy  Patient doing excellent  Will continue with physical therapy working on strengthening  Patient will return to work full duty this Monday with no restrictions  Patient may resume all activities with no restrictions    He will follow up with us as needed    Scribe Attestation    I,:   Anne Bradley PA-C am acting as a scribe while in the presence of the attending physician :        I,:   Kenisha Rutherford MD personally performed the services described in this documentation    as scribed in my presence :

## 2021-03-09 ENCOUNTER — APPOINTMENT (EMERGENCY)
Dept: RADIOLOGY | Facility: HOSPITAL | Age: 46
End: 2021-03-09

## 2021-03-09 ENCOUNTER — HOSPITAL ENCOUNTER (EMERGENCY)
Facility: HOSPITAL | Age: 46
Discharge: HOME | End: 2021-03-09
Attending: EMERGENCY MEDICINE | Admitting: EMERGENCY MEDICINE

## 2021-03-09 VITALS
SYSTOLIC BLOOD PRESSURE: 160 MMHG | RESPIRATION RATE: 18 BRPM | HEART RATE: 87 BPM | OXYGEN SATURATION: 100 % | TEMPERATURE: 98.5 F | HEIGHT: 74 IN | DIASTOLIC BLOOD PRESSURE: 67 MMHG | WEIGHT: 242 LBS | BODY MASS INDEX: 31.06 KG/M2

## 2021-03-09 DIAGNOSIS — M62.838 TRAPEZIUS MUSCLE SPASM: ICD-10-CM

## 2021-03-09 DIAGNOSIS — M25.511 ACUTE PAIN OF RIGHT SHOULDER: Primary | ICD-10-CM

## 2021-03-09 PROCEDURE — 96372 THER/PROPH/DIAG INJ SC/IM: CPT

## 2021-03-09 PROCEDURE — 99283 EMERGENCY DEPT VISIT LOW MDM: CPT

## 2021-03-09 PROCEDURE — 73030 X-RAY EXAM OF SHOULDER: CPT

## 2021-03-09 PROCEDURE — 99284 EMERGENCY DEPT VISIT MOD MDM: CPT | Performed by: EMERGENCY MEDICINE

## 2021-03-09 RX ORDER — KETOROLAC TROMETHAMINE 30 MG/ML
30 INJECTION, SOLUTION INTRAMUSCULAR; INTRAVENOUS ONCE
Status: COMPLETED | OUTPATIENT
Start: 2021-03-09 | End: 2021-03-09

## 2021-03-09 RX ORDER — NAPROXEN 375 MG/1
375 TABLET ORAL 2 TIMES DAILY WITH MEALS
Qty: 20 TABLET | Refills: 0 | Status: SHIPPED | OUTPATIENT
Start: 2021-03-09 | End: 2022-06-30

## 2021-03-09 RX ORDER — LIDOCAINE HYDROCHLORIDE 20 MG/ML
10 INJECTION, SOLUTION EPIDURAL; INFILTRATION; INTRACAUDAL; PERINEURAL ONCE
Status: COMPLETED | OUTPATIENT
Start: 2021-03-09 | End: 2021-03-09

## 2021-03-09 RX ADMIN — LIDOCAINE HYDROCHLORIDE 10 ML: 20 INJECTION, SOLUTION EPIDURAL; INFILTRATION; INTRACAUDAL; PERINEURAL at 11:37

## 2021-03-09 RX ADMIN — KETOROLAC TROMETHAMINE 30 MG: 30 INJECTION, SOLUTION INTRAMUSCULAR at 11:37

## 2021-03-09 NOTE — ED PROVIDER NOTES
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 when he had sudden pain in the right shoulder as well as associated feeling of a crunch  He states the pain is sharp, severe, indicates the posterior right shoulder, worse with moving the arm and better rest   He denies numbness, weakness, direct trauma, fevers, chest pain, shortness of breath, abdominal pain, nausea, vomiting, diarrhea, other symptoms  HPI      Past Medical and Surgical History    History reviewed  No pertinent past medical history  Past Surgical History:   Procedure Laterality Date    BACK SURGERY      HERNIA REPAIR      KNEE ARTHROSCOPY      VT KNEE SCOPE,MED OR LAT MENIS REPAIR Right 5/15/2020    Procedure: partial menisectomy;  Surgeon: Zahida Johnson MD;  Location: [facility];  Service: Orthopedics    VT REVISE ULNAR NERVE AT ELBOW Left 2/27/2019    Procedure: Quintenoria Timothy;  Surgeon: Lauryn Laguerre MD;  Location: [facility];  Service: Orthopedics    ROTATOR CUFF REPAIR      TONSILLECTOMY         Family History   Problem Relation Age of Onset    No Known Problems Mother     Allergies Father     Hypertension Father        Social History     Tobacco Use    Smoking status: Current Every Day Smoker     Packs/day: 1 00     Types: Cigarettes    Smokeless tobacco: Never Used   Substance Use Topics    Alcohol use: Yes     Frequency: Monthly or less    Drug use: No           Allergies    No Known Allergies    Home Medications    Prior to Admission medications    Medication Sig Start Date End Date Taking? Authorizing Provider   acetaminophen (TYLENOL) 650 mg CR tablet Take 1 tablet (650 mg total) by mouth every 8 (eight) hours as needed for mild pain 5/15/20   Rita Lomeli MD   gabapentin (NEURONTIN) 300 mg capsule Take 1 capsule (300 mg total) by mouth 3 (three) times a day 5/15/20   Rita Lomeli MD   meloxicam (MOBIC) 15 mg tablet Take 1 tablet (15 mg total) by mouth daily 5/15/20   Rita Lomeli MD           Review of Systems    Review of Systems   Constitutional: Negative for appetite change, chills and diaphoresis  HENT: Negative for drooling, facial swelling, trouble swallowing and voice change  Respiratory: Negative for apnea, shortness of breath and wheezing  Cardiovascular: Negative for chest pain and leg swelling  Gastrointestinal: Negative for abdominal distention, abdominal pain, diarrhea, nausea and vomiting  Genitourinary: Negative for dysuria and urgency  Musculoskeletal: Positive for arthralgias  Negative for back pain, gait problem and neck pain  Skin: Negative for color change, rash and wound  Neurological: Negative for seizures, speech difficulty, weakness and headaches  Psychiatric/Behavioral: Negative for agitation, behavioral problems and dysphoric mood  The patient is not nervous/anxious  All other systems reviewed and negative  Physical Exam      ED Triage Vitals [03/09/21 1117]   Temperature Pulse Respirations Blood Pressure SpO2   98 5 °F (36 9 °C) 102 16 169/82 100 %      Temp Source Heart Rate Source Patient Position - Orthostatic VS BP Location FiO2 (%)   Oral Monitor Sitting Left arm --      Pain Score       9               Physical Exam  Vitals signs and nursing note reviewed  Constitutional:       Appearance: He is well-developed  HENT:      Head: Normocephalic and atraumatic  Eyes:      Conjunctiva/sclera: Conjunctivae normal       Pupils: Pupils are equal, round, and reactive to light     Neck:      Musculoskeletal: Normal range of motion and neck supple  Trachea: No tracheal deviation  Cardiovascular:      Rate and Rhythm: Normal rate and regular rhythm  Heart sounds: Normal heart sounds  No murmur  Pulmonary:      Effort: Pulmonary effort is normal  No respiratory distress  Breath sounds: Normal breath sounds  No stridor  No wheezing or rales  Abdominal:      General: There is no distension  Palpations: Abdomen is soft  Tenderness: There is no abdominal tenderness  There is no guarding or rebound  Musculoskeletal: Normal range of motion  General: Tenderness present  No deformity  Comments: Patient tender to palpation overlying the rhomboid and trapezius of the right shoulder  No bony tenderness  Strength sensation pulse and cap refill intact distal   Pain reproduced with abduction of the shoulder   Skin:     General: Skin is warm and dry  Findings: No rash  Neurological:      Mental Status: He is alert and oriented to person, place, and time  Psychiatric:         Behavior: Behavior normal          Thought Content: Thought content normal          Judgment: Judgment normal               Diagnostic Results      Labs:    Results Reviewed     None          All labs reviewed and utilized in the medical decision making process    Radiology:    XR shoulder 2+ views RIGHT    (Results Pending)       All radiology studies independently viewed by me and interpreted by the radiologist     Procedure    Procedures    After discussion of risks and benefits, to include infection, failure to improve pain,  damage to deeper structures, bleeding, patient consented to a trigger point injection  Two Sites of maximum pain located by palpation, skin prepped with alcohol, and 2% lidocaine without epinephrine infiltrated into 2 sites of maximal pain at the right trapezius as well as the right rhomboid  No air or blood seen on continuous aspiration while inserting the needle    Procedure was tolerated well without complications  Patient reported mild relief of pain immediately, and substantial relief after approximately 10 min  ED Course of Care and Re-Assessments      Pain and range of motion minimally relieved with Toradol and substantially relieved with trigger point injection  Medications   ketorolac (TORADOL) injection 30 mg (30 mg Intramuscular Given 3/9/21 1137)   lidocaine (PF) (XYLOCAINE-MPF) 2 % injection 10 mL (10 mL Infiltration Given 3/9/21 1137)           FINAL IMPRESSION    Final diagnoses:   Acute pain of right shoulder   Trapezius muscle spasm         DISPOSITION/PLAN    Acute pain in the right shoulder felt to be secondary to muscle spasm as above  Vital signs reassuring with hypertension noted in the setting of acute pain, examination as above felt most consistent with musculoskeletal etiology the shoulder pain  No evidence of compartment syndrome, critical cord or nerve root impingement, unstable fracture dislocation, other acute threat to life or limb at this time  Certainly patient has evidence of spasm to the rhomboid and trapezius on the right, some concern for possible involvement of the rotator cuff, particularly the supraspinatus and patient counseled regarding this possibility  Discharged strict return precautions, follow up with Physical therapy and Orthopedics  Time reflects when diagnosis was documented in both MDM as applicable and the Disposition within this note     Time User Action Codes Description Comment    3/9/2021 12:18 PM Bernice GOLDEN Add [M25 511] Acute pain of right shoulder     3/9/2021 12:18 PM Alisa Oleary Add [X92 144] Trapezius muscle spasm       ED Disposition     ED Disposition Condition Date/Time Comment    Discharge Stable Tue Mar 9, 2021 12:18 PM Alisa Abdi 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              Follow-up Information     Follow up With Specialties Details Why Contact Info Additional [address] [facility] Emergency Department Emergency Medicine Go to  If symptoms worsen 34 [facility] [address] Emergency Department, [address] [facility], [facility], [address]    Khushboo Lopez MD  Call  As needed [facility] [address] [phone]       [address] [facility] Orthopedic Surgery Call in 1 day To schedule close outpatient follow-up for your shoulder injury [address] [facility] [address]  [address] [facility], [address] [address] [facility], [facility], [address]            PATIENT REFERRED TO:    [facility] Emergency Department  [address] [facility] [address] [phone]  Go to   If symptoms worsen    Khushboo Lopez MD  [address] [facility] [address] [facility] [address]  [phone]    Call   As needed    [address] [facility]  [address] [facility]  Call in 1 day  To schedule close outpatient follow-up for your shoulder injury      DISCHARGE MEDICATIONS:    Discharge Medication List as of 3/9/2021 12:19 PM      START taking these medications    Details   naproxen (NAPROSYN) 375 mg tablet Take 1 tablet (375 mg total) by mouth 2 (two) times a day with meals, Starting Tue 3/9/2021, Print         CONTINUE these medications which have NOT CHANGED    Details   acetaminophen (TYLENOL) 650 mg CR tablet Take 1 tablet (650 mg total) by mouth every 8 (eight) hours as needed for mild pain, Starting Fri 5/15/2020, Normal      gabapentin (NEURONTIN) 300 mg capsule Take 1 capsule (300 mg total) by mouth 3 (three) times a day, Starting Fri 5/15/2020, Normal      meloxicam (MOBIC) 15 mg tablet Take 1 tablet (15 mg total) by mouth daily, Starting Fri 5/15/2020, Normal             No discharge procedures on file          MD Brea Sharp MD  03/09/21 2693

## 2021-03-09 NOTE — Clinical Note
Haseeb Schwartz was seen and treated in our emergency department on 3/9/2021  Diagnosis: Right shoulder injury    Miguel Ángel Marin  may return to work on return date  He may return on this date: 03/12/2021         If you have any questions or concerns, please don't hesitate to call        Tevin Polk MD    ______________________________           _______________          _______________  Hospital Representative                              Date                                Time

## 2021-04-13 ENCOUNTER — APPOINTMENT (EMERGENCY)
Dept: ULTRASOUND IMAGING | Facility: HOSPITAL | Age: 46
End: 2021-04-13

## 2021-04-13 ENCOUNTER — APPOINTMENT (EMERGENCY)
Dept: CT IMAGING | Facility: HOSPITAL | Age: 46
End: 2021-04-13

## 2021-04-13 ENCOUNTER — HOSPITAL ENCOUNTER (EMERGENCY)
Facility: HOSPITAL | Age: 46
Discharge: HOME | End: 2021-04-13
Attending: EMERGENCY MEDICINE

## 2021-04-13 VITALS
DIASTOLIC BLOOD PRESSURE: 80 MMHG | RESPIRATION RATE: 18 BRPM | OXYGEN SATURATION: 95 % | WEIGHT: 240 LBS | SYSTOLIC BLOOD PRESSURE: 150 MMHG | HEIGHT: 75 IN | TEMPERATURE: 99.1 F | HEART RATE: 98 BPM | BODY MASS INDEX: 29.84 KG/M2

## 2021-04-13 DIAGNOSIS — R10.9 ABDOMINAL PAIN: Primary | ICD-10-CM

## 2021-04-13 LAB
ALBUMIN SERPL BCP-MCNC: 4.2 G/DL (ref 3.5–5)
ALP SERPL-CCNC: 108 U/L (ref 46–116)
ALT SERPL W P-5'-P-CCNC: 26 U/L (ref 12–78)
ANION GAP SERPL CALCULATED.3IONS-SCNC: 8 MMOL/L (ref 4–13)
AST SERPL W P-5'-P-CCNC: 17 U/L (ref 5–45)
BACTERIA UR QL AUTO: ABNORMAL /HPF
BASOPHILS # BLD AUTO: 0.07 THOUSANDS/ΜL (ref 0–0.1)
BASOPHILS NFR BLD AUTO: 1 % (ref 0–1)
BILIRUB SERPL-MCNC: 0.4 MG/DL (ref 0.2–1)
BILIRUB UR QL STRIP: NEGATIVE
BUN SERPL-MCNC: 20 MG/DL (ref 5–25)
CALCIUM SERPL-MCNC: 9.4 MG/DL (ref 8.3–10.1)
CHLORIDE SERPL-SCNC: 106 MMOL/L (ref 100–108)
CLARITY UR: CLEAR
CO2 SERPL-SCNC: 28 MMOL/L (ref 21–32)
COLOR UR: YELLOW
CREAT SERPL-MCNC: 1.43 MG/DL (ref 0.6–1.3)
EOSINOPHIL # BLD AUTO: 0.15 THOUSAND/ΜL (ref 0–0.61)
EOSINOPHIL NFR BLD AUTO: 2 % (ref 0–6)
ERYTHROCYTE [DISTWIDTH] IN BLOOD BY AUTOMATED COUNT: 13.2 % (ref 11.6–15.1)
GFR SERPL CREATININE-BSD FRML MDRD: 59 ML/MIN/1.73SQ M
GLUCOSE SERPL-MCNC: 109 MG/DL (ref 65–140)
GLUCOSE UR STRIP-MCNC: NEGATIVE MG/DL
HCT VFR BLD AUTO: 50.7 % (ref 36.5–49.3)
HGB BLD-MCNC: 16.3 G/DL (ref 12–17)
HGB UR QL STRIP.AUTO: NEGATIVE
HYALINE CASTS #/AREA URNS LPF: ABNORMAL /LPF
IMM GRANULOCYTES # BLD AUTO: 0.06 THOUSAND/UL (ref 0–0.2)
IMM GRANULOCYTES NFR BLD AUTO: 1 % (ref 0–2)
KETONES UR STRIP-MCNC: NEGATIVE MG/DL
LEUKOCYTE ESTERASE UR QL STRIP: NEGATIVE
LIPASE SERPL-CCNC: 131 U/L (ref 73–393)
LYMPHOCYTES # BLD AUTO: 2.05 THOUSANDS/ΜL (ref 0.6–4.47)
LYMPHOCYTES NFR BLD AUTO: 21 % (ref 14–44)
MCH RBC QN AUTO: 29.7 PG (ref 26.8–34.3)
MCHC RBC AUTO-ENTMCNC: 32.1 G/DL (ref 31.4–37.4)
MCV RBC AUTO: 92 FL (ref 82–98)
MONOCYTES # BLD AUTO: 0.68 THOUSAND/ΜL (ref 0.17–1.22)
MONOCYTES NFR BLD AUTO: 7 % (ref 4–12)
NEUTROPHILS # BLD AUTO: 6.64 THOUSANDS/ΜL (ref 1.85–7.62)
NEUTS SEG NFR BLD AUTO: 68 % (ref 43–75)
NITRITE UR QL STRIP: NEGATIVE
NON-SQ EPI CELLS URNS QL MICRO: ABNORMAL /HPF
NRBC BLD AUTO-RTO: 0 /100 WBCS
PH UR STRIP.AUTO: 7.5 [PH]
PLATELET # BLD AUTO: 261 THOUSANDS/UL (ref 149–390)
PMV BLD AUTO: 9.8 FL (ref 8.9–12.7)
POTASSIUM SERPL-SCNC: 4.7 MMOL/L (ref 3.5–5.3)
PROT SERPL-MCNC: 7.4 G/DL (ref 6.4–8.2)
PROT UR STRIP-MCNC: ABNORMAL MG/DL
RBC # BLD AUTO: 5.49 MILLION/UL (ref 3.88–5.62)
RBC #/AREA URNS AUTO: ABNORMAL /HPF
SODIUM SERPL-SCNC: 142 MMOL/L (ref 136–145)
SP GR UR STRIP.AUTO: 1.01 (ref 1–1.03)
UROBILINOGEN UR QL STRIP.AUTO: 0.2 E.U./DL
WBC # BLD AUTO: 9.65 THOUSAND/UL (ref 4.31–10.16)
WBC #/AREA URNS AUTO: ABNORMAL /HPF

## 2021-04-13 PROCEDURE — 81001 URINALYSIS AUTO W/SCOPE: CPT | Performed by: EMERGENCY MEDICINE

## 2021-04-13 PROCEDURE — 85025 COMPLETE CBC W/AUTO DIFF WBC: CPT | Performed by: EMERGENCY MEDICINE

## 2021-04-13 PROCEDURE — 76705 ECHO EXAM OF ABDOMEN: CPT

## 2021-04-13 PROCEDURE — 96375 TX/PRO/DX INJ NEW DRUG ADDON: CPT

## 2021-04-13 PROCEDURE — 74177 CT ABD & PELVIS W/CONTRAST: CPT

## 2021-04-13 PROCEDURE — 96361 HYDRATE IV INFUSION ADD-ON: CPT

## 2021-04-13 PROCEDURE — 96376 TX/PRO/DX INJ SAME DRUG ADON: CPT

## 2021-04-13 PROCEDURE — 99285 EMERGENCY DEPT VISIT HI MDM: CPT

## 2021-04-13 PROCEDURE — 83690 ASSAY OF LIPASE: CPT | Performed by: EMERGENCY MEDICINE

## 2021-04-13 PROCEDURE — 36415 COLL VENOUS BLD VENIPUNCTURE: CPT | Performed by: EMERGENCY MEDICINE

## 2021-04-13 PROCEDURE — 80053 COMPREHEN METABOLIC PANEL: CPT | Performed by: EMERGENCY MEDICINE

## 2021-04-13 PROCEDURE — 96374 THER/PROPH/DIAG INJ IV PUSH: CPT

## 2021-04-13 PROCEDURE — 99284 EMERGENCY DEPT VISIT MOD MDM: CPT | Performed by: EMERGENCY MEDICINE

## 2021-04-13 PROCEDURE — 96372 THER/PROPH/DIAG INJ SC/IM: CPT

## 2021-04-13 RX ORDER — FENTANYL CITRATE 50 UG/ML
1 INJECTION, SOLUTION INTRAMUSCULAR; INTRAVENOUS ONCE
Status: COMPLETED | OUTPATIENT
Start: 2021-04-13 | End: 2021-04-13

## 2021-04-13 RX ORDER — HYDROMORPHONE HCL/PF 1 MG/ML
1 SYRINGE (ML) INJECTION ONCE
Status: COMPLETED | OUTPATIENT
Start: 2021-04-13 | End: 2021-04-13

## 2021-04-13 RX ORDER — NAPROXEN 500 MG/1
500 TABLET ORAL 2 TIMES DAILY WITH MEALS
Qty: 30 TABLET | Refills: 0 | Status: SHIPPED | OUTPATIENT
Start: 2021-04-13 | End: 2022-06-30

## 2021-04-13 RX ORDER — DIAZEPAM 5 MG/ML
5 INJECTION, SOLUTION INTRAMUSCULAR; INTRAVENOUS ONCE
Status: COMPLETED | OUTPATIENT
Start: 2021-04-13 | End: 2021-04-13

## 2021-04-13 RX ORDER — DIAZEPAM 5 MG/1
5 TABLET ORAL EVERY 8 HOURS PRN
Qty: 20 TABLET | Refills: 0 | Status: SHIPPED | OUTPATIENT
Start: 2021-04-13 | End: 2022-06-30

## 2021-04-13 RX ORDER — KETOROLAC TROMETHAMINE 30 MG/ML
30 INJECTION, SOLUTION INTRAMUSCULAR; INTRAVENOUS ONCE
Status: COMPLETED | OUTPATIENT
Start: 2021-04-13 | End: 2021-04-13

## 2021-04-13 RX ADMIN — DIAZEPAM 5 MG: 10 INJECTION, SOLUTION INTRAMUSCULAR; INTRAVENOUS at 12:24

## 2021-04-13 RX ADMIN — SODIUM CHLORIDE 1000 ML: 0.9 INJECTION, SOLUTION INTRAVENOUS at 10:15

## 2021-04-13 RX ADMIN — DIAZEPAM 5 MG: 10 INJECTION, SOLUTION INTRAMUSCULAR; INTRAVENOUS at 10:13

## 2021-04-13 RX ADMIN — HYDROMORPHONE HYDROCHLORIDE 1 MG: 1 INJECTION, SOLUTION INTRAMUSCULAR; INTRAVENOUS; SUBCUTANEOUS at 10:04

## 2021-04-13 RX ADMIN — IOHEXOL 100 ML: 350 INJECTION, SOLUTION INTRAVENOUS at 10:54

## 2021-04-13 RX ADMIN — KETOROLAC TROMETHAMINE 30 MG: 30 INJECTION, SOLUTION INTRAMUSCULAR at 13:46

## 2021-04-13 NOTE — Clinical Note
Cara Saúllorie was seen and treated in our emergency department on 4/13/2021  Diagnosis:     Niru Sweeney  may return to work on return date  He may return on this date: 04/19/2021         If you have any questions or concerns, please don't hesitate to call        Adrián Richardson, MINO    ______________________________           _______________          _______________  Hospital Representative                              Date                                Time

## 2021-04-13 NOTE — ED PROVIDER NOTES
History  Chief Complaint   Patient presents with    Abdominal Pain     Patient states he got up from a chair this morning and heard a "pop:"  Patient is now c/o LLQ right side abdominal pain 8/10  Patient did recieve 100mg IV fentanyl from EMS prior to arrival to department with juaquin pastor in pain  39 y o  M presents w sharp acute onset r sided abd pain that started this AM when he was getting up from a chair  He felt an acute "pop" in his R sided abd wall, and has had severe abd pain since the event - given fentanyl per EMS enroute - did help pain a little but patient still in significant pain  Denies trauma to the Abd, no F/C, no N/V/D/C  No hx of surgery to abd  No hx of similar pain In the past  No overlay sin rash, no notable zoster, bruising, etc            Prior to Admission Medications   Prescriptions Last Dose Informant Patient Reported? Taking?   acetaminophen (TYLENOL) 650 mg CR tablet  Self No No   Sig: Take 1 tablet (650 mg total) by mouth every 8 (eight) hours as needed for mild pain   gabapentin (NEURONTIN) 300 mg capsule  Self No No   Sig: Take 1 capsule (300 mg total) by mouth 3 (three) times a day   meloxicam (MOBIC) 15 mg tablet  Self No No   Sig: Take 1 tablet (15 mg total) by mouth daily   naproxen (NAPROSYN) 375 mg tablet   No No   Sig: Take 1 tablet (375 mg total) by mouth 2 (two) times a day with meals      Facility-Administered Medications: None       History reviewed  No pertinent past medical history      Past Surgical History:   Procedure Laterality Date    BACK SURGERY      HERNIA REPAIR      KNEE ARTHROSCOPY      [facility] KNEE SCOPE,MED OR LAT MENIS REPAIR Right 5/15/2020    Procedure: partial menisectomy;  Surgeon: Nelida Trujillo MD;  Location: [facility];  Service: Orthopedics    [place] REVISE ULNAR NERVE AT ELBOW Left 2/27/2019    Procedure: Michael Murillo;  Surgeon: Maddie Bustos MD;  Location: [facility];  Service: Orthopedics    [facility]ATOR CUFF [facility]      TONSILLECTOMY         Family History   Problem Relation Age of Onset    No Known Problems Mother     Allergies Father     Hypertension Father      I have reviewed and agree with the history as documented  E-Cigarette/Vaping    E-Cigarette Use Former User      E-Cigarette/Vaping Substances     Social History     Tobacco Use    Smoking status: Current Every Day Smoker     Packs/day: 1 00     Types: Cigarettes    Smokeless tobacco: Never Used   Substance Use Topics    Alcohol use: Yes     Frequency: Monthly or less    Drug use: No       Review of Systems   Constitutional: Negative for chills, fatigue and fever  Respiratory: Negative for cough, chest tightness and shortness of breath  Cardiovascular: Negative for chest pain and palpitations  Gastrointestinal: Positive for abdominal pain (R sided, acute)  Negative for constipation, diarrhea, nausea and vomiting  Genitourinary: Negative for dysuria and flank pain  Musculoskeletal: Negative for back pain and neck pain  Skin: Negative for color change and rash  Allergic/Immunologic: Negative for immunocompromised state  Neurological: Negative for dizziness, syncope and headaches  Physical Exam  Physical Exam  Vitals signs reviewed  Constitutional:       General: He is in acute distress (2/2 pain)  Appearance: He is well-developed  He is not ill-appearing, toxic-appearing or diaphoretic  HENT:      Head: Normocephalic and atraumatic  Eyes:      General: No scleral icterus  Right eye: No discharge  Left eye: No discharge  Conjunctiva/sclera: Conjunctivae normal       Pupils: Pupils are equal, round, and reactive to light  Neck:      Musculoskeletal: Normal range of motion and neck supple  Vascular: No JVD  Cardiovascular:      Rate and Rhythm: Normal rate and regular rhythm  Heart sounds: Normal heart sounds  No murmur  No friction rub  No gallop      Pulmonary:      Effort: Pulmonary effort is normal  No respiratory distress  Breath sounds: Normal breath sounds  No wheezing or rales  Chest:      Chest wall: No tenderness  Abdominal:      General: Bowel sounds are normal  There is no distension  Palpations: Abdomen is soft  Tenderness: There is abdominal tenderness in the right upper quadrant and right lower quadrant  There is guarding (R sided)  There is no right CVA tenderness or rebound  Positive signs include Fairchild's sign  Negative signs include McBurney's sign  Hernia: No hernia is present  Musculoskeletal: Normal range of motion  General: No tenderness or deformity  Skin:     General: Skin is warm and dry  Coloration: Skin is not pale  Findings: No erythema or rash  Neurological:      Mental Status: He is alert and oriented to person, place, and time  Cranial Nerves: No cranial nerve deficit     Psychiatric:         Behavior: Behavior normal          Vital Signs  ED Triage Vitals   Temperature Pulse Respirations Blood Pressure SpO2   04/13/21 0932 04/13/21 0932 04/13/21 0932 04/13/21 0932 04/13/21 0932   99 1 °F (37 3 °C) (!) 107 18 165/100 97 %      Temp Source Heart Rate Source Patient Position - Orthostatic VS BP Location FiO2 (%)   04/13/21 0932 04/13/21 0932 04/13/21 0932 04/13/21 0932 --   Oral Monitor Lying Right arm       Pain Score       04/13/21 0934       9           Vitals:    04/13/21 0932 04/13/21 1135 04/13/21 1315   BP: 165/100 150/85 150/80   Pulse: (!) 107 94 98   Patient Position - Orthostatic VS: Lying Lying Sitting         Visual Acuity      ED Medications  Medications   fentanyl citrate (PF) (FOR EMS ONLY) 100 mcg/2 mL injection 100 mcg (0 mcg Does not apply Given to EMS 4/13/21 1004)   HYDROmorphone (DILAUDID) injection 1 mg (1 mg Intravenous Given 4/13/21 1004)   diazepam (VALIUM) injection 5 mg (5 mg Intravenous Given 4/13/21 1013)   sodium chloride 0 9 % bolus 1,000 mL (0 mL Intravenous Stopped 4/13/21 1224)   iohexol (OMNIPAQUE) 350 MG/ML injection (SINGLE-DOSE) 100 mL (100 mL Intravenous Given 4/13/21 1054)   diazepam (VALIUM) injection 5 mg (5 mg Intravenous Given 4/13/21 1224)   ketorolac (TORADOL) injection 30 mg (30 mg Intramuscular Given 4/13/21 1346)       Diagnostic Studies  Results Reviewed     Procedure Component Value Units Date/Time    Urine Microscopic [[id number]]  (Abnormal) Collected: 04/13/21 1135    Lab Status: Final result Specimen: Urine, Clean Catch Updated: 04/13/21 1154     RBC, UA None Seen /hpf      WBC, UA None Seen /hpf      Epithelial Cells Occasional /hpf      Bacteria, UA Occasional /hpf      Hyaline Casts, UA 1-2 /lpf     UA w Reflex to Microscopic w Reflex to Culture [[id number]]  (Abnormal) Collected: 04/13/21 1135    Lab Status: Final result Specimen: Urine, Clean Catch Updated: 04/13/21 1144     Color, UA Yellow     Clarity, UA Clear     Specific Gravity, UA 1 015     pH, UA 7 5     Leukocytes, UA Negative     Nitrite, UA Negative     Protein, UA 30 (1+) mg/dl      Glucose, UA Negative mg/dl      Ketones, UA Negative mg/dl      Urobilinogen, UA 0 2 E U /dl      Bilirubin, UA Negative     Blood, UA Negative    Lipase [[id number]]  (Normal) Collected: 04/13/21 1013    Lab Status: Final result Specimen: Blood from Arm, Left Updated: 04/13/21 1043     Lipase 131 u/L     Comprehensive metabolic panel [[id number]]  (Abnormal) Collected: 04/13/21 1013    Lab Status: Final result Specimen: Blood from Arm, Left Updated: 04/13/21 1043     Sodium 142 mmol/L      Potassium 4 7 mmol/L      Chloride 106 mmol/L      CO2 28 mmol/L      ANION GAP 8 mmol/L      BUN 20 mg/dL      Creatinine 1 43 mg/dL      Glucose 109 mg/dL      Calcium 9 4 mg/dL      AST 17 U/L      ALT 26 U/L      Alkaline Phosphatase 108 U/L      Total Protein 7 4 g/dL      Albumin 4 2 g/dL      Total Bilirubin 0 40 mg/dL      eGFR 59 ml/min/1 73sq m     Narrative:      [place] guidelines for Chronic Kidney Disease (CKD):   Stage 1 with normal or high GFR (GFR > 90 mL/min/1 73 square meters)    Stage 2 Mild CKD (GFR = 60-89 mL/min/1 73 square meters)    Stage 3A Moderate CKD (GFR = 45-59 mL/min/1 73 square meters)    Stage 3B Moderate CKD (GFR = 30-44 mL/min/1 73 square meters)    Stage 4 Severe CKD (GFR = 15-29 mL/min/1 73 square meters)    Stage 5 End Stage CKD (GFR <15 mL/min/1 73 square meters)  Note: GFR calculation is accurate only with a steady state creatinine    CBC and differential [[id number]]  (Abnormal) Collected: 04/13/21 1013    Lab Status: Final result Specimen: Blood from Arm, Left Updated: 04/13/21 1023     WBC 9 65 Thousand/uL      RBC 5 49 Million/uL      Hemoglobin 16 3 g/dL      Hematocrit 50 7 %      MCV 92 fL      MCH 29 7 pg      MCHC 32 1 g/dL      RDW 13 2 %      MPV 9 8 fL      Platelets 956 Thousands/uL      nRBC 0 /100 WBCs      Neutrophils Relative 68 %      Immat GRANS % 1 %      Lymphocytes Relative 21 %      Monocytes Relative 7 %      Eosinophils Relative 2 %      Basophils Relative 1 %      Neutrophils Absolute 6 64 Thousands/µL      Immature Grans Absolute 0 06 Thousand/uL      Lymphocytes Absolute 2 05 Thousands/µL      Monocytes Absolute 0 68 Thousand/µL      Eosinophils Absolute 0 15 Thousand/µL      Basophils Absolute 0 07 Thousands/µL                  US right upper quadrant   ED Interpretation by Rudy Dean DO (04/13 1325)   Apparent positive Fairchild's sign  Gallbladder however appears normal without wall thickening, gallstones or pericholecystic fluid      Enlarged fatty liver  Final Result by Stacy Roca DO (04/13 1315)      Apparent positive Fairchild's sign  Gallbladder however appears normal without wall thickening, gallstones or pericholecystic fluid  Enlarged fatty liver  Workstation performed: [id number]         CT abdomen pelvis with contrast   ED Interpretation by Rudy Dean DO (04/13 1201)   No acute pathology    Colonic diverticulosis without diverticulitis  Final Result by Dieter Coyle MD (04/13 1123)      No acute pathology  Colonic diverticulosis without diverticulitis  Workstation performed: [id number]                    Procedures  Procedures         ED Course  ED Course as of May 02 2314   Tue Apr 13, 2021   1044 Getting IVF   Creatinine(!): 1 43                             SBIRT 22yo+      Most Recent Value   SBIRT (23 yo +)   In order to provide better care to our patients, we are screening all of our patients for alcohol and drug use  Would it be okay to ask you these screening questions? Yes Filed at: 04/13/2021 1137   Initial Alcohol Screen: US AUDIT-C    1  How often do you have a drink containing alcohol?  0 Filed at: 04/13/2021 1137   2  How many drinks containing alcohol do you have on a typical day you are drinking? 0 Filed at: 04/13/2021 1137   3a  Male UNDER 65: How often do you have five or more drinks on one occasion? 0 Filed at: 04/13/2021 1137   Audit-C Score  0 Filed at: 04/13/2021 1137   FEDE: How many times in the past year have you    Used an illegal drug or used a prescription medication for non-medical reasons? Never Filed at: 04/13/2021 1137                    MDM  Number of Diagnoses or Management Options  Abdominal pain:   Diagnosis management comments: Eden Pulling R sided abd pain with movement started acutely tonight  Labwork normal - imaging normal except for + aquino's sign on US  No acute intra-abd pathology noted  Likely torn muscle wall / pulled abd muscle  Patient is under better pain control now  Stable for DC home with pain control and good RTED precautions in case of worsening symptoms  Discussed with patient and they understood the risks and benefits of discharge  Patient had opportunity to ask questions regarding care and discharge instructions and had no further questions    Advised follow up with PCP, advised returning if worsening, and discussed disease specific return precautions  Patient understood discharge instructions  Amount and/or Complexity of Data Reviewed  Clinical lab tests: ordered and reviewed  Tests in the radiology section of CPT®: ordered and reviewed        Disposition  Final diagnoses:   Abdominal pain     Time reflects when diagnosis was documented in both MDM as applicable and the Disposition within this note     Time User Action Codes Description Comment    4/13/2021  1:26 PM Zackary Ferris Add [R10 9] Abdominal pain       ED Disposition     ED Disposition Condition Date/Time Comment    Discharge Stable Tue Apr 13, 2021  1:26 PM Herbert Connor 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              Follow-up Information     Follow up With Specialties Details Why Contact Info Additional Information    Stefani Holley MD  Schedule an appointment as soon as possible for a visit  For follow up to ensure improvement, and for further testing and treatment as needed [address] [address] [place] [address] Emergency Department Emergency Medicine  If symptoms worsen: worsening abdominal pain, fever/chills, etc [address] Emergency Department, [address] [place] [address], [facility], MD General Surgery  as needed for gallbladder pain [address]  [address] [phone]             Discharge Medication List as of 4/13/2021  1:31 PM      START taking these medications    Details   diazepam (VALIUM) 5 mg tablet Take 1 tablet (5 mg total) by mouth every 8 (eight) hours as needed for muscle spasms for up to 10 days, Starting Tue 4/13/2021, Until Fri 4/23/2021, Normal      Diclofenac Sodium (VOLTAREN) 1 % Apply 2 g topically 4 (four) times a day Topically for pain control, Starting Tue 4/13/2021, Normal      !! naproxen (NAPROSYN) 500 mg tablet Take 1 tablet (500 mg total) by mouth 2 (two) times a day with meals, Starting Tue 4/13/2021, Normal       !! - Potential duplicate medications found  Please discuss with provider  CONTINUE these medications which have NOT CHANGED    Details   acetaminophen (TYLENOL) 650 mg CR tablet Take 1 tablet (650 mg total) by mouth every 8 (eight) hours as needed for mild pain, Starting Fri 5/15/2020, Normal      gabapentin (NEURONTIN) 300 mg capsule Take 1 capsule (300 mg total) by mouth 3 (three) times a day, Starting Fri 5/15/2020, Normal      meloxicam (MOBIC) 15 mg tablet Take 1 tablet (15 mg total) by mouth daily, Starting Fri 5/15/2020, Normal      !! naproxen (NAPROSYN) 375 mg tablet Take 1 tablet (375 mg total) by mouth 2 (two) times a day with meals, Starting Tue 3/9/2021, Print       !! - Potential duplicate medications found  Please discuss with provider  No discharge procedures on file      PDMP Review     None          ED Provider  Electronically Signed by           Tremayne Berg DO  05/02/21 0889

## 2021-04-15 ENCOUNTER — TELEPHONE (OUTPATIENT)
Dept: SURGERY | Facility: CLINIC | Age: 46
End: 2021-04-15

## 2021-04-23 ENCOUNTER — OFFICE VISIT (OUTPATIENT)
Dept: SURGERY | Facility: CLINIC | Age: 46
End: 2021-04-23

## 2021-04-23 VITALS
DIASTOLIC BLOOD PRESSURE: 78 MMHG | WEIGHT: 243.2 LBS | TEMPERATURE: 98.7 F | HEIGHT: 75 IN | BODY MASS INDEX: 30.24 KG/M2 | HEART RATE: 113 BPM | SYSTOLIC BLOOD PRESSURE: 140 MMHG

## 2021-04-23 DIAGNOSIS — R10.11 RIGHT UPPER QUADRANT ABDOMINAL PAIN: Primary | ICD-10-CM

## 2021-04-23 PROCEDURE — 99214 OFFICE O/P EST MOD 30 MIN: CPT | Performed by: SURGERY

## 2021-04-23 NOTE — PROGRESS NOTES
Follow-up- General Surgery   Bobbi Cruz 39 y o  male MRN: [id number]  Unit/Bed#:  Encounter: [id number]    Assessment/Plan     Assessment:  Right-sided abdominal pain, most likely muscle skeletal, doubt gallbladder origin or any  Type of hernia  Plan:   no further workup or intervention is needed from the surgical standpoint  I will defer further management to his primary care physician  The patient is discharged from my care  History of Present Illness     HPI:  Bobbi Cruz is a 39 y o  male who presents to my office after emergency room follow-up  The patient went to the emergency room last Tuesday because of severe pain on the right side of the abdomen, he was bending over and he was brought to the emergency room by the squad, the pain did not get better after being in the emergency room and given pain medications  He denied having any chills, fever, nausea, vomiting, diarrhea, constipation or any other constitutional symptoms  He was just talking on the phone with his boss well the pain started  The patient had extensive workup in the emergency room included CT scan of the abdomen and pelvis, ultrasound and lab work, see full reports below  Review of Systems   All other systems reviewed and are negative  Historical Information   History reviewed  No pertinent past medical history    Past Surgical History:   Procedure Laterality Date    BACK SURGERY      HERNIA REPAIR      KNEE ARTHROSCOPY      MD KNEE SCOPE,MED OR LAT MENIS REPAIR Right 5/15/2020    Procedure: partial menisectomy;  Surgeon: Rachel Garces MD;  Location: [facility];  Service: Orthopedics    MD REVISE ULNAR NERVE AT ELBOW Left 2/27/2019    Procedure: Roseline Pay;  Surgeon: Marisa Lazo MD;  Location: [facility];  Service: Orthopedics    ROTATOR CUFF REPAIR      TONSILLECTOMY       Social History   Social History     Substance and Sexual Activity   Alcohol Use Yes    Frequency: Monthly or less Social History     Substance and Sexual Activity   Drug Use No     Social History     Tobacco Use   Smoking Status Current Every Day Smoker    Packs/day: 1 00    Types: Cigarettes   Smokeless Tobacco Never Used     Family History: non-contributory    Meds/Allergies   all medications and allergies reviewed     Current Outpatient Medications:     acetaminophen (TYLENOL) 650 mg CR tablet, Take 1 tablet (650 mg total) by mouth every 8 (eight) hours as needed for mild pain, Disp: 30 tablet, Rfl: 0    diazepam (VALIUM) 5 mg tablet, Take 1 tablet (5 mg total) by mouth every 8 (eight) hours as needed for muscle spasms for up to 10 days, Disp: 20 tablet, Rfl: 0    Diclofenac Sodium (VOLTAREN) 1 %, Apply 2 g topically 4 (four) times a day Topically for pain control, Disp: 100 g, Rfl: 0    gabapentin (NEURONTIN) 300 mg capsule, Take 1 capsule (300 mg total) by mouth 3 (three) times a day, Disp: 50 capsule, Rfl: 1    meloxicam (MOBIC) 15 mg tablet, Take 1 tablet (15 mg total) by mouth daily, Disp: 30 tablet, Rfl: 1    naproxen (NAPROSYN) 375 mg tablet, Take 1 tablet (375 mg total) by mouth 2 (two) times a day with meals, Disp: 20 tablet, Rfl: 0    naproxen (NAPROSYN) 500 mg tablet, Take 1 tablet (500 mg total) by mouth 2 (two) times a day with meals, Disp: 30 tablet, Rfl: 0  No Known Allergies    Objective     Current Vitals:   Blood Pressure: 140/78 (04/23/21 0800)  Pulse: (!) 113 (04/23/21 0800)  Temperature: 98 7 °F (37 1 °C) (04/23/21 0800)  Temp Source: Temporal (04/23/21 0800)  Height: 6' 3" (190 5 cm) (04/23/21 0800)  Weight - Scale: 110 kg (243 lb 3 2 oz) (04/23/21 0800)      Physical Exam  Vitals signs and nursing note reviewed  Constitutional:       General: He is not in acute distress  Cardiovascular:      Rate and Rhythm: Normal rate and regular rhythm  Heart sounds: No murmur  Pulmonary:      Effort: No respiratory distress  Breath sounds: Normal breath sounds     Abdominal: Comments: Abdomen is soft, nondistended and moderate tenderness on the right side of the abdomen, exacerbated by flexing the abdominal wall muscles, the pain is most likely from the rectus muscle  No visceromegaly or mass palpable  Skin:     General: Skin is warm  Coloration: Skin is not jaundiced  Findings: No erythema or rash  Neurological:      Mental Status: He is alert and oriented to person, place, and time  Cranial Nerves: No cranial nerve deficit     Psychiatric:         Mood and Affect: Mood normal          Behavior: Behavior normal        Lab Results:   CBC and differential  Order: [id number]  Status:  Final result   Visible to patient:  No (inaccessible in [facility])   Next appt:  None   Ref Range & Units 4/13/21 10:13 AM   WBC 4 31 - 10 16 Thousand/uL 9 65    RBC 3 88 - 5 62 Million/uL 5 49    Hemoglobin 12 0 - 17 0 g/dL 16 3    Hematocrit 36 5 - 49 3 % 50 7High     MCV 82 - 98 fL 92    MCH 26 8 - 34 3 pg 29 7    MCHC 31 4 - 37 4 g/dL 32 1    RDW 11 6 - 15 1 % 13 2    MPV 8 9 - 12 7 fL 9 8    Platelets 858 - 640 Thousands/uL 261    nRBC /100 WBCs 0    Neutrophils Relative 43 - 75 % 68    Immat GRANS % 0 - 2 % 1    Lymphocytes Relative 14 - 44 % 21    Monocytes Relative 4 - 12 % 7    Eosinophils Relative 0 - 6 % 2    Basophils Relative 0 - 1 % 1    Neutrophils Absolute 1 85 - 7 62 Thousands/µL 6 64    Immature Grans Absolute 0 00 - 0 20 Thousand/uL 0 06    Lymphocytes Absolute 0 60 - 4 47 Thousands/µL 2 05    Monocytes Absolute 0 17 - 1 22 Thousand/µL 0 68    Eosinophils Absolute 0 00 - 0 61 Thousand/µL 0 15    Basophils Absolute 0 00 - 0 10 Thousands/µL 0 07          Specimen Collected: 04/13/21 10:13 AM   Last Resulted: 04/13/21 10:23 AM        Lab Flowsheet     Order Details     View Encounter     Lab and Collection Details     Routing     Result History              Ordered On 4/13/2021 10:04 AM    Ordering Provider Authorizing Provider Ordering User Ordering Department Romeo Briones, DO Romeo Briones, DO Romeo Broines, [facility]    [phone]        Other Results from 4/13/2021    Contains abnormal data UA w Reflex to Microscopic w Reflex to Culture  Order: [id number]    Status:  Final result   Visible to patient:  No (inaccessible in [facility])   Next appt:  None  Specimen Information: Urine, Clean Catch         Ref Range & Units 4/13/21 11:35 AM   Color, UA  Yellow    Clarity, UA  Clear    Specific [place], UA 1 003 - 1 030 1 015    pH, UA 4 5, 5 0, 5 5, 6 0, 6 5, 7 0, 7 5, 8 0 7 5    Leukocytes, UA Negative Negative    Nitrite, UA Negative Negative    Protein, UA Negative mg/dl 30 (1+)Abnormal     Glucose, UA Negative mg/dl Negative    Ketones, UA Negative mg/dl Negative    Urobilinogen, UA 0 2, 1 0 E U /dl E U /dl 0 2    Bilirubin, UA Negative Negative    Blood, UA Negative Negative          Specimen Collected: 04/13/21 11:35 AM   Last Resulted: 04/13/21 11:44 AM        Lab Flowsheet     Order Details     View Encounter     Lab and Collection Details     Routing     Result History              Ordered On 4/13/2021 10:04 AM    Ordering Provider Authorizing Provider Ordering User Ordering [address] [place] [address],  Romeo Briones,  Romeo [facility], [facility]    [phone]           Contains abnormal data Urine Microscopic  Order: [id number] - Reflex for Order [id number]    Status:  Final result   Visible to patient:  No (inaccessible in [facility])   Next appt:  None   Ref Range & Units 4/13/21 11:35 AM   RBC, UA None Seen, 0-1, 1-2, 2-4, 0-5 /hpf None Seen    WBC, UA None Seen, 0-1, 1-2, 0-5, 2-4 /hpf None Seen    Epithelial Cells None Seen, Occasional /hpf Occasional    Bacteria, UA None Seen, Occasional /hpf Occasional    Hyaline Casts, UA (none) /lpf 1-2Abnormal           Specimen Collected: 04/13/21 11:35 AM   Last Resulted: 04/13/21 11:54 AM        Lab Flowsheet     Order Details     View Encounter     Lab and Collection Details     Routing     Result History              Ordered On 4/13/2021 11:44 AM    Ordering Provider Authorizing Provider Ordering User Ordering [address] [facility], DO Interface, Lab Instrument Results In [place] ED    [phone]     [phone]           Contains abnormal data Comprehensive metabolic panel  Order: [id number]    Status:  Final result   Visible to patient:  No (inaccessible in [address])   Next appt:  None   Ref Range & Units 4/13/21 10:13 AM   Sodium 136 - 145 mmol/L 142    Potassium 3 5 - 5 3 mmol/L 4 7    Chloride 100 - 108 mmol/L 106    CO2 21 - 32 mmol/L 28    ANION GAP 4 - 13 mmol/L 8    BUN 5 - 25 mg/dL 20    Creatinine 0 60 - 1 30 mg/dL 1  43High     Comment: Standardized to IDMS reference method   Glucose 65 - 140 mg/dL 109    Comment: If the patient is fasting, the ADA then defines impaired fasting glucose as > 100 mg/dL and diabetes as > or equal to 123 mg/dL  Specimen collection should occur prior to Sulfasalazine administration due to the potential for falsely depressed results  Specimen collection should occur prior to Sulfapyridine administration due to the potential for falsely elevated results  Calcium 8 3 - 10 1 mg/dL 9 4    AST 5 - 45 U/L 17    Comment: Specimen collection should occur prior to Sulfasalazine administration due to the potential for falsely depressed results  ALT 12 - 78 U/L 26    Comment: Specimen collection should occur prior to Sulfasalazine administration due to the potential for falsely depressed results  Alkaline Phosphatase 46 - 116 U/L 108    Total Protein 6 4 - 8 2 g/dL 7 4    Albumin 3 5 - 5 0 g/dL 4 2    Total Bilirubin 0 20 - 1 00 mg/dL 0 40    Comment: Use of this assay is not recommended for patients undergoing treatment with eltrombopag due to the potential for falsely elevated results     eGFR ml/min/1 73sq m 59              Imaging: I have personally reviewed pertinent reports  Procedure: Xr Shoulder 2+ Views Right    Result Date: 3/9/2021  Narrative: RIGHT SHOULDER INDICATION:   pain s/p lifting heavy object  COMPARISON:  None VIEWS:  XR SHOULDER 2+ VW RIGHT FINDINGS: There is no acute fracture or dislocation  No significant degenerative changes  No lytic or blastic osseous lesion  Soft tissues are unremarkable  Impression: No acute osseous abnormality  Workstation performed: [id number]     Procedure: Us Right Upper Quadrant    Result Date: 4/13/2021  Narrative: RIGHT UPPER QUADRANT ULTRASOUND INDICATION:   Acute RUQ pain - positive fairchild's on bedside US exam  COMPARISON:  Ultrasound 3/16/2020, CT abdomen and pelvis earlier today TECHNIQUE:   Real-time ultrasound of the right upper quadrant was performed with a curvilinear transducer with both volumetric sweeps and still imaging techniques  FINDINGS: PANCREAS:  Visualized portions of the pancreas are within normal limits  AORTA AND IVC:  Visualized portions are normal for patient age  LIVER: Size:  Enlarged  The liver measures 20 8 cm in the midclavicular line  Contour:  Surface contour is smooth  Parenchyma:  Increased echogenicity in keeping with fatty infiltration  No evidence of suspicious mass  Limited imaging of the main portal vein shows it to be patent and hepatopetal   BILIARY: The gallbladder is normal in caliber  No wall thickening or pericholecystic fluid  No stones or sludge identified  Apparent positive Fairchild's sign  No intrahepatic biliary dilatation  CBD measures 4 mm  No choledocholithiasis  KIDNEY: Right kidney measures 10 8 x 5 9 cm  Within normal limits  ASCITES:   None  Impression: Apparent positive Fairchild's sign  Gallbladder however appears normal without wall thickening, gallstones or pericholecystic fluid  Enlarged fatty liver   Workstation performed: [id number]     Procedure: Ct Abdomen Pelvis With Contrast    Result Date: 4/13/2021  Narrative: CT ABDOMEN AND PELVIS WITH IV CONTRAST INDICATION:   abd pain  COMPARISON:  3/16/2020 TECHNIQUE:  CT examination of the abdomen and pelvis was performed  Axial, sagittal, and coronal 2D reformatted images were created from the source data and submitted for interpretation  Radiation dose length product (DLP) for this visit:  656 mGy-cm   This examination, like all CT scans performed in the [facility], was performed utilizing techniques to minimize radiation dose exposure, including the use of iterative reconstruction and automated exposure control  IV Contrast:  100 mL of iohexol (OMNIPAQUE) Enteric Contrast:  Enteric contrast was not administered  FINDINGS: ABDOMEN LOWER CHEST:  No clinically significant abnormality identified in the visualized lower chest  LIVER/BILIARY TREE:  Liver is diffusely decreased in density consistent with fatty change  No CT evidence of suspicious hepatic mass  Normal hepatic contours  No biliary dilatation  GALLBLADDER:  No calcified gallstones  No pericholecystic inflammatory change  SPLEEN:  Unremarkable  PANCREAS:  Unremarkable  ADRENAL GLANDS:  Unremarkable  KIDNEYS/URETERS:  No hydronephrosis or urinary tract calculus  One or more sharply circumscribed subcentimeter renal hypodensities are present, too small to accurately characterize, and statistically most likely benign findings  According to recent literature (Radiology 2019) no further workup of these findings is recommended  STOMACH AND BOWEL:  There is colonic diverticulosis without evidence of acute diverticulitis  APPENDIX:  Normal  ABDOMINOPELVIC CAVITY:  No ascites  No pneumoperitoneum  No lymphadenopathy  VESSELS:  Unremarkable for patient's age  PELVIS REPRODUCTIVE ORGANS:  Unremarkable for patient's age  URINARY BLADDER:  Unremarkable  ABDOMINAL WALL/INGUINAL REGIONS:  There is a small fat-containing umbilical hernia, unchanged from previous examination   OSSEOUS STRUCTURES:  No acute fracture or destructive osseous lesion  Impression: No acute pathology  Colonic diverticulosis without diverticulitis   Workstation performed: [id number]     EKG, Pathology, and Other Studies: I have personally reviewed pertinent films in PACS

## 2021-04-23 NOTE — PROGRESS NOTES
Assessment/Plan:    No problem-specific Assessment & Plan notes found for this encounter  Subjective: Gallbladder / Hernia     Patient ID: Hemal Gibson is a 39 y o  male  Objective: There were no vitals taken for this visit           Physical Exam

## 2021-12-21 ENCOUNTER — HOSPITAL ENCOUNTER (EMERGENCY)
Facility: HOSPITAL | Age: 46
Discharge: HOME | End: 2021-12-21
Attending: EMERGENCY MEDICINE | Admitting: EMERGENCY MEDICINE

## 2021-12-21 VITALS
SYSTOLIC BLOOD PRESSURE: 160 MMHG | HEART RATE: 83 BPM | HEIGHT: 75 IN | WEIGHT: 240 LBS | DIASTOLIC BLOOD PRESSURE: 97 MMHG | RESPIRATION RATE: 20 BRPM | BODY MASS INDEX: 29.84 KG/M2 | TEMPERATURE: 98.5 F | OXYGEN SATURATION: 99 %

## 2021-12-21 DIAGNOSIS — T50.905A ADVERSE EFFECT OF DRUG, INITIAL ENCOUNTER: Primary | ICD-10-CM

## 2021-12-21 LAB
ATRIAL RATE: 88 BPM
FLUAV RNA RESP QL NAA+PROBE: NEGATIVE
FLUBV RNA RESP QL NAA+PROBE: NEGATIVE
P AXIS: 72 DEGREES
PR INTERVAL: 122 MS
QRS AXIS: 74 DEGREES
QRSD INTERVAL: 92 MS
QT INTERVAL: 358 MS
QTC INTERVAL: 433 MS
RSV RNA RESP QL NAA+PROBE: NEGATIVE
SARS-COV-2 RNA RESP QL NAA+PROBE: POSITIVE
T WAVE AXIS: 60 DEGREES
VENTRICULAR RATE: 88 BPM

## 2021-12-21 PROCEDURE — 99285 EMERGENCY DEPT VISIT HI MDM: CPT | Performed by: EMERGENCY MEDICINE

## 2021-12-21 PROCEDURE — 0241U HB NFCT DS VIR RESP RNA 4 TRGT: CPT | Performed by: EMERGENCY MEDICINE

## 2021-12-21 PROCEDURE — 93010 ELECTROCARDIOGRAM REPORT: CPT | Performed by: INTERNAL MEDICINE

## 2021-12-21 PROCEDURE — 93005 ELECTROCARDIOGRAM TRACING: CPT

## 2021-12-21 PROCEDURE — 99284 EMERGENCY DEPT VISIT MOD MDM: CPT

## 2022-04-05 ENCOUNTER — TELEPHONE (OUTPATIENT)
Dept: OBGYN CLINIC | Facility: OTHER | Age: 47
End: 2022-04-05

## 2022-04-07 ENCOUNTER — OFFICE VISIT (OUTPATIENT)
Dept: OBGYN CLINIC | Facility: OTHER | Age: 47
End: 2022-04-07
Payer: COMMERCIAL

## 2022-04-07 ENCOUNTER — APPOINTMENT (OUTPATIENT)
Dept: RADIOLOGY | Facility: OTHER | Age: 47
End: 2022-04-07
Payer: COMMERCIAL

## 2022-04-07 VITALS — WEIGHT: 248.2 LBS | HEIGHT: 75 IN | BODY MASS INDEX: 30.86 KG/M2

## 2022-04-07 DIAGNOSIS — M23.92 INTERNAL DERANGEMENT OF LEFT KNEE: Primary | ICD-10-CM

## 2022-04-07 DIAGNOSIS — M25.562 LEFT KNEE PAIN, UNSPECIFIED CHRONICITY: ICD-10-CM

## 2022-04-07 DIAGNOSIS — M25.562 ACUTE PAIN OF LEFT KNEE: ICD-10-CM

## 2022-04-07 PROCEDURE — 99214 OFFICE O/P EST MOD 30 MIN: CPT | Performed by: ORTHOPAEDIC SURGERY

## 2022-04-07 PROCEDURE — 73564 X-RAY EXAM KNEE 4 OR MORE: CPT

## 2022-04-07 PROCEDURE — 73562 X-RAY EXAM OF KNEE 3: CPT

## 2022-04-07 NOTE — PROGRESS NOTES
Assessment  Diagnoses and all orders for this visit:    Internal derangement of left knee    Acute pain of left knee        Discussion and Plan:    Patient has an examination and mechanism of injury worrisome for internal derangement possibly meniscal or ligamentous pathology, given the nature of his symptoms he is indicated at this time for an MRI scan to evaluate for surgical pathology  I would also like the patient to start physical therapy to get the knee moving  I will have the patient follow up with my college Dr Naeem Delgado for evaluation and treatment after his MRI as his office is much closer to his home  Subjective:   Patient ID: Cathy Phillip is a 55 y o  male      HPI  The patient presents with a chief complaint of left knee pain  The pain began 1/18/22 from a slip and fall on a cell tower  Patient was initially seen at urgent care who ruled out fracture with x-rays  Patient did not bring copies which him  The patient describes the pain as aching, dull and sharp  It is intermittent in timing, and localizes the pain to the globally  The pain is worse with activities, walking and motion and relieved with rest   He admits to mechanical symptoms such as locking and catching  He admits to instability of the knee  The following portions of the patient's history were reviewed and updated as appropriate: allergies, current medications, past family history, past medical history, past social history, past surgical history and problem list     Review of Systems   Constitutional: Negative for chills and fever  HENT: Negative for drooling and hearing loss  Eyes: Negative for visual disturbance  Respiratory: Negative for cough and shortness of breath  Cardiovascular: Negative for chest pain  Gastrointestinal: Negative for abdominal pain  Skin: Negative for rash  Psychiatric/Behavioral: Negative for agitation         Objective:  Ht 6' 3" (1 905 m)   Wt 113 kg (248 lb 3 2 oz) BMI 31 02 kg/m²       Left Knee Exam     Tenderness   Left knee tenderness location: diffuse  Range of Motion   Left knee extension: 3  Flexion: 110     Tests   Thea:  Medial - positive Lateral - positive    Other   Erythema: absent  Sensation: normal  Pulse: present  Swelling: none  Effusion: no effusion present    Comments:    Patient is guarding on exam today            Physical Exam  Vitals reviewed  Constitutional:       Appearance: He is well-developed  HENT:      Head: Normocephalic  Eyes:      Pupils: Pupils are equal, round, and reactive to light  Pulmonary:      Effort: Pulmonary effort is normal    Musculoskeletal:      Left knee: No effusion  Instability Tests: Medial Thea test positive and lateral Thea test positive  Skin:     General: Skin is warm and dry  I have personally reviewed pertinent films in PACS and my interpretation is as follows  Left knee x-rays demonstrates no fracture or dislocation         Scribe Attestation    I,:  Jorge Cotton am acting as a scribe while in the presence of the attending physician :       I,:  Jase Hurley MD personally performed the services described in this documentation    as scribed in my presence :

## 2022-04-07 NOTE — LETTER
April 7, 2022     Patient: Manjula Sanchez   YOB: 1975   Date of Visit: 4/7/2022       To Whom it May Concern:    Manjula Sanchez is under my professional care  He was seen in my office on 4/7/2022  He may return to sedentary duty  If you have any questions or concerns, please don't hesitate to call           Sincerely,          Arron Lynn MD        CC: No Recipients

## 2022-04-08 ENCOUNTER — TELEPHONE (OUTPATIENT)
Dept: OBGYN CLINIC | Facility: HOSPITAL | Age: 47
End: 2022-04-08

## 2022-04-08 NOTE — TELEPHONE ENCOUNTER
Kim Lezama is calling from [employer] for the [address] and work status to be faxed to [phone]   I faxed the OVN and work status,as requested      Please print and fax the PT order to them also at [phone]

## 2022-04-10 ENCOUNTER — APPOINTMENT (EMERGENCY)
Dept: CT IMAGING | Facility: HOSPITAL | Age: 47
End: 2022-04-10
Payer: COMMERCIAL

## 2022-04-10 ENCOUNTER — HOSPITAL ENCOUNTER (EMERGENCY)
Facility: HOSPITAL | Age: 47
Discharge: HOME | End: 2022-04-11
Attending: EMERGENCY MEDICINE
Payer: COMMERCIAL

## 2022-04-10 ENCOUNTER — APPOINTMENT (EMERGENCY)
Dept: RADIOLOGY | Facility: HOSPITAL | Age: 47
End: 2022-04-10
Payer: COMMERCIAL

## 2022-04-10 VITALS
HEART RATE: 94 BPM | SYSTOLIC BLOOD PRESSURE: 167 MMHG | DIASTOLIC BLOOD PRESSURE: 92 MMHG | OXYGEN SATURATION: 97 % | RESPIRATION RATE: 22 BRPM | TEMPERATURE: 98.7 F | HEIGHT: 75 IN | BODY MASS INDEX: 31.02 KG/M2

## 2022-04-10 DIAGNOSIS — I10 HYPERTENSION: ICD-10-CM

## 2022-04-10 DIAGNOSIS — F41.9 ANXIETY: ICD-10-CM

## 2022-04-10 DIAGNOSIS — R07.9 CHEST PAIN, UNSPECIFIED: Primary | ICD-10-CM

## 2022-04-10 LAB
2HR DELTA HS TROPONIN: 0 NG/L
ALBUMIN SERPL BCP-MCNC: 4 G/DL (ref 3.5–5)
ALP SERPL-CCNC: 103 U/L (ref 46–116)
ALT SERPL W P-5'-P-CCNC: 40 U/L (ref 12–78)
ANION GAP SERPL CALCULATED.3IONS-SCNC: 17 MMOL/L (ref 4–13)
AST SERPL W P-5'-P-CCNC: 16 U/L (ref 5–45)
BASOPHILS # BLD AUTO: 0.05 THOUSANDS/ΜL (ref 0–0.1)
BASOPHILS NFR BLD AUTO: 1 % (ref 0–1)
BILIRUB SERPL-MCNC: 0.32 MG/DL (ref 0.2–1)
BILIRUB UR QL STRIP: NEGATIVE
BUN SERPL-MCNC: 21 MG/DL (ref 5–25)
CALCIUM SERPL-MCNC: 8.6 MG/DL (ref 8.3–10.1)
CARDIAC TROPONIN I PNL SERPL HS: 3 NG/L
CARDIAC TROPONIN I PNL SERPL HS: 3 NG/L
CHLORIDE SERPL-SCNC: 104 MMOL/L (ref 100–108)
CLARITY UR: CLEAR
CO2 SERPL-SCNC: 22 MMOL/L (ref 21–32)
COLOR UR: YELLOW
CREAT SERPL-MCNC: 1.36 MG/DL (ref 0.6–1.3)
D DIMER PPP FEU-MCNC: 0.5 UG/ML FEU
EOSINOPHIL # BLD AUTO: 0.24 THOUSAND/ΜL (ref 0–0.61)
EOSINOPHIL NFR BLD AUTO: 3 % (ref 0–6)
ERYTHROCYTE [DISTWIDTH] IN BLOOD BY AUTOMATED COUNT: 13.1 % (ref 11.6–15.1)
GFR SERPL CREATININE-BSD FRML MDRD: 61 ML/MIN/1.73SQ M
GLUCOSE SERPL-MCNC: 136 MG/DL (ref 65–140)
GLUCOSE UR STRIP-MCNC: NEGATIVE MG/DL
HCT VFR BLD AUTO: 45.6 % (ref 36.5–49.3)
HGB BLD-MCNC: 15.3 G/DL (ref 12–17)
HGB UR QL STRIP.AUTO: NEGATIVE
IMM GRANULOCYTES # BLD AUTO: 0.03 THOUSAND/UL (ref 0–0.2)
IMM GRANULOCYTES NFR BLD AUTO: 0 % (ref 0–2)
KETONES UR STRIP-MCNC: NEGATIVE MG/DL
LEUKOCYTE ESTERASE UR QL STRIP: NEGATIVE
LYMPHOCYTES # BLD AUTO: 2.36 THOUSANDS/ΜL (ref 0.6–4.47)
LYMPHOCYTES NFR BLD AUTO: 30 % (ref 14–44)
MCH RBC QN AUTO: 31.2 PG (ref 26.8–34.3)
MCHC RBC AUTO-ENTMCNC: 33.6 G/DL (ref 31.4–37.4)
MCV RBC AUTO: 93 FL (ref 82–98)
MONOCYTES # BLD AUTO: 0.53 THOUSAND/ΜL (ref 0.17–1.22)
MONOCYTES NFR BLD AUTO: 7 % (ref 4–12)
NEUTROPHILS # BLD AUTO: 4.58 THOUSANDS/ΜL (ref 1.85–7.62)
NEUTS SEG NFR BLD AUTO: 59 % (ref 43–75)
NITRITE UR QL STRIP: NEGATIVE
NRBC BLD AUTO-RTO: 0 /100 WBCS
NT-PROBNP SERPL-MCNC: 31 PG/ML
PH UR STRIP.AUTO: 6.5 [PH]
PLATELET # BLD AUTO: 242 THOUSANDS/UL (ref 149–390)
PMV BLD AUTO: 9.8 FL (ref 8.9–12.7)
POTASSIUM SERPL-SCNC: 4.2 MMOL/L (ref 3.5–5.3)
PROT SERPL-MCNC: 7 G/DL (ref 6.4–8.2)
PROT UR STRIP-MCNC: NEGATIVE MG/DL
RBC # BLD AUTO: 4.9 MILLION/UL (ref 3.88–5.62)
SODIUM SERPL-SCNC: 143 MMOL/L (ref 136–145)
SP GR UR STRIP.AUTO: 1.01 (ref 1–1.03)
UROBILINOGEN UR QL STRIP.AUTO: 0.2 E.U./DL
WBC # BLD AUTO: 7.79 THOUSAND/UL (ref 4.31–10.16)

## 2022-04-10 PROCEDURE — 99285 EMERGENCY DEPT VISIT HI MDM: CPT

## 2022-04-10 PROCEDURE — 80053 COMPREHEN METABOLIC PANEL: CPT | Performed by: EMERGENCY MEDICINE

## 2022-04-10 PROCEDURE — 93005 ELECTROCARDIOGRAM TRACING: CPT

## 2022-04-10 PROCEDURE — 83880 ASSAY OF NATRIURETIC PEPTIDE: CPT | Performed by: EMERGENCY MEDICINE

## 2022-04-10 PROCEDURE — 99284 EMERGENCY DEPT VISIT MOD MDM: CPT | Performed by: EMERGENCY MEDICINE

## 2022-04-10 PROCEDURE — 85379 FIBRIN DEGRADATION QUANT: CPT | Performed by: EMERGENCY MEDICINE

## 2022-04-10 PROCEDURE — G1004 CDSM NDSC: HCPCS

## 2022-04-10 PROCEDURE — 81003 URINALYSIS AUTO W/O SCOPE: CPT | Performed by: EMERGENCY MEDICINE

## 2022-04-10 PROCEDURE — 36415 COLL VENOUS BLD VENIPUNCTURE: CPT | Performed by: EMERGENCY MEDICINE

## 2022-04-10 PROCEDURE — 85025 COMPLETE CBC W/AUTO DIFF WBC: CPT | Performed by: EMERGENCY MEDICINE

## 2022-04-10 PROCEDURE — 71046 X-RAY EXAM CHEST 2 VIEWS: CPT

## 2022-04-10 PROCEDURE — 70450 CT HEAD/BRAIN W/O DYE: CPT

## 2022-04-10 PROCEDURE — 71275 CT ANGIOGRAPHY CHEST: CPT

## 2022-04-10 PROCEDURE — 84484 ASSAY OF TROPONIN QUANT: CPT | Performed by: EMERGENCY MEDICINE

## 2022-04-10 RX ORDER — SODIUM CHLORIDE 9 MG/ML
3 INJECTION INTRAVENOUS
Status: DISCONTINUED | OUTPATIENT
Start: 2022-04-10 | End: 2022-04-11 | Stop reason: HOSPADM

## 2022-04-10 RX ADMIN — IOHEXOL 90 ML: 350 INJECTION, SOLUTION INTRAVENOUS at 21:30

## 2022-04-10 NOTE — ED PROVIDER NOTES
History  Chief Complaint   Patient presents with    Chest Pain     Patient co chest pain and left arm pit pain  Patient states "my blood pressure has been high lately  "      54 y/o male presents to the ED for palpitations, chest pain, anxiety, and high blood pressure  He states that today he was sitting when he developed heart racing palpitations, anxiety, sob, and sharp/stabbing pain under his L armpit  States that the pain has been intermittent since  Nothing worsens or improves it  He states that he has had similar symptoms intermittently x months  States that he has not seen a doctor for them  Has been out of work due to a knee injury so he reports increased stress at home  He states that he took his BP today and it was high- 160s/100s  He has not taken anything for the symptoms  States that he has a Fhx of cardiac dz and is a smoker  Denies any other complaints  States that he has an appointemnt with his PCP tomorrow  History provided by:  Patient  Chest Pain  Pain location:  L chest  Pain quality: sharp and stabbing    Pain radiates to:  Does not radiate  Pain severity:  Mild  Onset quality:  Sudden  Timing:  Intermittent  Progression:  Unchanged  Chronicity:  Recurrent  Relieved by:  Nothing  Worsened by:  Nothing tried  Ineffective treatments:  None tried  Associated symptoms: anxiety and shortness of breath    Associated symptoms: no abdominal pain, no back pain, no cough, no diaphoresis, no fever, no headache, no nausea, no numbness, not vomiting and no weakness    Risk factors: smoking        Prior to Admission Medications   Prescriptions Last Dose Informant Patient Reported? Taking?    Diclofenac Sodium (VOLTAREN) 1 %   No No   Sig: Apply 2 g topically 4 (four) times a day Topically for pain control   Patient not taking: Reported on 4/7/2022    acetaminophen (TYLENOL) 650 mg CR tablet  Self No No   Sig: Take 1 tablet (650 mg total) by mouth every 8 (eight) hours as needed for mild pain   diazepam (VALIUM) 5 mg tablet   No No   Sig: Take 1 tablet (5 mg total) by mouth every 8 (eight) hours as needed for muscle spasms for up to 10 days   gabapentin (NEURONTIN) 300 mg capsule  Self No No   Sig: Take 1 capsule (300 mg total) by mouth 3 (three) times a day   Patient not taking: Reported on 4/7/2022    meloxicam (MOBIC) 15 mg tablet  Self No No   Sig: Take 1 tablet (15 mg total) by mouth daily   Patient not taking: Reported on 4/7/2022    naproxen (NAPROSYN) 375 mg tablet   No No   Sig: Take 1 tablet (375 mg total) by mouth 2 (two) times a day with meals   naproxen (NAPROSYN) 500 mg tablet   No No   Sig: Take 1 tablet (500 mg total) by mouth 2 (two) times a day with meals      Facility-Administered Medications: None       History reviewed  No pertinent past medical history  Past Surgical History:   Procedure Laterality Date    BACK SURGERY      HERNIA REPAIR      KNEE ARTHROSCOPY      WA KNEE SCOPE,MED OR LAT MENIS REPAIR Right 5/15/2020    Procedure: partial menisectomy;  Surgeon: Gopal Vazquez MD;  Location: [facility];  Service: Orthopedics    WA REVISE ULNAR NERVE AT ELBOW Left 2/27/2019    Procedure: Cesar Mayans;  Surgeon: Sadie Villasenor MD;  Location: [facility];  Service: Orthopedics    ROTATOR CUFF REPAIR      TONSILLECTOMY         Family History   Problem Relation Age of Onset    No Known Problems Mother     Allergies Father     Hypertension Father      I have reviewed and agree with the history as documented  E-Cigarette/Vaping    E-Cigarette Use Former User      E-Cigarette/Vaping Substances     Social History     Tobacco Use    Smoking status: Current Every Day Smoker     Packs/day: 1 00     Types: Cigarettes    Smokeless tobacco: Never Used   Vaping Use    Vaping Use: Former   Substance Use Topics    Alcohol use: Yes    Drug use: No       Review of Systems   Constitutional: Negative for chills, diaphoresis and fever     HENT: Negative for congestion, ear pain and sore throat  Eyes: Negative for pain and visual disturbance  Respiratory: Positive for shortness of breath  Negative for cough and wheezing  Cardiovascular: Positive for chest pain  Negative for leg swelling  Gastrointestinal: Negative for abdominal pain, diarrhea, nausea and vomiting  Genitourinary: Negative for dysuria, frequency, hematuria and urgency  Musculoskeletal: Negative for back pain, neck pain and neck stiffness  Skin: Negative for rash and wound  Neurological: Negative for weakness, numbness and headaches  Psychiatric/Behavioral: Negative for agitation and confusion  All other systems reviewed and are negative  Physical Exam  Physical Exam  Vitals and nursing note reviewed  Constitutional:       Appearance: He is well-developed  HENT:      Head: Normocephalic and atraumatic  Eyes:      Pupils: Pupils are equal, round, and reactive to light  Cardiovascular:      Rate and Rhythm: Normal rate and regular rhythm  Pulmonary:      Effort: Pulmonary effort is normal       Breath sounds: Normal breath sounds  Abdominal:      General: Bowel sounds are normal       Palpations: Abdomen is soft  Musculoskeletal:         General: Normal range of motion  Cervical back: Normal range of motion and neck supple  Skin:     General: Skin is warm and dry  Neurological:      General: No focal deficit present  Mental Status: He is alert and oriented to person, place, and time        Comments: No focal deficits         Vital Signs  ED Triage Vitals [04/10/22 1735]   Temperature Pulse Respirations Blood Pressure SpO2   98 7 °F (37 1 °C) (!) 106 19 (!) 184/119 99 %      Temp Source Heart Rate Source Patient Position - Orthostatic VS BP Location FiO2 (%)   Oral Monitor Sitting Right arm --      Pain Score       5           Vitals:    04/10/22 1930 04/10/22 2030 04/10/22 2130 04/10/22 2230   BP: 156/93 132/83 142/96 167/92   Pulse: 98 98 97 94   Patient Position - Orthostatic VS: Sitting Sitting Sitting Sitting         Visual Acuity      ED Medications  Medications   sodium chloride (PF) 0 9 % injection 3 mL (has no administration in time range)   iohexol (OMNIPAQUE) 350 MG/ML injection (SINGLE-DOSE) 90 mL (90 mL Intravenous Given 4/10/22 2130)       Diagnostic Studies  Results Reviewed     Procedure Component Value Units Date/Time    UA w Reflex to Microscopic w Reflex to Culture [[id number]] Collected: 04/10/22 2223    Lab Status: Final result Specimen: Urine, Other Updated: 04/10/22 2237     Color, UA Yellow     Clarity, UA Clear     Specific Gravity, UA 1 010     pH, UA 6 5     Leukocytes, UA Negative     Nitrite, UA Negative     Protein, UA Negative mg/dl      Glucose, UA Negative mg/dl      Ketones, UA Negative mg/dl      Urobilinogen, UA 0 2 E U /dl      Bilirubin, UA Negative     Blood, UA Negative    Comprehensive metabolic panel [[id number]]  (Abnormal) Collected: 04/10/22 1802    Lab Status: Final result Specimen: Blood from Arm, Right Updated: 04/10/22 2109     Sodium 143 mmol/L      Potassium 4 2 mmol/L      Chloride 104 mmol/L      CO2 22 mmol/L      ANION GAP 17 mmol/L      BUN 21 mg/dL      Creatinine 1 36 mg/dL      Glucose 136 mg/dL      Calcium 8 6 mg/dL      AST 16 U/L      ALT 40 U/L      Alkaline Phosphatase 103 U/L      Total Protein 7 0 g/dL      Albumin 4 0 g/dL      Total Bilirubin 0 32 mg/dL      eGFR 61 ml/min/1 73sq m     Narrative:      [place] guidelines for Chronic Kidney Disease (CKD):     Stage 1 with normal or high GFR (GFR > 90 mL/min/1 73 square meters)    Stage 2 Mild CKD (GFR = 60-89 mL/min/1 73 square meters)    Stage 3A Moderate CKD (GFR = 45-59 mL/min/1 73 square meters)    Stage 3B Moderate CKD (GFR = 30-44 mL/min/1 73 square meters)    Stage 4 Severe CKD (GFR = 15-29 mL/min/1 73 square meters)    Stage 5 End Stage CKD (GFR <15 mL/min/1 73 square meters)  Note: GFR calculation is accurate only with a steady state creatinine    HS Troponin I 2hr [[id number]]  (Normal) Collected: 04/10/22 2008    Lab Status: Final result Specimen: Blood from Arm, Right Updated: 04/10/22 2044     hs TnI 2hr 3 ng/L      Delta 2hr hsTnI 0 ng/L     HS Troponin I 4hr [[id number]]     Lab Status: No result Specimen: Blood     HS Troponin 0hr (reflex protocol) [[id number]]  (Normal) Collected: 04/10/22 1802    Lab Status: Final result Specimen: Blood from Arm, Right Updated: 04/10/22 1837     hs TnI 0hr 3 ng/L     NT-BNP PRO [[id number]]  (Normal) Collected: 04/10/22 1802    Lab Status: Final result Specimen: Blood from Arm, Right Updated: 04/10/22 1835     NT-proBNP 31 pg/mL     D-dimer, quantitative [[id number]]  (Abnormal) Collected: 04/10/22 1802    Lab Status: Final result Specimen: Blood from Arm, Right Updated: 04/10/22 1833     D-Dimer, Quant 0 50 ug/ml FEU     CBC and differential [[id number]] Collected: 04/10/22 1802    Lab Status: Final result Specimen: Blood from Arm, Right Updated: 04/10/22 1811     WBC 7 79 Thousand/uL      RBC 4 90 Million/uL      Hemoglobin 15 3 g/dL      Hematocrit 45 6 %      MCV 93 fL      MCH 31 2 pg      MCHC 33 6 g/dL      RDW 13 1 %      MPV 9 8 fL      Platelets 438 Thousands/uL      nRBC 0 /100 WBCs      Neutrophils Relative 59 %      Immat GRANS % 0 %      Lymphocytes Relative 30 %      Monocytes Relative 7 %      Eosinophils Relative 3 %      Basophils Relative 1 %      Neutrophils Absolute 4 58 Thousands/µL      Immature Grans Absolute 0 03 Thousand/uL      Lymphocytes Absolute 2 36 Thousands/µL      Monocytes Absolute 0 53 Thousand/µL      Eosinophils Absolute 0 24 Thousand/µL      Basophils Absolute 0 05 Thousands/µL                  CT head without contrast   Final Result by Sabine Landaverde MD (04/10 2237)      No acute intracranial hemorrhage, midline shift, or mass effect                    Workstation performed: [id number]         CTA ED chest PE Study   Final Result by Sabine Landaverde MD (04/10 2256)      No evidence of pulmonary embolism  Workstation performed: [id number]         X-ray chest 2 views    (Results Pending)              Procedures  ECG 12 Lead Documentation Only    Date/Time: 4/10/2022 6:45 PM  Performed by: Jak Thomason DO  Authorized by: Jak Thomason DO     Indications / Diagnosis:  Chest pain   Patient location:  ED  Rate:     ECG rate:  101    ECG rate assessment: tachycardic    Rhythm:     Rhythm: sinus tachycardia    Ectopy:     Ectopy: none    QRS:     QRS axis:  Normal    QRS intervals:  Normal  ST segments:     ST segments:  Normal  T waves:     T waves: normal    ECG 12 Lead Documentation Only    Date/Time: 4/10/2022 10:40 PM  Performed by: Jak Thomason DO  Authorized by: Jak Thomason DO     Indications / Diagnosis:  Chest pain delta   Patient location:  ED  Previous ECG:     Previous ECG:  Compared to current    Similarity:  No change  Rate:     ECG rate:  97    ECG rate assessment: normal    Rhythm:     Rhythm: sinus rhythm    Ectopy:     Ectopy: none    QRS:     QRS axis:  Normal    QRS intervals:  Normal  ST segments:     ST segments:  Normal  T waves:     T waves: normal               ED Course  ED Course as of 04/11/22 0104   Sun Apr 10, 2022   2050 Delta 2hr hsTnI: 0   2104 Ct scans delayed due to someone accidentally canceling the [facility]              HEART Risk Score      Most Recent Value   Heart Score Risk Calculator    History 0 Filed at: 04/10/2022 1916   ECG 1 Filed at: 04/10/2022 1916   Age 1 Filed at: 04/10/2022 1916   Risk Factors 1 Filed at: 04/10/2022 1916   Troponin 0 Filed at: 04/10/2022 1916   HEART Score 3 Filed at: 04/10/2022 1916                        SBIRT 22yo+      Most Recent Value   SBIRT (25 yo +)    In order to provide better care to our patients, we are screening all of our patients for alcohol and drug use  Would it be okay to ask you these screening questions?  No Filed at: 04/10/2022 1747                    MDM  Number of Diagnoses or Management Options  Anxiety: new and requires workup  Chest pain, unspecified: new and requires workup  Hypertension: new and requires workup  Diagnosis management comments: Patient with anxiety, cp, and high blood pressure- will get cardiac workup, ddimer, ct head, and delta trop/ekg  Will reassess for dispo    Patient reevaluated and feels improved  Patient updated on results of tests  Discharge instructions given including follow-up, and return precautions  Patient demonstrates verbal understanding and agrees with plan  Amount and/or Complexity of Data Reviewed  Clinical lab tests: ordered and reviewed  Tests in the radiology section of CPT®: ordered and reviewed  Tests in the medicine section of CPT®: ordered and reviewed  Discussion of test results with the performing providers: yes  Decide to obtain previous medical records or to obtain history from someone other than the patient: yes  Obtain history from someone other than the patient: yes  Review and summarize past medical records: yes  Discuss the patient with other providers: yes  Independent visualization of images, tracings, or specimens: yes    Patient Progress  Patient progress: improved      Disposition  Final diagnoses:   Chest pain, unspecified   Anxiety   Hypertension     Time reflects when diagnosis was documented in both MDM as applicable and the Disposition within this note     Time User Action Codes Description Comment    4/10/2022 10:58 PM Julia Dickerson Run A Add [R07 9] Chest pain, unspecified     4/10/2022 10:58 PM Julia Dickerson Run A Add [F41 9] Anxiety     4/10/2022 10:58 PM Julia Dickerson Run A Add [I10] Hypertension       ED Disposition     ED Disposition Condition Date/Time Comment    Discharge Stable Sun Apr 10, 2022 10:58 PM Onesmio Jordan 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              Follow-up Information     Follow up With Specialties Details Why Contact Info Additional Information    Kishore Nix MD  Go to  tomorrow as scheduled [phone] [facility] [address]  [facility] Emergency Department Emergency Medicine Go to  immediately for any new or worsening symptoms Katina Pandey [address] [facility] Emergency Department, [address] [place] [address], [facility]          Discharge Medication List as of 4/10/2022 10:59 PM      CONTINUE these medications which have NOT CHANGED    Details   acetaminophen (TYLENOL) 650 mg CR tablet Take 1 tablet (650 mg total) by mouth every 8 (eight) hours as needed for mild pain, Starting Fri 5/15/2020, Normal      diazepam (VALIUM) 5 mg tablet Take 1 tablet (5 mg total) by mouth every 8 (eight) hours as needed for muscle spasms for up to 10 days, Starting Tue 4/13/2021, Until Fri 4/23/2021, Normal      Diclofenac Sodium (VOLTAREN) 1 % Apply 2 g topically 4 (four) times a day Topically for pain control, Starting Tue 4/13/2021, Normal      gabapentin (NEURONTIN) 300 mg capsule Take 1 capsule (300 mg total) by mouth 3 (three) times a day, Starting Fri 5/15/2020, Normal      meloxicam (MOBIC) 15 mg tablet Take 1 tablet (15 mg total) by mouth daily, Starting Fri 5/15/2020, Normal      !! naproxen (NAPROSYN) 375 mg tablet Take 1 tablet (375 mg total) by mouth 2 (two) times a day with meals, Starting Tue 3/9/2021, Print      !! naproxen (NAPROSYN) 500 mg tablet Take 1 tablet (500 mg total) by mouth 2 (two) times a day with meals, Starting Tue 4/13/2021, Normal       !! - Potential duplicate medications found  Please discuss with provider  No discharge procedures on file      PDMP Review     None          ED Provider  Electronically Signed by           Fay Patiño DO  04/11/22 0104

## 2022-04-11 LAB
ATRIAL RATE: 101 BPM
ATRIAL RATE: 97 BPM
P AXIS: 67 DEGREES
P AXIS: 68 DEGREES
PR INTERVAL: 124 MS
PR INTERVAL: 132 MS
QRS AXIS: 67 DEGREES
QRS AXIS: 72 DEGREES
QRSD INTERVAL: 90 MS
QRSD INTERVAL: 94 MS
QT INTERVAL: 348 MS
QT INTERVAL: 356 MS
QTC INTERVAL: 451 MS
QTC INTERVAL: 452 MS
T WAVE AXIS: 53 DEGREES
T WAVE AXIS: 57 DEGREES
VENTRICULAR RATE: 101 BPM
VENTRICULAR RATE: 97 BPM

## 2022-04-11 PROCEDURE — 93010 ELECTROCARDIOGRAM REPORT: CPT | Performed by: INTERNAL MEDICINE

## 2022-04-12 ENCOUNTER — TELEPHONE (OUTPATIENT)
Dept: OBGYN CLINIC | Facility: OTHER | Age: 47
End: 2022-04-12

## 2022-05-09 ENCOUNTER — TELEPHONE (OUTPATIENT)
Dept: OBGYN CLINIC | Facility: HOSPITAL | Age: 47
End: 2022-05-09

## 2022-05-11 NOTE — TELEPHONE ENCOUNTER
Elin @ [facility] called to see if we can fax the script for OP Physical Therapy to their  Office    thanks     Fax # [phone]     C/b # [phone]

## 2022-05-11 NOTE — TELEPHONE ENCOUNTER
Elin @ [facility] called to see if we can fax that MRI Order from 04-07  Patient is in office now      Fax # [phone]    C/b # [phone]

## 2022-05-18 ENCOUNTER — TELEPHONE (OUTPATIENT)
Dept: OBGYN CLINIC | Facility: MEDICAL CENTER | Age: 47
End: 2022-05-18

## 2022-05-19 ENCOUNTER — OFFICE VISIT (OUTPATIENT)
Dept: OBGYN CLINIC | Facility: CLINIC | Age: 47
End: 2022-05-19
Payer: COMMERCIAL

## 2022-05-19 ENCOUNTER — TELEPHONE (OUTPATIENT)
Dept: OBGYN CLINIC | Facility: CLINIC | Age: 47
End: 2022-05-19

## 2022-05-19 VITALS
DIASTOLIC BLOOD PRESSURE: 94 MMHG | WEIGHT: 248 LBS | SYSTOLIC BLOOD PRESSURE: 135 MMHG | HEIGHT: 75 IN | HEART RATE: 97 BPM | BODY MASS INDEX: 30.84 KG/M2

## 2022-05-19 DIAGNOSIS — M23.92 INTERNAL DERANGEMENT OF LEFT KNEE: ICD-10-CM

## 2022-05-19 DIAGNOSIS — S83.242A OTHER TEAR OF MEDIAL MENISCUS, CURRENT INJURY, LEFT KNEE, INITIAL ENCOUNTER: Primary | ICD-10-CM

## 2022-05-19 DIAGNOSIS — M25.562 ACUTE PAIN OF LEFT KNEE: ICD-10-CM

## 2022-05-19 PROCEDURE — 99214 OFFICE O/P EST MOD 30 MIN: CPT | Performed by: ORTHOPAEDIC SURGERY

## 2022-05-19 PROCEDURE — 20610 DRAIN/INJ JOINT/BURSA W/O US: CPT | Performed by: ORTHOPAEDIC SURGERY

## 2022-05-19 RX ORDER — HYDROXYZINE PAMOATE 50 MG/1
50 CAPSULE ORAL 3 TIMES DAILY PRN
COMMUNITY
Start: 2022-04-11

## 2022-05-19 RX ORDER — METOPROLOL SUCCINATE 25 MG/1
25 TABLET, EXTENDED RELEASE ORAL DAILY
COMMUNITY
Start: 2022-04-11

## 2022-05-19 RX ADMIN — BUPIVACAINE HYDROCHLORIDE 2 ML: 2.5 INJECTION, SOLUTION INFILTRATION; PERINEURAL at 14:42

## 2022-05-19 RX ADMIN — LIDOCAINE HYDROCHLORIDE 2 ML: 10 INJECTION, SOLUTION INFILTRATION; PERINEURAL at 14:42

## 2022-05-19 RX ADMIN — METHYLPREDNISOLONE ACETATE 2 ML: 40 INJECTION, SUSPENSION INTRA-ARTICULAR; INTRALESIONAL; INTRAMUSCULAR; SOFT TISSUE at 14:42

## 2022-05-19 NOTE — TELEPHONE ENCOUNTER
FAXED PT ORDER TO [facility] [place] [facility]  [phone] AND TO HIS PHYSICAL THERAPY OFFICE [phone] AT HIS REQUEST

## 2022-05-19 NOTE — LETTER
May 19, 2022     Patient: Rosario Amaya  YOB: 1975  Date of Visit: 5/19/2022      To Whom it May Concern:    Rosario Amaya is under my professional care  Remberto Mora was seen in my office on 5/19/2022  May return to work with the following restrictions:  -No prolonged walking or standing  -No pivoting, twisting  -No deep knee flexion, lunging, squatting  -No lifting, pushing, pulling    If you have any questions or concerns, please don't hesitate to call           Sincerely,          Luis Sequeira DO        CC: No Recipients

## 2022-05-19 NOTE — PROGRESS NOTES
Patient Name:  Alisa Abdi  MRN:  [id number]    [address] [place] [address]     1  Other tear of medial meniscus, current injury, left knee, initial encounter  -     Ambulatory Referral to Physical Therapy; Future  -     Large joint arthrocentesis: L knee    2  Acute pain of left knee  -     Ambulatory Referral to Orthopedic Surgery    3  Internal derangement of left knee  -     Ambulatory Referral to Orthopedic Surgery        52 y o  male with Left knee medial meniscus tear and significant pain out of proportion to exam findings or imaging findings with attempt at evaluation of Left knee  X-rays and MRI reviewed in office today with patient  During evaluation of Left knee, patient screamed "That's enough" and no longer wanted to participate in physical exam  Able to perform some special testing after patient calmed down  In depth conversation had with the patient regarding nonoperative vs surgical management  Nonoperative treatment was strongly recommended consisting of outpatient PT to work on obtaining full active range of motion, strengthening, corticosteroid injections to decrease pain/inflammation, OTC oral/topical analgesocs  Will hold off on surgical intervention at this time secondary to moderate stiffness, pain out of proportion, inability to appropriately examine Left knee  Risks of surgery include stiffness and pain, specifically if meniscus repair to be performed; patient would have moderate stiffness and post operative function would be variable/questionable  Patient agreeable to corticosteroid injection to decrease pain and assist with PT  Risks and benefits of corticosteroid injection were discussed in detail  Risks including:  Post injection pain, elevation in blood sugar, skin discoloration, fatty atrophy, and infection were discussed in detail  The patient understood and elected to proceed forward    After sterile preparation the Left knee was injected with 2 cc of 1% lidocaine, 2 cc of 0 25% bupivacaine, and 2 cc of Depo-Medrol  The patient tolerated the procedure and no immediate complications were noted  The patient was instructed to ice and elevate the injection site, limit strenuous activity for the next 24-48 hours, and contact us if there were any questions or concerns prior to their follow-up appointment  They were also instructed to contact their primary care physician to discuss elevated blood sugar  I will see the patient back in 6-8 weeks for reevaluation of Left knee  In regards to tingling of Left lower extremity, can consider referral to additional physician for evaluation  Chief Complaint     Left knee pain    History of the Present Illness     Mary Ellen Coleman is a 52 y o  male with Left knee pain since January 18th, 2022 s/p mechanical fall and varus injury while at work; this is WC injury  He states he felt "tingly, like pop rocks" in the Left knee with radiation "in my toes" He did seek out medical care with Dr Chanell Figueroa whom ordered MRI to evaluate for internal derangement  Today, patient reports he continues to perform outpatient PT, but states "they're very light on me" secondary to swelling and pain of Left knee  Patient is currently out of work since February  He does administer ibuprofen for pain relief, but administers 4 tablets every 3 hours  Review of Systems     Review of Systems   Constitutional: Negative for chills and fever  HENT: Negative for ear pain and sore throat  Eyes: Negative for pain and visual disturbance  Respiratory: Negative for cough and shortness of breath  Cardiovascular: Negative for chest pain and palpitations  Gastrointestinal: Negative for abdominal pain and vomiting  Genitourinary: Negative for dysuria and hematuria  Musculoskeletal: Negative for arthralgias and back pain  Skin: Negative for color change and rash  Neurological: Negative for seizures and syncope     All other systems reviewed and are negative  Physical Exam     /94   Pulse 97   Ht 6' 3" (1 905 m)   Wt 112 kg (248 lb)   BMI 31 00 kg/m²     Left Knee  Range of motion from 3 to 45 with pain out of proportion  Unable to assess crepitus with range of motion secondary to pain out of proportion with any touch or attempt at range of motion  There is no effusion  There is pain out of proportion and tenderness over the medial patella, superior pole of patella, moderate pain/hypersensitivity with light palpation to assess for effusion  There is 4/5 quadriceps strength and preserved tone  The patient is ableperform a straight leg raise with moderate pain and extensor lag secondary to stiffness  Unable to assess patellar grind secondary to pain out of proportion during exam  negative Lachman Test    Unable to assess drawer testing   Varus stress testing reveals pain at 0 and 30 degrees without laxity  Valgus stress testing reveals no pain or laxity at 0 and 30 degrees  Unable to perform Thea secondary to pain out of proportion  The patient is neurovascular intact distally  Eyes:  Anicteric sclerae  Neck:  Supple  Lungs:  Normal respiratory effort  Cardiovascular:  Capillary refill is less than 2 seconds  Skin:  Intact without erythema  Neurologic:  Sensation grossly intact to light touch  Psychiatric:  Mood and affect are appropriate  Data Review     I have personally reviewed pertinent films in PACS, and my interpretation follows:    X-rays taken 05/19/2022 of Left knee demonstrates no acute fracture or dislocation  MRI performed of Left knee demonstrates partial tear at posterior root  History reviewed  No pertinent past medical history      Past Surgical History:   Procedure Laterality Date    BACK SURGERY      HERNIA REPAIR      KNEE ARTHROSCOPY      NV KNEE SCOPE,MED OR LAT MENIS REPAIR Right 5/15/2020    Procedure: partial menisectomy;  Surgeon: Moni Patton MD;  Location: [facility];  Service: Orthopedics    VT REVISE ULNAR NERVE AT ELBOW Left 2/27/2019    Procedure: CUBITAL TUNNEL RELEASE;  Surgeon: Tyrel Biggs MD;  Location: [facility];  Service: Orthopedics    ROTATOR CUFF REPAIR      TONSILLECTOMY         No Known Allergies    Current Outpatient Medications on File Prior to Visit   Medication Sig Dispense Refill    hydrOXYzine pamoate (VISTARIL) 50 mg capsule Take 50 mg by mouth as needed in the morning and 50 mg as needed at noon and 50 mg as needed in the evening   IBUPROFEN PO Take by mouth      metoprolol succinate (TOPROL-XL) 25 mg 24 hr tablet Take 25 mg by mouth in the morning   acetaminophen (TYLENOL) 650 mg CR tablet Take 1 tablet (650 mg total) by mouth every 8 (eight) hours as needed for mild pain (Patient not taking: Reported on 5/19/2022) 30 tablet 0    diazepam (VALIUM) 5 mg tablet Take 1 tablet (5 mg total) by mouth every 8 (eight) hours as needed for muscle spasms for up to 10 days (Patient not taking: Reported on 5/19/2022) 20 tablet 0    Diclofenac Sodium (VOLTAREN) 1 % Apply 2 g topically 4 (four) times a day Topically for pain control (Patient not taking: No sig reported) 100 g 0    gabapentin (NEURONTIN) 300 mg capsule Take 1 capsule (300 mg total) by mouth 3 (three) times a day (Patient not taking: No sig reported) 50 capsule 1    meloxicam (MOBIC) 15 mg tablet Take 1 tablet (15 mg total) by mouth daily (Patient not taking: No sig reported) 30 tablet 1    naproxen (NAPROSYN) 375 mg tablet Take 1 tablet (375 mg total) by mouth 2 (two) times a day with meals (Patient not taking: Reported on 5/19/2022) 20 tablet 0    naproxen (NAPROSYN) 500 mg tablet Take 1 tablet (500 mg total) by mouth 2 (two) times a day with meals (Patient not taking: Reported on 5/19/2022) 30 tablet 0     No current facility-administered medications on file prior to visit         Social History     Tobacco Use    Smoking status: Current Every Day Smoker     Packs/day: 1 00     Types: Cigarettes    Smokeless tobacco: Never Used   Vaping Use    Vaping Use: Former   Substance Use Topics    Alcohol use:  Yes    Drug use: No       Family History   Problem Relation Age of Onset    No Known Problems Mother     Allergies Father     Hypertension Father              Procedures Performed     Large joint arthrocentesis: L knee  Universal Protocol:  Consent given by: patient  Patient identity confirmed: verbally with patient    Procedure Details  Location: knee - L knee  Needle size: 22 G  Approach: lateral  Medications administered: 2 mL bupivacaine 0 25 %; 2 mL lidocaine 1 %; 2 mL methylPREDNISolone acetate 40 mg/mL    Patient tolerance: patient tolerated the procedure well with no immediate complications  Dressing:  Sterile dressing applied              Luis Miguel Ayala DO

## 2022-05-20 ENCOUNTER — TELEPHONE (OUTPATIENT)
Dept: OBGYN CLINIC | Facility: HOSPITAL | Age: 47
End: 2022-05-20

## 2022-05-20 NOTE — TELEPHONE ENCOUNTER
DR Cara Edwards  RE: Saleem Gleason from 5/19/22  #      Gordy Goldstein from [employer] called asking for [address] from 5/19/22  to be faxed to her      I e-faxed documents to:      Katherine Husbands: Claim # [id number]  FAX# [phone]

## 2022-05-27 RX ORDER — LIDOCAINE HYDROCHLORIDE 10 MG/ML
2 INJECTION, SOLUTION INFILTRATION; PERINEURAL
Status: COMPLETED | OUTPATIENT
Start: 2022-05-19 | End: 2022-05-19

## 2022-05-27 RX ORDER — METHYLPREDNISOLONE ACETATE 40 MG/ML
2 INJECTION, SUSPENSION INTRA-ARTICULAR; INTRALESIONAL; INTRAMUSCULAR; SOFT TISSUE
Status: COMPLETED | OUTPATIENT
Start: 2022-05-19 | End: 2022-05-19

## 2022-05-27 RX ORDER — BUPIVACAINE HYDROCHLORIDE 2.5 MG/ML
2 INJECTION, SOLUTION INFILTRATION; PERINEURAL
Status: COMPLETED | OUTPATIENT
Start: 2022-05-19 | End: 2022-05-19

## 2022-06-22 ENCOUNTER — TELEPHONE (OUTPATIENT)
Dept: OBGYN CLINIC | Facility: OTHER | Age: 47
End: 2022-06-22

## 2022-06-22 DIAGNOSIS — M25.562 ACUTE PAIN OF LEFT KNEE: Primary | ICD-10-CM

## 2022-06-22 DIAGNOSIS — M23.92 INTERNAL DERANGEMENT OF LEFT KNEE: ICD-10-CM

## 2022-06-22 DIAGNOSIS — S83.242A OTHER TEAR OF MEDIAL MENISCUS, CURRENT INJURY, LEFT KNEE, INITIAL ENCOUNTER: ICD-10-CM

## 2022-06-22 NOTE — TELEPHONE ENCOUNTER
Shantelle Gupta @ [facility] called to see if we can put in a new Physical Therapy script  If / when completed please fax      Fax # [phone]  Attn Jackie MOTA/dannie # [phone]

## 2022-06-30 ENCOUNTER — OFFICE VISIT (OUTPATIENT)
Dept: OBGYN CLINIC | Facility: CLINIC | Age: 47
End: 2022-06-30
Payer: COMMERCIAL

## 2022-06-30 VITALS
WEIGHT: 244.6 LBS | BODY MASS INDEX: 30.41 KG/M2 | HEIGHT: 75 IN | SYSTOLIC BLOOD PRESSURE: 152 MMHG | DIASTOLIC BLOOD PRESSURE: 99 MMHG | HEART RATE: 92 BPM

## 2022-06-30 DIAGNOSIS — S83.242D OTHER TEAR OF MEDIAL MENISCUS OF LEFT KNEE AS CURRENT INJURY, SUBSEQUENT ENCOUNTER: Primary | ICD-10-CM

## 2022-06-30 PROCEDURE — 99214 OFFICE O/P EST MOD 30 MIN: CPT | Performed by: ORTHOPAEDIC SURGERY

## 2022-06-30 RX ORDER — CHLORHEXIDINE GLUCONATE 4 G/100ML
SOLUTION TOPICAL DAILY PRN
Status: CANCELLED | OUTPATIENT
Start: 2022-06-30

## 2022-06-30 RX ORDER — CHLORHEXIDINE GLUCONATE 0.12 MG/ML
15 RINSE ORAL ONCE
Status: CANCELLED | OUTPATIENT
Start: 2022-06-30 | End: 2022-06-30

## 2022-06-30 NOTE — LETTER
June 30, 2022     Patient: Daja Abdul  YOB: 1975  Date of Visit: 6/30/2022      To Whom it May Concern:    Daja Abdul is under my professional care  Leda was seen in my office on 6/30/2022  Leda is not cleared to return to work at this time  If you have any questions or concerns, please don't hesitate to call           Sincerely,          Bon Souza DO        CC: Daja Abdul

## 2022-06-30 NOTE — H&P (VIEW-ONLY)
Patient Name:  Julio Shin  MRN:  [id number]    [address] [place], [place] [address]     1  Other tear of medial meniscus of left knee as current injury, subsequent encounter  -     Case request operating room: ARTHROSCOPY KNEE- Left Knee partial medial menisectomy vs meniscus repair, all associated procedures; Standing  -     Ambulatory referral to [facility]; Future  -     Comprehensive metabolic panel; Future  -     CBC and differential; Future  -     EKG 12 lead; Future  -     XR chest pa & lateral; Future; Expected date: 06/30/2022  -     Case request operating room: ARTHROSCOPY KNEE- Left Knee partial medial menisectomy vs meniscus repair, all associated procedures        52 y o  male with Left knee medial meniscus tear and continued pain s/p corticosteroid injection performed on 05/19/2022 and formal physical therapy  Patient does display symptoms of medial meniscus tear on exam though also discussed surgical intervention likely would not relieve his moderate hypersensitivity over anterior knee  In depth conversation had with patient regarding nonoperative vs surgical management of Left knee  Nonoperative treatment consisting of outpatient PT, repeat injection therapy, ice application  Also discussed surgical intervention by means of Left knee arthroscopic partial medial menisectomy, possible meniscus repair, all associated procedures  Risks of surgical intervention discussed with patient, significant other and WC , including but not limited to injury to surrounding structure, infection, failure of repair if indicated, stiffness, swelling, persistent pain, progression of osteoarthritis, wound healing complications, DVT, PE, anesthesia complications  Discussed intraoperative findings, weightbearing status with menisectomy vs meniscus repair, DVT prophylaxis, smoking cessation pre operatively, outpatient PT and back to work/function with menisectomy vs meniscus repair   Verbalized understanding of the above and would like to proceed forward with surgical intervention  Patient still experiences pain with ambulation and deep knee flexion  Detailed consent obtained in office today  Patient will go to OR with Dr Adriel Knox on 07/08/2022  Work note provided to patient  In the meantime, advised patient to continue to work on range of motion of Left knee and quad strengthening  Avoid deep knee flexion, twisting and cutting  History of the Present Illness   Hemal Gibson is a 52 y o  male with Left knee medial meniscus tear and significant pain out of proportion to exam and imaging studies s/p corticosteroid injection performed on 05/19/2022  Today, patient reports no pain relief from previous injection, not even one day  He still attends outpatient PT about 3 days a week  He denies locking symptoms, but states occasionally he needs to stop walking and "hold onto something" because of pain  Patient is still not at work  Review of Systems     Review of Systems   Constitutional: Negative for chills and fever  HENT: Negative for ear pain and sore throat  Eyes: Negative for pain and visual disturbance  Respiratory: Negative for cough and shortness of breath  Cardiovascular: Negative for chest pain and palpitations  Gastrointestinal: Negative for abdominal pain and vomiting  Genitourinary: Negative for dysuria and hematuria  Musculoskeletal: Negative for arthralgias and back pain  Skin: Negative for color change and rash  Neurological: Negative for seizures and syncope  All other systems reviewed and are negative  Physical Exam     /99   Pulse 92   Ht 6' 3" (1 905 m)   Wt 111 kg (244 lb 9 6 oz)   BMI 30 57 kg/m²     Left Knee  Range of motion from 0 to 125 with posterior knee pain with terminal flexion    There is no effusion      There is tenderness over the posteromedial and lateral joint line    The patient is able to perform a straight leg raise with 5/5 quad strength  Thea test positive  Varus stress testing reveals no pain or laxity at 0 and 30 degrees   Valgus stress testing reveals no pain or laxity at 0 and 30 degrees  The patient is neurovascular intact distally  Data Review     I have personally reviewed pertinent films in PACS, and my interpretation follows  No new images  Previous MRI of Left knee demonstrates posterior horn medial meniscus tear  Social History     Tobacco Use    Smoking status: Current Every Day Smoker     Packs/day: 1 00     Types: Cigarettes    Smokeless tobacco: Never Used   Vaping Use    Vaping Use: Former   Substance Use Topics    Alcohol use:  Yes    Drug use: No           Procedures  None       Genevieve Rodriguez PA-C

## 2022-06-30 NOTE — PROGRESS NOTES
Patient Name:  Carlin Decker  MRN:  [id number]    [address] [place] [address]     1  Other tear of medial meniscus of left knee as current injury, subsequent encounter  -     Case request operating room: ARTHROSCOPY KNEE- Left Knee partial medial menisectomy vs meniscus repair, all associated procedures; Standing  -     Ambulatory referral to [facility]; Future  -     Comprehensive metabolic panel; Future  -     CBC and differential; Future  -     EKG 12 lead; Future  -     XR chest pa & lateral; Future; Expected date: 06/30/2022  -     Case request operating room: ARTHROSCOPY KNEE- Left Knee partial medial menisectomy vs meniscus repair, all associated procedures        52 y o  male with Left knee medial meniscus tear and continued pain s/p corticosteroid injection performed on 05/19/2022 and formal physical therapy  Patient does display symptoms of medial meniscus tear on exam though also discussed surgical intervention likely would not relieve his moderate hypersensitivity over anterior knee  In depth conversation had with patient regarding nonoperative vs surgical management of Left knee  Nonoperative treatment consisting of outpatient PT, repeat injection therapy, ice application  Also discussed surgical intervention by means of Left knee arthroscopic partial medial menisectomy, possible meniscus repair, all associated procedures  Risks of surgical intervention discussed with patient, significant other and WC , including but not limited to injury to surrounding structure, infection, failure of repair if indicated, stiffness, swelling, persistent pain, progression of osteoarthritis, wound healing complications, DVT, PE, anesthesia complications  Discussed intraoperative findings, weightbearing status with menisectomy vs meniscus repair, DVT prophylaxis, smoking cessation pre operatively, outpatient PT and back to work/function with menisectomy vs meniscus repair   Verbalized understanding of the above and would like to proceed forward with surgical intervention  Patient still experiences pain with ambulation and deep knee flexion  Detailed consent obtained in office today  Patient will go to OR with Dr Bud Rockwell on 07/08/2022  Work note provided to patient  In the meantime, advised patient to continue to work on range of motion of Left knee and quad strengthening  Avoid deep knee flexion, twisting and cutting  History of the Present Illness   Bing Arguello is a 52 y o  male with Left knee medial meniscus tear and significant pain out of proportion to exam and imaging studies s/p corticosteroid injection performed on 05/19/2022  Today, patient reports no pain relief from previous injection, not even one day  He still attends outpatient PT about 3 days a week  He denies locking symptoms, but states occasionally he needs to stop walking and "hold onto something" because of pain  Patient is still not at work  Review of Systems     Review of Systems   Constitutional: Negative for chills and fever  HENT: Negative for ear pain and sore throat  Eyes: Negative for pain and visual disturbance  Respiratory: Negative for cough and shortness of breath  Cardiovascular: Negative for chest pain and palpitations  Gastrointestinal: Negative for abdominal pain and vomiting  Genitourinary: Negative for dysuria and hematuria  Musculoskeletal: Negative for arthralgias and back pain  Skin: Negative for color change and rash  Neurological: Negative for seizures and syncope  All other systems reviewed and are negative  Physical Exam     /99   Pulse 92   Ht 6' 3" (1 905 m)   Wt 111 kg (244 lb 9 6 oz)   BMI 30 57 kg/m²     Left Knee  Range of motion from 0 to 125 with posterior knee pain with terminal flexion    There is no effusion      There is tenderness over the posteromedial and lateral joint line    The patient is able to perform a straight leg raise with 5/5 quad strength  Thea test positive  Varus stress testing reveals no pain or laxity at 0 and 30 degrees   Valgus stress testing reveals no pain or laxity at 0 and 30 degrees  The patient is neurovascular intact distally  Data Review     I have personally reviewed pertinent films in PACS, and my interpretation follows  No new images  Previous MRI of Left knee demonstrates posterior horn medial meniscus tear  Social History     Tobacco Use    Smoking status: Current Every Day Smoker     Packs/day: 1 00     Types: Cigarettes    Smokeless tobacco: Never Used   Vaping Use    Vaping Use: Former   Substance Use Topics    Alcohol use:  Yes    Drug use: No           Procedures  None       Jerry Guerrier PA-C

## 2022-07-01 ENCOUNTER — TELEPHONE (OUTPATIENT)
Dept: OBGYN CLINIC | Facility: HOSPITAL | Age: 47
End: 2022-07-01

## 2022-07-01 NOTE — TELEPHONE ENCOUNTER
DR Rodo Mccoy  RE: Katerina Chaves + work status   CB# [address] work status to:    FAX# [phone]  Claim# [id number]

## 2022-07-05 ENCOUNTER — TELEPHONE (OUTPATIENT)
Dept: OBGYN CLINIC | Facility: CLINIC | Age: 47
End: 2022-07-05

## 2022-07-05 RX ORDER — ACETAMINOPHEN 500 MG
500 TABLET ORAL EVERY 6 HOURS PRN
COMMUNITY

## 2022-07-05 NOTE — TELEPHONE ENCOUNTER
Spoke with the pt that I left several message for Garima Leonardcarolin with no return call  Did speak with Bisi Aviles at the claim dept and verify that the claim is open and active and the left knee is covered under the claim  Pt will reach out to his workers comp claim dept and get back to me    Minoo Gomez

## 2022-07-05 NOTE — PRE-PROCEDURE INSTRUCTIONS
Pre-Surgery Instructions:   Medication Instructions    acetaminophen (TYLENOL) 500 mg tablet Uses PRN- OK to take day of surgery    hydrOXYzine pamoate (VISTARIL) 50 mg capsule Take day of surgery   IBUPROFEN PO Pt reports LD 7/5 and now on hold    metoprolol succinate (TOPROL-XL) 25 mg 24 hr tablet Take day of surgery   VITAMIN E PO Pt reports LD 7/5 and now on hold      Patient  instructed on use of chlorhexidine soap per hospital protocol    Patient instructed to stop all ASA, NSAIDS, vitamins and herbal supplements from now to surgery or per Dr Vivek Mora

## 2022-07-05 NOTE — TELEPHONE ENCOUNTER
PT called me back to let me know that he has spoke with Yonny Marcano his  on the case and no Brian  will be required  As the pt to have her call me back    Leah Stanley

## 2022-07-06 ENCOUNTER — ANESTHESIA EVENT (OUTPATIENT)
Dept: PERIOP | Facility: HOSPITAL | Age: 47
End: 2022-07-06
Payer: COMMERCIAL

## 2022-07-08 ENCOUNTER — ANESTHESIA (OUTPATIENT)
Dept: PERIOP | Facility: HOSPITAL | Age: 47
End: 2022-07-08
Payer: COMMERCIAL

## 2022-07-08 ENCOUNTER — HOSPITAL ENCOUNTER (OUTPATIENT)
Facility: HOSPITAL | Age: 47
Setting detail: OUTPATIENT SURGERY
Discharge: HOME | End: 2022-07-08
Attending: ORTHOPAEDIC SURGERY | Admitting: ORTHOPAEDIC SURGERY
Payer: COMMERCIAL

## 2022-07-08 VITALS
HEART RATE: 77 BPM | HEIGHT: 75 IN | WEIGHT: 239.86 LBS | BODY MASS INDEX: 29.82 KG/M2 | SYSTOLIC BLOOD PRESSURE: 121 MMHG | DIASTOLIC BLOOD PRESSURE: 62 MMHG | RESPIRATION RATE: 12 BRPM | OXYGEN SATURATION: 93 % | TEMPERATURE: 98.4 F

## 2022-07-08 DIAGNOSIS — S83.242D OTH TEAR OF MEDIAL MENISCUS, CURRENT INJURY, LEFT KNEE, SUBS: ICD-10-CM

## 2022-07-08 DIAGNOSIS — T65.291A TOXIC EFFECT OF TOBACCO AND NICOTINE, ACCIDENTAL OR UNINTENTIONAL, INITIAL ENCOUNTER: Primary | ICD-10-CM

## 2022-07-08 PROBLEM — I10 HTN (HYPERTENSION): Status: ACTIVE | Noted: 2022-07-08

## 2022-07-08 PROBLEM — IMO0001 SMOKING: Status: ACTIVE | Noted: 2022-07-08

## 2022-07-08 PROBLEM — F17.200 SMOKING: Status: ACTIVE | Noted: 2022-07-08

## 2022-07-08 LAB
ANION GAP SERPL CALCULATED.3IONS-SCNC: 9 MMOL/L (ref 4–13)
BUN SERPL-MCNC: 22 MG/DL (ref 5–25)
CALCIUM SERPL-MCNC: 9.2 MG/DL (ref 8.3–10.1)
CHLORIDE SERPL-SCNC: 103 MMOL/L (ref 100–108)
CO2 SERPL-SCNC: 25 MMOL/L (ref 21–32)
CREAT SERPL-MCNC: 1.33 MG/DL (ref 0.6–1.3)
GFR SERPL CREATININE-BSD FRML MDRD: 63 ML/MIN/1.73SQ M
GLUCOSE P FAST SERPL-MCNC: 114 MG/DL (ref 65–99)
GLUCOSE SERPL-MCNC: 114 MG/DL (ref 65–140)
POTASSIUM SERPL-SCNC: 5 MMOL/L (ref 3.5–5.3)
SODIUM SERPL-SCNC: 137 MMOL/L (ref 136–145)

## 2022-07-08 PROCEDURE — C1713 ANCHOR/SCREW BN/BN,TIS/BN: HCPCS | Performed by: ORTHOPAEDIC SURGERY

## 2022-07-08 PROCEDURE — 29881 ARTHRS KNE SRG MNISECTMY M/L: CPT | Performed by: PHYSICIAN ASSISTANT

## 2022-07-08 PROCEDURE — 80048 BASIC METABOLIC PNL TOTAL CA: CPT | Performed by: ANESTHESIOLOGY

## 2022-07-08 PROCEDURE — 29881 ARTHRS KNE SRG MNISECTMY M/L: CPT | Performed by: ORTHOPAEDIC SURGERY

## 2022-07-08 RX ORDER — DEXAMETHASONE SODIUM PHOSPHATE 4 MG/ML
INJECTION, SOLUTION INTRA-ARTICULAR; INTRALESIONAL; INTRAMUSCULAR; INTRAVENOUS; SOFT TISSUE AS NEEDED
Status: DISCONTINUED | OUTPATIENT
Start: 2022-07-08 | End: 2022-07-08

## 2022-07-08 RX ORDER — MIDAZOLAM HYDROCHLORIDE 2 MG/2ML
INJECTION, SOLUTION INTRAMUSCULAR; INTRAVENOUS AS NEEDED
Status: DISCONTINUED | OUTPATIENT
Start: 2022-07-08 | End: 2022-07-08

## 2022-07-08 RX ORDER — DEXMEDETOMIDINE HYDROCHLORIDE 100 UG/ML
INJECTION, SOLUTION INTRAVENOUS AS NEEDED
Status: DISCONTINUED | OUTPATIENT
Start: 2022-07-08 | End: 2022-07-08

## 2022-07-08 RX ORDER — LIDOCAINE HYDROCHLORIDE 10 MG/ML
INJECTION, SOLUTION EPIDURAL; INFILTRATION; INTRACAUDAL; PERINEURAL AS NEEDED
Status: DISCONTINUED | OUTPATIENT
Start: 2022-07-08 | End: 2022-07-08

## 2022-07-08 RX ORDER — ONDANSETRON 2 MG/ML
4 INJECTION INTRAMUSCULAR; INTRAVENOUS ONCE AS NEEDED
Status: DISCONTINUED | OUTPATIENT
Start: 2022-07-08 | End: 2022-07-08 | Stop reason: HOSPADM

## 2022-07-08 RX ORDER — IBUPROFEN 400 MG/1
800 TABLET ORAL ONCE
Status: DISCONTINUED | OUTPATIENT
Start: 2022-07-08 | End: 2022-07-08 | Stop reason: HOSPADM

## 2022-07-08 RX ORDER — OXYCODONE HYDROCHLORIDE AND ACETAMINOPHEN 5; 325 MG/1; MG/1
1 TABLET ORAL ONCE
Status: COMPLETED | OUTPATIENT
Start: 2022-07-08 | End: 2022-07-08

## 2022-07-08 RX ORDER — FENTANYL CITRATE/PF 50 MCG/ML
50 SYRINGE (ML) INJECTION
Status: DISCONTINUED | OUTPATIENT
Start: 2022-07-08 | End: 2022-07-08 | Stop reason: HOSPADM

## 2022-07-08 RX ORDER — ONDANSETRON 2 MG/ML
4 INJECTION INTRAMUSCULAR; INTRAVENOUS EVERY 6 HOURS PRN
Status: DISCONTINUED | OUTPATIENT
Start: 2022-07-08 | End: 2022-07-08 | Stop reason: HOSPADM

## 2022-07-08 RX ORDER — HYDROMORPHONE HCL/PF 1 MG/ML
0.5 SYRINGE (ML) INJECTION
Status: DISCONTINUED | OUTPATIENT
Start: 2022-07-08 | End: 2022-07-08 | Stop reason: HOSPADM

## 2022-07-08 RX ORDER — CHLORHEXIDINE GLUCONATE 0.12 MG/ML
15 RINSE ORAL ONCE
Status: COMPLETED | OUTPATIENT
Start: 2022-07-08 | End: 2022-07-08

## 2022-07-08 RX ORDER — GLYCOPYRROLATE 0.2 MG/ML
INJECTION INTRAMUSCULAR; INTRAVENOUS AS NEEDED
Status: DISCONTINUED | OUTPATIENT
Start: 2022-07-08 | End: 2022-07-08

## 2022-07-08 RX ORDER — BUPIVACAINE HYDROCHLORIDE 5 MG/ML
INJECTION, SOLUTION EPIDURAL; INTRACAUDAL AS NEEDED
Status: DISCONTINUED | OUTPATIENT
Start: 2022-07-08 | End: 2022-07-08 | Stop reason: HOSPADM

## 2022-07-08 RX ORDER — PROPOFOL 10 MG/ML
INJECTION, EMULSION INTRAVENOUS AS NEEDED
Status: DISCONTINUED | OUTPATIENT
Start: 2022-07-08 | End: 2022-07-08

## 2022-07-08 RX ORDER — SODIUM CHLORIDE, SODIUM LACTATE, POTASSIUM CHLORIDE, CALCIUM CHLORIDE 600; 310; 30; 20 MG/100ML; MG/100ML; MG/100ML; MG/100ML
125 INJECTION, SOLUTION INTRAVENOUS CONTINUOUS
Status: DISCONTINUED | OUTPATIENT
Start: 2022-07-08 | End: 2022-07-08 | Stop reason: HOSPADM

## 2022-07-08 RX ORDER — OXYCODONE HYDROCHLORIDE AND ACETAMINOPHEN 5; 325 MG/1; MG/1
1 TABLET ORAL EVERY 6 HOURS PRN
Qty: 7 TABLET | Refills: 0 | Status: SHIPPED | OUTPATIENT
Start: 2022-07-08

## 2022-07-08 RX ORDER — ASPIRIN 81 MG/1
81 TABLET ORAL 2 TIMES DAILY
Qty: 28 TABLET | Refills: 0 | Status: SHIPPED | OUTPATIENT
Start: 2022-07-08

## 2022-07-08 RX ORDER — ONDANSETRON 4 MG/1
4 TABLET, ORALLY DISINTEGRATING ORAL EVERY 6 HOURS PRN
Qty: 20 TABLET | Refills: 0 | Status: SHIPPED | OUTPATIENT
Start: 2022-07-08

## 2022-07-08 RX ORDER — FENTANYL CITRATE 50 UG/ML
INJECTION, SOLUTION INTRAMUSCULAR; INTRAVENOUS AS NEEDED
Status: DISCONTINUED | OUTPATIENT
Start: 2022-07-08 | End: 2022-07-08

## 2022-07-08 RX ORDER — CEFAZOLIN SODIUM 2 G/50ML
2000 SOLUTION INTRAVENOUS ONCE
Status: COMPLETED | OUTPATIENT
Start: 2022-07-08 | End: 2022-07-08

## 2022-07-08 RX ADMIN — DEXMEDETOMIDINE HCL 8 MCG: 100 INJECTION INTRAVENOUS at 08:24

## 2022-07-08 RX ADMIN — LIDOCAINE HYDROCHLORIDE 50 MG: 10 INJECTION, SOLUTION EPIDURAL; INFILTRATION; INTRACAUDAL; PERINEURAL at 07:56

## 2022-07-08 RX ADMIN — FENTANYL CITRATE 50 MCG: 50 INJECTION, SOLUTION INTRAMUSCULAR; INTRAVENOUS at 07:55

## 2022-07-08 RX ADMIN — Medication 20 MG: at 08:34

## 2022-07-08 RX ADMIN — DEXAMETHASONE SODIUM PHOSPHATE 4 MG: 4 INJECTION, SOLUTION INTRAMUSCULAR; INTRAVENOUS at 08:12

## 2022-07-08 RX ADMIN — PROPOFOL 250 MG: 10 INJECTION, EMULSION INTRAVENOUS at 07:57

## 2022-07-08 RX ADMIN — MIDAZOLAM HYDROCHLORIDE 2 MG: 1 INJECTION, SOLUTION INTRAMUSCULAR; INTRAVENOUS at 07:52

## 2022-07-08 RX ADMIN — OXYCODONE HYDROCHLORIDE AND ACETAMINOPHEN 1 TABLET: 5; 325 TABLET ORAL at 09:45

## 2022-07-08 RX ADMIN — FENTANYL CITRATE 50 MCG: 50 INJECTION INTRAMUSCULAR; INTRAVENOUS at 08:57

## 2022-07-08 RX ADMIN — FENTANYL CITRATE 25 MCG: 50 INJECTION, SOLUTION INTRAMUSCULAR; INTRAVENOUS at 08:33

## 2022-07-08 RX ADMIN — SODIUM CHLORIDE, SODIUM LACTATE, POTASSIUM CHLORIDE, AND CALCIUM CHLORIDE 125 ML/HR: .6; .31; .03; .02 INJECTION, SOLUTION INTRAVENOUS at 08:50

## 2022-07-08 RX ADMIN — CHLORHEXIDINE GLUCONATE 0.12% ORAL RINSE 15 ML: 1.2 LIQUID ORAL at 07:06

## 2022-07-08 RX ADMIN — DEXMEDETOMIDINE HCL 4 MCG: 100 INJECTION INTRAVENOUS at 08:32

## 2022-07-08 RX ADMIN — HYDROMORPHONE HYDROCHLORIDE 0.5 MG: 1 INJECTION, SOLUTION INTRAMUSCULAR; INTRAVENOUS; SUBCUTANEOUS at 09:16

## 2022-07-08 RX ADMIN — SODIUM CHLORIDE, SODIUM LACTATE, POTASSIUM CHLORIDE, AND CALCIUM CHLORIDE 125 ML/HR: .6; .31; .03; .02 INJECTION, SOLUTION INTRAVENOUS at 07:08

## 2022-07-08 RX ADMIN — FENTANYL CITRATE 25 MCG: 50 INJECTION, SOLUTION INTRAMUSCULAR; INTRAVENOUS at 08:44

## 2022-07-08 RX ADMIN — FENTANYL CITRATE 50 MCG: 50 INJECTION INTRAMUSCULAR; INTRAVENOUS at 09:07

## 2022-07-08 RX ADMIN — CEFAZOLIN SODIUM 2000 MG: 2 SOLUTION INTRAVENOUS at 08:01

## 2022-07-08 RX ADMIN — GLYCOPYRROLATE 0.1 MG: 0.2 INJECTION, SOLUTION INTRAMUSCULAR; INTRAVENOUS at 08:31

## 2022-07-08 NOTE — INTERVAL H&P NOTE
H&P reviewed  After examining the patient I find no changes in the patients condition since the H&P had been written  Patient was seen and evaluated in office where continued conservative versus surgical management of left knee meniscus tear was discussed in depth patient  In light of patient's prolonged left knee pain despite outpatient PT, corticosteroid injection, over-the-counter oral analgesics, patient like to proceed forward with surgical intervention  Risks and benefits were discussed in office as displayed in previous note and detailed consent was obtained and signed in office  Patient verbalized understanding the above and will go to OR with Dr Conor Rubio on 07/08/2022 for left knee arthroscopy, partial medial meniscectomy versus possible meniscus repair, all associated procedures      Fourteen point review of systems performed in office  Musculoskeletal: Left knee pain    Heart RRR  Lungs CTA BL    Vitals:    07/08/22 0640   BP: 133/85   Pulse: 86   Resp: 20   Temp: 97 9 °F (36 6 °C)   SpO2: 99%

## 2022-07-08 NOTE — ANESTHESIA PREPROCEDURE EVALUATION
Procedure:  ARTHROSCOPY KNEE- Left Knee partial medial menisectomy vs meniscus repair, all associated procedures (Left Knee)    Relevant Problems   ANESTHESIA (within normal limits)      CARDIO   (+) HTN (hypertension)      ENDO (within normal limits)      HEMATOLOGY (within normal limits)      PULMONARY   (+) Smoking        Physical Exam    Airway    Mallampati score: I  TM Distance: >3 FB  Neck ROM: full     Dental   Comment: Some broken teeth in the back  ,     Cardiovascular  Rhythm: regular, Rate: normal,     Pulmonary  Breath sounds clear to auscultation,     Other Findings        Anesthesia Plan  ASA Score- 2     Anesthesia Type- general with ASA Monitors  Additional Monitors:   Airway Plan: LMA  Plan Factors-Exercise tolerance (METS): >4 METS  Chart reviewed  Existing labs reviewed  Patient summary reviewed  Patient is a current smoker  Patient smoked on day of surgery  Obstructive sleep apnea risk education given perioperatively  Induction- intravenous  Postoperative Plan- Plan for postoperative opioid use  Planned trial extubation    Informed Consent- Anesthetic plan and risks discussed with patient  I personally reviewed this patient with the CRNA  Discussed and agreed on the Anesthesia Plan with the CRNA  Aisha Patel

## 2022-07-08 NOTE — DISCHARGE INSTRUCTIONS
Knee Surgery:  Postoperative Instructions  Dr Vince Rosa may resume your regular diet as soon as possible    Medication    Take the pain medication as prescribed   Take pain medication with food   While taking pain medications, you may NOT operate a vehicle, heavy machinery, or appliances   While taking pain medication, you may NOT drink alcoholic beverages   If you have any reactions to your medications, stop taking them and call my office   If you are not allergic, take one aspirin 81mg twice a day to help prevent blood clots   Please keep in mind that constipation is a very common side effect of taking narcotic pain medication  Take precautions to prevent constipation:  o Drink plenty of water (6-8 glasses of 8 oz  a day)  o Avoid alcohol, caffeine, and dairy products  o Eat plenty of fiber (fruits, vegetables, and whole grains)  o Take an over the counter stool softener (Colace or Dulcolax)    Activity    RANGE OF MOTION   _____ You may bend your knee as much as the dressings will allow     WEIGHT BEARING   _____ You may weight bear as tolerated     You may practice quadriceps muscle tightening and straight leg raises several times every hour  Please continue to move your ankle up and down and tighten and relax your calf muscles several times every hour to help reduce swelling and prevent blood clots  You may use your crutches for balance as needed until your first post operative visit  The optimal position of the leg after surgery is for you to be lying flat with your ankle higher than your knee and higher than your heart, in an effort to reduce swelling  It is important to continuously elevate your knee above your heart until your swelling is completely down  Please keep ice applied to the knee for at least the first 72 hours or as long as pain or swelling persists  Do not apply ice directly to skin, or allow water to leak on your dressing      Showering    You may shower 4 days after surgery unless told otherwise  DO NOT immerse the knee under water and DO NOT rub the incision  Pad the incisions dry and place new Band-Aids over the sutures after showering  If you have a brace, you may remove it to shower  Keep your knee straight in the shower  You may sponge bathe for the first 3 days, taking care to keep the dressing clean and dry  Dressing Care    Keep the dressing clean and dry  It is normal to get some bloody wound seepage through the bandage  DO NOT BE ALARMED  If the dressing gets soaked with wound seepage, please reinforce with a dry sterile dressing  Loosen the ace wrap around your knee if it becomes too tight or painful  Remove all dressings 3 days after surgery  If there is still some wound seepage, apply a fresh STERILE gauze over the incisions and secure with tape or an ace wrap  DO NOT TOUCH OR REMOVE THE SUTURES!! Emergency/Follow-Up   Please notify my office at [phone] if you develop any fever (101 deg or above), unexpected warmth, redness or swelling  Please call if your toes become cold, purple, numb, or there is excessive bleeding  Please call the office within 24 hours at [phone] to schedule a follow up appt within 7-10 days from surgery

## 2022-07-08 NOTE — ANESTHESIA POSTPROCEDURE EVALUATION
Post-Op Assessment Note    CV Status:  Stable    Pain management: adequate     Mental Status:  Alert and awake   Hydration Status:  Euvolemic   PONV Controlled:  Controlled   Airway Patency:  Patent      Post Op Vitals Reviewed: Yes      Staff: CRNA         No complications documented      BP   121/61   Temp   99 4   Pulse  85   Resp   20   SpO2   96

## 2022-07-08 NOTE — OP NOTE
OPERATIVE REPORT  PATIENT NAME: Marijean Holstein    :  1975  MRN: [id number]  Pt Location: [facility] OR ROOM 04    SURGERY DATE: 2022    Surgeon(s) and Role:     * Maricarmen Patel,  - Primary     * Yojana Alejo PA-C - Assisting    Preop Diagnosis:  Other tear of medial meniscus of left knee as current injury, subsequent encounter [S83 242D]    Post-Op Diagnosis Codes:     * Other tear of medial meniscus of left knee as current injury, subsequent encounter [S83 242D]  Osteoarthritis medial compartment left knee    Procedure(s) (LRB):  ARTHROSCOPY KNEE- Left Knee partial medial menisectomy (Left)    Specimen(s):  * No specimens in log *    Estimated Blood Loss:   Minimal    Drains:  * No LDAs found *    Anesthesia Type:   General with 20 cc 0 5% Marcaine local anesthetic    Operative Indications: Other tear of medial meniscus of left knee as current injury, subsequent encounter [S83 242D]  Persistent pain despite nonoperative treatment including oral analgesics, activity modification, formal physical therapy, and injection therapy    Operative Findings:  Degenerative tearing medial meniscus including horizontal component posterior horn and radial component posterior horn body junction  Medial compartment osteoarthritis with area of grade 4 change in the posterior medial tibial plateau proximally 5 x 7 mm in size, diffuse grade 3 change extending more anteriorly in tibial plateau, diffuse grade 2 change medial femoral condyle    Complications:   None    Procedure and Technique:    Antibiotics:  2 g Ancef    Fluids:  700 cc LR    The patient was seen in the preoperative holding area and the appropriate site of surgery was confirmed and marked  Upon bringing the patient back to the operating room she was placed supine on the operating room table  A tourniquet was placed on the thigh of the operative leg and the leg was placed in the appropriate leg webster  The well leg was placed on a well-padded pillow  Esmarch was used to exsanguinate the leg and tourniquet was inflated  The left lower extremity was prepped and draped in the usual sterile fashion  An appropriate preoperative time-out was performed to once again confirm the site of surgery and procedure  A lateral incision was made for an arthroscopic viewing portal and the arthroscopic camera was inserted  A diagnostic arthroscopy was performed and displayed patellofemoral articular cartilage to be intact  A medial arthroscopic portal was made under direct visualization with the aid of an 18 gauge spinal needle  An arthroscopic probe was inserted and the remainder of the diagnostic arthroscopy was performed which further showed diffuse grade 2 degenerative changes over the medial femoral condyle with areas of grade 3 change over the medial tibial plateau and proximally 5 x 7 mm area of grade 4 change over the posterior medial aspect of the medial tibial plateau  Diminutive meniscus was noted posteriorly in the horn extending to the body which was an appearance similar to having a previous partial meniscectomy  There was horizontal tearing with a superior leaflet noted in the posterior body with frayed edges indicative of degenerative tear as well as a radial component of tear at the posterior horn body junction with frayed edges where the meniscus had already appeared to be thinned  Anterior cruciate ligament was noted to be intact  Lateral compartment articular surfaces were intact with small area of grade 2 chondral change in the anterior lateral tibial plateau and mild fraying of anterior horn of lateral meniscus with residual meniscus intact  Attention was turned to the medial meniscus tear  An arthroscopic shaver was inserted to debride loose ends of the superior leaflet of the horizontal tear of the posterior horn of medial meniscus  It was then used to debride loose ends of tear at the radial component at the posterior horn body junction  Articular cartilage defects on the medial femoral condyle and tibial plateau were noted to be stable  Arthroscopic camera was then inserted into the medial portal and shaver was used to further debride the anterior aspect of the tear in the radial component to a smooth contour  The arthroscopic probe was once again inserted to ensure stable edges of the meniscus status post meniscectomy  Arthroscopic incisions were closed with 3-0 nylon suture  20 cc of 0 5% Marcaine local anesthetic were used at the surgical site  A sterile dressing was applied  The patient tolerated the procedure well no complications were noted  The patient was transferred to the PACU without any issue         I was present for the entire procedure, A qualified resident physician was not available and A physician assistant, Caleb Barrientos PA-C, was required during the procedure for patient positioning, assistance with arthroscopic equipment and camera due to the minimally invasive nature of the surgical case and suturing    Patient Disposition:  PACU       SIGNATURE: Sandeep Nassar DO  DATE: July 8, 2022  TIME: 8:44 AM

## 2022-07-21 ENCOUNTER — OFFICE VISIT (OUTPATIENT)
Dept: OBGYN CLINIC | Facility: CLINIC | Age: 47
End: 2022-07-21

## 2022-07-21 VITALS
SYSTOLIC BLOOD PRESSURE: 144 MMHG | WEIGHT: 245.6 LBS | HEART RATE: 86 BPM | DIASTOLIC BLOOD PRESSURE: 100 MMHG | HEIGHT: 75 IN | BODY MASS INDEX: 30.54 KG/M2

## 2022-07-21 DIAGNOSIS — S83.242D OTHER TEAR OF MEDIAL MENISCUS OF LEFT KNEE AS CURRENT INJURY, SUBSEQUENT ENCOUNTER: Primary | ICD-10-CM

## 2022-07-21 DIAGNOSIS — Z98.890 S/P LEFT KNEE ARTHROSCOPY: ICD-10-CM

## 2022-07-21 PROCEDURE — 99024 POSTOP FOLLOW-UP VISIT: CPT | Performed by: ORTHOPAEDIC SURGERY

## 2022-07-21 NOTE — LETTER
July 21, 2022     Patient: Hemal Gibson  YOB: 1975  Date of Visit: 7/21/2022      To Whom it May Concern:    Hemal Gibson is under my professional care  Ashley Bee was seen in my office on 7/21/2022  Ashleymikal Bee is not clear to return to work at this time  He will follow up in office in 4 weeks for reevaluation of Left knee  If you have any questions or concerns, please don't hesitate to call           Sincerely,          Debra Morton,         CC: No Recipients

## 2022-07-21 NOTE — PROGRESS NOTES
Patient Name:  Nitish Godinez  MRN:  [id number]    [address] [place] [address]     1  Other tear of medial meniscus of left knee as current injury, subsequent encounter    2  S/P left knee arthroscopy        52 y o  male approximately 2 week s/p Left  knee arthroscopy, partial medial menisectomy performed on 07/08/2022  Nylon suture removed in office today and reviewed intraoperative images with patient  Overall, patient doing very well s/p surgical intervention  Advised patient to continue with outpatient PT to work on range of motion and strengthening of quads, hamstrings and hip abductors, proper lifting and squatting techniques2 as tolerated  Work note provided to patient today to be excused until follow up in the office  Advised patient at next appointment, will likely progress to going back to work and normal function/duties throughout the day  He will follow up in office in 4 weeks for reevaluation  History of the Present Illness   Nitish Godinez is a 52 y o  male approximately 2 week s/p Left  knee arthroscopy, partial medial menisectomy performed on 07/08/2022  Today, patient reports he is feeling good  He admits to mild stiffness of Left knee and some residual swelling from surgery, but overall improved from pre operative levels  He has been attending outpatient PT to work on range of motion and strengthening  Review of Systems     Review of Systems   Constitutional: Negative for chills and fever  HENT: Negative for ear pain and sore throat  Eyes: Negative for pain and visual disturbance  Respiratory: Negative for cough and shortness of breath  Cardiovascular: Negative for chest pain and palpitations  Gastrointestinal: Negative for abdominal pain and vomiting  Genitourinary: Negative for dysuria and hematuria  Musculoskeletal: Negative for arthralgias and back pain  Skin: Negative for color change and rash  Neurological: Negative for seizures and syncope     All other systems reviewed and are negative  Physical Exam     /100   Pulse 86   Ht 6' 3" (1 905 m)   Wt 111 kg (245 lb 9 6 oz)   BMI 30 70 kg/m²     Left  Knee  Surgical incisions well healing, without signs of infection  Range of motion from 0 to 85  There is trace post operative effusion  The patient is able to perform a straight leg raise with 4+/5 quad strength  Calf soft, compressible and nontender  The patient is neurovascular intact distally  Data Review     I have personally reviewed pertinent films in PACS, and my interpretation follows      No new images    Social History     Tobacco Use    Smoking status: Current Every Day Smoker     Packs/day: 0 50     Types: Cigarettes    Smokeless tobacco: Never Used    Tobacco comment: was 1 ppd and down 1/2ppd--interested smoking cessation   Vaping Use    Vaping Use: Former   Substance Use Topics    Alcohol use: Yes     Comment: socially red wine on occas    Drug use: No           Procedures  None     Mary Jones PA-C

## 2022-07-22 ENCOUNTER — TELEPHONE (OUTPATIENT)
Dept: OBGYN CLINIC | Facility: HOSPITAL | Age: 47
End: 2022-07-22

## 2022-07-22 NOTE — DISCHARGE INSTRUCTIONS
Patient contacted regarding COVID-19 diagnosis  . Discussed COVID-19 related testing which was available at this time. Test results were positive. Patient informed of results, if available? Yes. Ambulatory Care Manager contacted the patient by telephone to perform post discharge assessment. Call within 2 business days of discharge: Yes. Verified name and  with patient as identifiers. Provided introduction to self, and explanation of the CTN/ACM role, and reason for call due to risk factors for infection and/or exposure to COVID-19. Symptoms reviewed with patient who verbalized the following symptoms: cough  Sore throat is better . Due to no new or worsening symptoms encounter was not routed to provider for escalation. Discussed follow-up appointments. If no appointment was previously scheduled, appointment scheduling offered: No.  [facility] follow up appointment(s):   Future Appointments   Date Time Provider Silvio Fowler   2022  9:30 AM Natasha Cooney, [address] [facility]   2022  1:15 PM Mark Trimble MD [facility] [place]   11/10/2022  1:00 PM Natasha Cooney  [address]  follow up appointment(s): na    Non-face-to-face services provided:  Obtained and reviewed discharge summary and/or continuity of care documents     Advance Care Planning:   Does patient have an Advance Directive:  decision maker updated. Educated patient about risk for severe COVID-19 due to risk factors according to CDC guidelines. ACM reviewed discharge instructions, medical action plan and red flag symptoms with the patient who verbalized understanding. Discussed COVID vaccination status: Yes. Education provided on COVID-19 vaccination as appropriate. Discussed exposure protocols and quarantine with CDC Guidelines. Patient was given an opportunity to verbalize any questions and concerns and agrees to contact ACM or health care provider for questions related to their healthcare.     Reviewed Post Operative Instructions    You have had surgery on your arm today, please read and follow the information below:  · Elevate your hand above your elbow during the next 24-48 hours to help with swelling  · Place your hand and arm over your head with motion at your shoulder three times a day  · Do not apply any cream/ointment/oil to your incisions including antibiotics  · Do not soak your hands in standing water (dishwater, tubs, Jacuzzi's, pools, etc ) until given permission (typically 2-3 weeks after injury)    Call the office at [phone]  if you notice any:  · Increased numbness or tingling of your hand or fingers that is not relieved with elevation  · Increasing pain that is not controlled with medication  · Difficulty chewing, breathing, swallowing  · Chest pains or shortness of breath  · Fever over 101 4 degrees  Bandage: Your therapist will remove your bandage at your first therapy appointment  Motion: Move fingers into a fist 5 times a day, DO NOT move any splinted fingers  Weight bearing status: Avoid heavy lifting (>5 pounds) with the extremity that was operated on until follow up appointment  Normal activities of daily living are OK  Ice: Ice for 10 minutes every hour as needed for swelling x 24 hours  Sling: Sling for comfort for 2-3 days    Pain medication: A prescription for pain medication was provided in the office and sent to your pharmacy  After surgery, we would like you to take Ibuprofen 600mg one tablet by mouth every 6 hours with food (at breakfast, lunch and dinner)  AND Tylenol 500 mg one tablet by mouth every 6 hours  (at breakfast, lunch and dinner) for 5-7 days after your surgery  Please take these medication EVERYDAY after surgery for 5-7 days, and not just as needed  You can take these medications at the same time  Taking these medications after surgery will limit your need for prescription pain medication        Follow-up Appointment: 7-10 days with  Donny  Occupational Therapy: as scheduled  If you need help scheduling Therapy, you can call [phone]        Please call the office at [phone] if you have any questions or concerns regarding your post-operative care  and educated patient on any new and changed medications related to discharge diagnosis     Was patient discharged with a pulse oximeter? no      ACM provided contact information. No further follow-up call identified based on severity of symptoms and risk factors.     Reports feeling much better  Sore throat is improved  Taking Mucinex and using IS for breathing  Denies new or worsening symptoms and denies SOB

## 2022-07-28 ENCOUNTER — TELEPHONE (OUTPATIENT)
Dept: OBGYN CLINIC | Facility: HOSPITAL | Age: 47
End: 2022-07-28

## 2022-07-28 NOTE — TELEPHONE ENCOUNTER
Dr Ander Elaine office is calling in regards to a peer to peer for xray , ekg and post op visit   Phone number for peer to peer is [phone]

## 2022-08-22 ENCOUNTER — OFFICE VISIT (OUTPATIENT)
Dept: OBGYN CLINIC | Facility: CLINIC | Age: 47
End: 2022-08-22

## 2022-08-22 VITALS
HEIGHT: 75 IN | SYSTOLIC BLOOD PRESSURE: 151 MMHG | DIASTOLIC BLOOD PRESSURE: 110 MMHG | HEART RATE: 85 BPM | WEIGHT: 245 LBS | BODY MASS INDEX: 30.46 KG/M2

## 2022-08-22 DIAGNOSIS — Z98.890 STATUS POST ARTHROSCOPIC PARTIAL MEDIAL MENISCECTOMY: Primary | ICD-10-CM

## 2022-08-22 DIAGNOSIS — Z98.890 S/P ARTHROSCOPY OF LEFT KNEE: ICD-10-CM

## 2022-08-22 PROCEDURE — 99024 POSTOP FOLLOW-UP VISIT: CPT | Performed by: ORTHOPAEDIC SURGERY

## 2022-08-22 NOTE — PROGRESS NOTES
Patient Name:  Sterling Alejandre  MRN:  [id number]    [address] [place] [address]     1  Status post arthroscopic partial medial meniscectomy  -     Ambulatory Referral to Physical Therapy; Future    2  S/P arthroscopy of left knee  -     Ambulatory Referral to Physical Therapy; Future        52y o  year old male approximately 6 weeks s/p partial left medial meniscectomy performed on 07/07/2022  Overall, patient is doing well status post surgical intervention  Advised the patient to continue with outpatient PT to work on range of motion and strength  Work note was provided today allowing the patient to return to work  He will have limited duty for the next 2 weeks avoiding deep squatting, twisting, and kneeling  After these 2 weeks he can return to work without any restrictions  We will see him back in 6 weeks for repeat evaluation of the left knee  History of the Present Illness   Sterling Alejandre is a 52 y o  male approximately 6 weeks s/p partial left medial meniscectomy performed on 07/07/2022  He reports doing well but notes some soreness after physical therapy  Review of Systems     Review of Systems   Constitutional: Positive for activity change  Negative for chills, fever and unexpected weight change  HENT: Negative for hearing loss, nosebleeds and sore throat  Eyes: Negative for pain, redness and visual disturbance  Respiratory: Negative for cough, shortness of breath and wheezing  Cardiovascular: Negative for chest pain, palpitations and leg swelling  Gastrointestinal: Negative for abdominal pain, nausea and vomiting  Endocrine: Negative for polyphagia and polyuria  Genitourinary: Negative for dysuria and hematuria  Musculoskeletal: Positive for arthralgias and myalgias  See HPI   Skin: Negative for rash and wound  Neurological: Negative for dizziness, numbness and headaches  Psychiatric/Behavioral: Negative for decreased concentration and suicidal ideas   The patient is not nervous/anxious  Physical Exam     BP (!) 151/110   Pulse 85   Ht 6' 3" (1 905 m)   Wt 111 kg (245 lb)   BMI 30 62 kg/m²     Left Knee:   Surgical incisions are healing well for the postoperative course  There is no erythema or drainage present  There is a noeffusion  Range of motion from 0 to 130  There is mild quadriceps atrophy and decreased tone when compared to the contralateral side which is appropriate for the postoperative course  The patient is able to perform a straight leg raise  Calf is soft and nontender  The patient is neurovascular intact distally  Able to complete a body weight squat in the office today      Data Review     I have personally reviewed pertinent films in PACS, and my interpretation follows      No x-rays taken today in the office    Social History     Tobacco Use    Smoking status: Current Every Day Smoker     Packs/day: 0 50     Types: Cigarettes    Smokeless tobacco: Never Used    Tobacco comment: was 1 ppd and down 1/2ppd--interested smoking cessation   Vaping Use    Vaping Use: Former   Substance Use Topics    Alcohol use: Yes     Comment: socially red wine on occas    Drug use: No           Procedures  none    [employer]    I,:  Maribell Johnson am acting as a scribe while in the presence of the attending physician :       I,:  Robert Sandhu, DO personally performed the services described in this documentation    as scribed in my presence :

## 2022-08-22 NOTE — LETTER
August 22, 2022     Patient: Shayy Braun  YOB: 1975  Date of Visit: 8/22/2022      To Whom it May Concern:    Shayy Braun is under my professional care  Sierra Henao was seen in my office on 8/22/2022  Sierra Henao can return to work at this time  However, he should avoid deep squatting, kneeling, and twisting motions for the next 2 weeks  After these 2 weeks of modified work duty he can return to work with out restrictions  If you have any questions or concerns, please don't hesitate to call           Sincerely,          Deborah Santizo, DO

## 2023-04-21 NOTE — H&P (VIEW-ONLY)
"Consult- General Surgery   Chuyita Erickson 52 y o  male MRN: [id number]  Unit/Bed#:  Encounter: [id number]    Assessment/Plan     Assessment:  Incarcerated umbilical hernia, symptomatic  Right lumbar hernia, asymptomatic  History of anxiety  History of depression  History of hypertension  Heavy smoker  Plan:  Medically recurrent and persistent symptoms of incarcerated umbilical hernia I advised the patient to undergo laparoscopic umbilical hernia repair with with mesh in the near future  I discussed the operative procedure, risk, benefits, alternatives and possible complications, he understood and agreed to proceed  History of Present Illness     HPI:  Chuyita Erickson is a 52 y o  male who presents to my office accompanied by his wife for evaluation of umbilical hernia  The patient is known to have umbilical hernia for quite some time, he has been complaining of discomfort and pain for the same time increasing in intensity and the bulge has become bigger  He was painting at home his wife and he noticed a pop when she started hurting more and more  He went to the emergency room on April 14 and further work-up revealed incarcerated umbilical hernia and lumbar hernia  Patient denies having any pain from the right lumbar hernia  He denies having any fever, chills, nausea, vomiting, diarrhea, constipation or any other constitutional symptoms  Review of Systems  The rest of the review of system total of 10 were negative except for the HPI      Historical Information   Past Medical History:   Diagnosis Date   • Anxiety    • Broken tooth    • Chronic pain disorder     knee   • Depression     some days   • Exercise involving walking     limited now due to knee problem--enjoys hunting and outdoors/   • History of COVID-19 12/2021    recovered at home--\"achey and felt rundown, low energy\"   • Hypertension    • Left knee pain    • Teeth missing      Past Surgical History:   Procedure Laterality Date   • BACK SURGERY  " "   • HERNIA REPAIR     • KNEE ARTHROSCOPY     • MENISCECTOMY Left 7/8/2022    Procedure: ARTHROSCOPY KNEE- Left Knee partial medial menisectomy;  Surgeon: Linda Martini DO;  Location: [facility];  Service: Orthopedics   • ND ARTHROSCOPY KNEE W/MENISCUS RPR MEDIAL/LATERAL Right 5/15/2020    Procedure: partial menisectomy;  Surgeon: Michel Cortes MD;  Location: [facility];  Service: Orthopedics   • ND NEUROPLASTY &/TRANSPOSITION ULNAR NERVE ELBOW Left 2/27/2019    Procedure: Dulce Vasquez;  Surgeon: Chanel Brian MD;  Location: [facility];  Service: Orthopedics   • ROTATOR CUFF REPAIR     • TONSILLECTOMY       Social History   Social History     Substance and Sexual Activity   Alcohol Use Yes    Comment: socially red wine on occas     Social History     Substance and Sexual Activity   Drug Use No     Social History     Tobacco Use   Smoking Status Every Day   • Packs/day: 0 50   • Types: Cigarettes   Smokeless Tobacco Never   Tobacco Comments    was 1 ppd and down 1/2ppd--interested smoking cessation     Family History: non-contributory    Meds/Allergies   all medications and allergies reviewed     Current Outpatient Medications:   •  hydrOXYzine pamoate (VISTARIL) 50 mg capsule, Take 50 mg by mouth Three times daily as needed Per pt takes 2x/day, Disp: , Rfl:   •  IBUPROFEN PO, Take by mouth if needed, Disp: , Rfl:   •  metoprolol succinate (TOPROL-XL) 25 mg 24 hr tablet, Take 25 mg by mouth in the morning , Disp: , Rfl:   •  VITAMIN E PO, Take by mouth, Disp: , Rfl:   No Known Allergies    Objective     Current Vitals:   Blood Pressure: 132/84 (04/21/23 1046)  Pulse: 93 (04/21/23 1046)  Temperature: 98 1 °F (36 7 °C) (04/21/23 1046)  Temp Source: Temporal (04/21/23 1046)  Respirations: 16 (04/21/23 1046)  Height: 6' 3\" (190 5 cm) (04/21/23 1046)  Weight - Scale: 112 kg (246 lb 9 6 oz) (04/21/23 1046)  SpO2: 96 % (04/21/23 1046)    Physical Exam  Vitals and nursing note reviewed     Constitutional:  " General: He is not in acute distress  Cardiovascular:      Rate and Rhythm: Normal rate and regular rhythm  Heart sounds: No murmur heard  Pulmonary:      Effort: No respiratory distress  Breath sounds: Rhonchi present  Abdominal:      Comments: Abdomen soft, nondistended and nontender  There is an obvious nonreducible umbilical hernia, somewhat tender to palpation  There is a large right lumbar incision, there is no obvious palpable hernia at this time  There is no obvious visceromegaly or mass palpable  Skin:     General: Skin is warm  Coloration: Skin is not jaundiced  Findings: No erythema or rash  Neurological:      Mental Status: He is alert and oriented to person, place, and time  Cranial Nerves: No cranial nerve deficit  Psychiatric:         Mood and Affect: Mood normal          Behavior: Behavior normal        Imaging: I have personally reviewed pertinent reports  and I have personally reviewed pertinent films in PACS  Procedure: CT abdomen pelvis with contrast    Result Date: 4/13/2023  Narrative: CT ABDOMEN AND PELVIS WITH IV CONTRAST INDICATION:   periumbilical/rlq abdominal pain  Unknown Bruins note from 4/13/2023 was reviewed  Patient has abdominal pain, and difficulty moving bowels last 2 days  Patient has history of a known umbilical hernia  There is no history of surgery  On exam, patient is tender over the right lower quadrant and periumbilical region  COMPARISON:  Abdomen and pelvic CT from 4/16/2020  TECHNIQUE:  CT examination of the abdomen and pelvis was performed  Multiplanar 2D reformatted images were created from the source data  Radiation dose length product (DLP) for this visit:  829 mGy-cm   This examination, like all CT scans performed in the [facility], was performed utilizing techniques to minimize radiation dose exposure, including the use of iterative reconstruction and automated exposure control   IV Contrast:  100 mL of iohexol (OMNIPAQUE) Enteric Contrast:  Enteric contrast was not administered  FINDINGS: ABDOMEN LOWER CHEST:  No clinically significant abnormality identified in the visualized lower chest  LIVER/BILIARY TREE:  One or more subcentimeter sharply circumscribed low-density hepatic lesion(s) are noted, too small to accurately characterize, but statistically most likely to represent subcentimeter hepatic cysts  No suspicious solid hepatic lesion is identified  Hepatic contours are normal   No biliary dilatation  GALLBLADDER:  No calcified gallstones  No pericholecystic inflammatory change  SPLEEN:  Unremarkable  PANCREAS:  Unremarkable  ADRENAL GLANDS:  Unremarkable  KIDNEYS/URETERS:  No hydronephrosis or urinary tract calculus  One or more sharply circumscribed subcentimeter renal hypodensities are present, too small to accurately characterize, and statistically most likely benign findings  According to recent literature (Radiology 2019) no further workup of these findings is recommended  STOMACH AND BOWEL:  There is colonic diverticulosis without evidence of acute diverticulitis  APPENDIX:  A normal appendix was visualized  (Series 2 images 121- 137 ) ABDOMINOPELVIC CAVITY:  No ascites  No pneumoperitoneum  No lymphadenopathy  VESSELS:  Unremarkable for patient's age  PELVIS REPRODUCTIVE ORGANS:  Unremarkable for patient's age  URINARY BLADDER:  Unremarkable  ABDOMINAL WALL/INGUINAL REGIONS:  Unchanged small umbilical hernia containing fat only (series 2 image 104 ) Unchanged small right lumbar hernia containing fat only (series 2 images 100-1 18 ) OSSEOUS STRUCTURES:  No acute fracture or destructive osseous lesion  Impression: No acute pathology in the abdomen or pelvis  A normal appendix is visualized  Colonic diverticulosis without diverticulitis  Unchanged small umbilical and right lumbar hernias ontaining fat only   Workstation performed: [id number]

## 2023-05-03 ENCOUNTER — ANESTHESIA EVENT (OUTPATIENT)
Dept: PERIOP | Facility: HOSPITAL | Age: 48
End: 2023-05-03

## 2023-05-03 NOTE — PRE-PROCEDURE INSTRUCTIONS
Pre-Surgery Instructions:   Medication Instructions   • hydrOXYzine pamoate (VISTARIL) 50 mg capsule Uses PRN- OK to take day of surgery   • IBUPROFEN PO Stop taking 7 days prior to surgery  • metoprolol succinate (TOPROL-XL) 25 mg 24 hr tablet Take day of surgery  Spoke with pt via phone  Advised hospital location will call with arrival time night before surgery  NPO after midnight the night before surgery, including chewing gum and hard candy  A small sip of water is allowed to take approved medications the morning of surgery  Instructed to leave contacts/jewelery/valuables at home  Ok to wear dentures, glasses and hearing aides into the hospital - they will be removed for surgery  No smoking or alcohol consumption 24 hours prior to surgery  Avoid all Aspirin products and OTC Vit/Supp 1 week prior to surgery and avoid NSAIDs 3 days prior to surgery per anesthesia instructions  Tylenol ok to take prn  Showering instructions reviewed for night before and morning of surgery using surgical soap or antibacterial soap  Patient verbalized understanding of all of the above, including medication instructions

## 2023-05-07 NOTE — ANESTHESIA PREPROCEDURE EVALUATION
Procedure: HERNIA REPAIR UMBILICAL LAPAROSCOPIC with mesh (Abdomen)    On metoprolol    Relevant Problems   CARDIO   (+) HTN (hypertension)      PULMONARY   (+) Smoking        Physical Exam    Airway    Mallampati score: II  TM Distance: >3 FB  Neck ROM: full     Dental       Cardiovascular  Rhythm: regular, Rate: normal, Cardiovascular exam normal    Pulmonary  Pulmonary exam normal Breath sounds clear to auscultation,     Other Findings  Poor dentition      Anesthesia Plan  ASA Score- 2     Anesthesia Type- general with ASA Monitors  Additional Monitors:   Airway Plan: ETT  Comment: Risks/benefits and alternatives discussed including likely possibility of PONV and sore throat, as well as the rare possibilities of aspiration, dental/oropharyngeal/ocular injuries, or grave/life threatening anesthetic and surgical emergencies          Plan Factors-Exercise tolerance (METS): >4 METS  Patient summary reviewed  Patient instructed to abstain from smoking on day of procedure  Patient did not smoke on day of surgery  Induction- intravenous  Postoperative Plan- Plan for postoperative opioid use  Planned trial extubation    Informed Consent- Anesthetic plan and risks discussed with patient  I personally reviewed this patient with the CRNA  Discussed and agreed on the Anesthesia Plan with the CRNA  Dee Dee Stroud

## 2023-05-08 ENCOUNTER — HOSPITAL ENCOUNTER (OUTPATIENT)
Facility: HOSPITAL | Age: 48
Setting detail: OUTPATIENT SURGERY
Discharge: HOME | End: 2023-05-08
Attending: SURGERY | Admitting: SURGERY

## 2023-05-08 ENCOUNTER — ANESTHESIA (OUTPATIENT)
Dept: PERIOP | Facility: HOSPITAL | Age: 48
End: 2023-05-08

## 2023-05-08 VITALS
OXYGEN SATURATION: 93 % | HEIGHT: 75 IN | HEART RATE: 87 BPM | BODY MASS INDEX: 29.93 KG/M2 | TEMPERATURE: 98 F | WEIGHT: 240.74 LBS | SYSTOLIC BLOOD PRESSURE: 131 MMHG | DIASTOLIC BLOOD PRESSURE: 82 MMHG | RESPIRATION RATE: 18 BRPM

## 2023-05-08 DIAGNOSIS — K42.0 INCARCERATED UMBILICAL HERNIA: ICD-10-CM

## 2023-05-08 DEVICE — ETHICON SECURESTRAP ABSORBABLE FIXATION DEVICE
Type: IMPLANTABLE DEVICE | Site: UMBILICAL | Status: FUNCTIONAL
Brand: ETHICON SECURESTRAP

## 2023-05-08 DEVICE — VENTRALIGHT ST MESH
Type: IMPLANTABLE DEVICE | Site: UMBILICAL | Status: FUNCTIONAL
Brand: VENTRALIGHT ST

## 2023-05-08 RX ORDER — ONDANSETRON 2 MG/ML
4 INJECTION INTRAMUSCULAR; INTRAVENOUS ONCE AS NEEDED
Status: DISCONTINUED | OUTPATIENT
Start: 2023-05-08 | End: 2023-05-08 | Stop reason: HOSPADM

## 2023-05-08 RX ORDER — TRAMADOL HYDROCHLORIDE 50 MG/1
50 TABLET ORAL EVERY 6 HOURS PRN
Status: DISCONTINUED | OUTPATIENT
Start: 2023-05-08 | End: 2023-05-08 | Stop reason: HOSPADM

## 2023-05-08 RX ORDER — ONDANSETRON 2 MG/ML
4 INJECTION INTRAMUSCULAR; INTRAVENOUS EVERY 8 HOURS PRN
Status: DISCONTINUED | OUTPATIENT
Start: 2023-05-08 | End: 2023-05-08 | Stop reason: HOSPADM

## 2023-05-08 RX ORDER — METOCLOPRAMIDE HYDROCHLORIDE 5 MG/ML
10 INJECTION INTRAMUSCULAR; INTRAVENOUS ONCE AS NEEDED
Status: DISCONTINUED | OUTPATIENT
Start: 2023-05-08 | End: 2023-05-08 | Stop reason: HOSPADM

## 2023-05-08 RX ORDER — HYDROMORPHONE HCL/PF 1 MG/ML
0.5 SYRINGE (ML) INJECTION
Status: COMPLETED | OUTPATIENT
Start: 2023-05-08 | End: 2023-05-08

## 2023-05-08 RX ORDER — SODIUM CHLORIDE, SODIUM LACTATE, POTASSIUM CHLORIDE, CALCIUM CHLORIDE 600; 310; 30; 20 MG/100ML; MG/100ML; MG/100ML; MG/100ML
100 INJECTION, SOLUTION INTRAVENOUS CONTINUOUS
Status: DISCONTINUED | OUTPATIENT
Start: 2023-05-08 | End: 2023-05-08 | Stop reason: HOSPADM

## 2023-05-08 RX ORDER — HYDROMORPHONE HCL/PF 1 MG/ML
0.5 SYRINGE (ML) INJECTION ONCE
Status: COMPLETED | OUTPATIENT
Start: 2023-05-08 | End: 2023-05-08

## 2023-05-08 RX ORDER — ONDANSETRON 2 MG/ML
INJECTION INTRAMUSCULAR; INTRAVENOUS AS NEEDED
Status: DISCONTINUED | OUTPATIENT
Start: 2023-05-08 | End: 2023-05-08

## 2023-05-08 RX ORDER — ROCURONIUM BROMIDE 10 MG/ML
INJECTION, SOLUTION INTRAVENOUS AS NEEDED
Status: DISCONTINUED | OUTPATIENT
Start: 2023-05-08 | End: 2023-05-08

## 2023-05-08 RX ORDER — DEXAMETHASONE SODIUM PHOSPHATE 10 MG/ML
INJECTION, SOLUTION INTRAMUSCULAR; INTRAVENOUS AS NEEDED
Status: DISCONTINUED | OUTPATIENT
Start: 2023-05-08 | End: 2023-05-08

## 2023-05-08 RX ORDER — CEFAZOLIN SODIUM 2 G/50ML
2000 SOLUTION INTRAVENOUS ONCE
Status: COMPLETED | OUTPATIENT
Start: 2023-05-08 | End: 2023-05-08

## 2023-05-08 RX ORDER — SODIUM CHLORIDE, SODIUM LACTATE, POTASSIUM CHLORIDE, CALCIUM CHLORIDE 600; 310; 30; 20 MG/100ML; MG/100ML; MG/100ML; MG/100ML
INJECTION, SOLUTION INTRAVENOUS CONTINUOUS PRN
Status: DISCONTINUED | OUTPATIENT
Start: 2023-05-08 | End: 2023-05-08

## 2023-05-08 RX ORDER — ACETAMINOPHEN 325 MG/1
975 TABLET ORAL ONCE
Status: COMPLETED | OUTPATIENT
Start: 2023-05-08 | End: 2023-05-08

## 2023-05-08 RX ORDER — MIDAZOLAM HYDROCHLORIDE 2 MG/2ML
INJECTION, SOLUTION INTRAMUSCULAR; INTRAVENOUS AS NEEDED
Status: DISCONTINUED | OUTPATIENT
Start: 2023-05-08 | End: 2023-05-08

## 2023-05-08 RX ORDER — LIDOCAINE HYDROCHLORIDE 20 MG/ML
INJECTION, SOLUTION EPIDURAL; INFILTRATION; INTRACAUDAL; PERINEURAL AS NEEDED
Status: DISCONTINUED | OUTPATIENT
Start: 2023-05-08 | End: 2023-05-08

## 2023-05-08 RX ORDER — SODIUM CHLORIDE, SODIUM LACTATE, POTASSIUM CHLORIDE, CALCIUM CHLORIDE 600; 310; 30; 20 MG/100ML; MG/100ML; MG/100ML; MG/100ML
125 INJECTION, SOLUTION INTRAVENOUS
Status: COMPLETED | OUTPATIENT
Start: 2023-05-08 | End: 2023-05-08

## 2023-05-08 RX ORDER — ACETAMINOPHEN AND CODEINE PHOSPHATE 300; 30 MG/1; MG/1
1 TABLET ORAL EVERY 6 HOURS PRN
Qty: 20 TABLET | Refills: 0 | Status: SHIPPED | OUTPATIENT
Start: 2023-05-08 | End: 2023-05-18

## 2023-05-08 RX ORDER — BUPIVACAINE HYDROCHLORIDE 2.5 MG/ML
INJECTION, SOLUTION EPIDURAL; INFILTRATION; INTRACAUDAL AS NEEDED
Status: DISCONTINUED | OUTPATIENT
Start: 2023-05-08 | End: 2023-05-08 | Stop reason: HOSPADM

## 2023-05-08 RX ORDER — PROPOFOL 10 MG/ML
INJECTION, EMULSION INTRAVENOUS AS NEEDED
Status: DISCONTINUED | OUTPATIENT
Start: 2023-05-08 | End: 2023-05-08

## 2023-05-08 RX ORDER — MAGNESIUM HYDROXIDE 1200 MG/15ML
LIQUID ORAL AS NEEDED
Status: DISCONTINUED | OUTPATIENT
Start: 2023-05-08 | End: 2023-05-08 | Stop reason: HOSPADM

## 2023-05-08 RX ORDER — ALBUTEROL SULFATE 2.5 MG/3ML
2.5 SOLUTION RESPIRATORY (INHALATION) ONCE AS NEEDED
Status: DISCONTINUED | OUTPATIENT
Start: 2023-05-08 | End: 2023-05-08 | Stop reason: HOSPADM

## 2023-05-08 RX ORDER — PHENYLEPHRINE HCL IN 0.9% NACL 1 MG/10 ML
SYRINGE (ML) INTRAVENOUS AS NEEDED
Status: DISCONTINUED | OUTPATIENT
Start: 2023-05-08 | End: 2023-05-08

## 2023-05-08 RX ORDER — HEPARIN SODIUM 5000 [USP'U]/ML
5000 INJECTION, SOLUTION INTRAVENOUS; SUBCUTANEOUS ONCE
Status: COMPLETED | OUTPATIENT
Start: 2023-05-08 | End: 2023-05-08

## 2023-05-08 RX ORDER — FENTANYL CITRATE/PF 50 MCG/ML
50 SYRINGE (ML) INJECTION
Status: COMPLETED | OUTPATIENT
Start: 2023-05-08 | End: 2023-05-08

## 2023-05-08 RX ORDER — FENTANYL CITRATE 50 UG/ML
INJECTION, SOLUTION INTRAMUSCULAR; INTRAVENOUS AS NEEDED
Status: DISCONTINUED | OUTPATIENT
Start: 2023-05-08 | End: 2023-05-08

## 2023-05-08 RX ORDER — LIDOCAINE HYDROCHLORIDE 10 MG/ML
0.5 INJECTION, SOLUTION EPIDURAL; INFILTRATION; INTRACAUDAL; PERINEURAL ONCE AS NEEDED
Status: DISCONTINUED | OUTPATIENT
Start: 2023-05-08 | End: 2023-05-08 | Stop reason: HOSPADM

## 2023-05-08 RX ADMIN — MIDAZOLAM 2 MG: 1 INJECTION INTRAMUSCULAR; INTRAVENOUS at 08:25

## 2023-05-08 RX ADMIN — MORPHINE SULFATE 2 MG: 2 INJECTION, SOLUTION INTRAMUSCULAR; INTRAVENOUS at 10:56

## 2023-05-08 RX ADMIN — SODIUM CHLORIDE, SODIUM LACTATE, POTASSIUM CHLORIDE, AND CALCIUM CHLORIDE: .6; .31; .03; .02 INJECTION, SOLUTION INTRAVENOUS at 08:16

## 2023-05-08 RX ADMIN — HEPARIN SODIUM 5000 UNITS: 5000 INJECTION INTRAVENOUS; SUBCUTANEOUS at 07:32

## 2023-05-08 RX ADMIN — FENTANYL CITRATE 25 MCG: 50 INJECTION, SOLUTION INTRAMUSCULAR; INTRAVENOUS at 09:19

## 2023-05-08 RX ADMIN — FENTANYL CITRATE 50 MCG: 50 INJECTION, SOLUTION INTRAMUSCULAR; INTRAVENOUS at 08:25

## 2023-05-08 RX ADMIN — ROCURONIUM BROMIDE 50 MG: 10 INJECTION, SOLUTION INTRAVENOUS at 08:36

## 2023-05-08 RX ADMIN — HYDROMORPHONE HYDROCHLORIDE 0.5 MG: 1 INJECTION, SOLUTION INTRAMUSCULAR; INTRAVENOUS; SUBCUTANEOUS at 10:20

## 2023-05-08 RX ADMIN — LIDOCAINE HYDROCHLORIDE 100 MG: 20 INJECTION, SOLUTION EPIDURAL; INFILTRATION; INTRACAUDAL at 08:34

## 2023-05-08 RX ADMIN — HYDROMORPHONE HYDROCHLORIDE 0.5 MG: 1 INJECTION, SOLUTION INTRAMUSCULAR; INTRAVENOUS; SUBCUTANEOUS at 09:53

## 2023-05-08 RX ADMIN — FENTANYL CITRATE 50 MCG: 50 INJECTION, SOLUTION INTRAMUSCULAR; INTRAVENOUS at 09:40

## 2023-05-08 RX ADMIN — FENTANYL CITRATE 50 MCG: 50 INJECTION, SOLUTION INTRAMUSCULAR; INTRAVENOUS at 09:34

## 2023-05-08 RX ADMIN — CEFAZOLIN SODIUM 2000 MG: 2 SOLUTION INTRAVENOUS at 08:25

## 2023-05-08 RX ADMIN — ONDANSETRON 4 MG: 2 INJECTION INTRAMUSCULAR; INTRAVENOUS at 09:08

## 2023-05-08 RX ADMIN — SUGAMMADEX 200 MG: 100 INJECTION, SOLUTION INTRAVENOUS at 09:14

## 2023-05-08 RX ADMIN — Medication 100 MCG: at 09:00

## 2023-05-08 RX ADMIN — Medication 100 MCG: at 09:14

## 2023-05-08 RX ADMIN — ACETAMINOPHEN 975 MG: 325 TABLET ORAL at 10:19

## 2023-05-08 RX ADMIN — Medication 200 MCG: at 08:45

## 2023-05-08 RX ADMIN — SODIUM CHLORIDE, SODIUM LACTATE, POTASSIUM CHLORIDE, AND CALCIUM CHLORIDE 125 ML/HR: .6; .31; .03; .02 INJECTION, SOLUTION INTRAVENOUS at 07:32

## 2023-05-08 RX ADMIN — FENTANYL CITRATE 25 MCG: 50 INJECTION, SOLUTION INTRAMUSCULAR; INTRAVENOUS at 09:10

## 2023-05-08 RX ADMIN — FENTANYL CITRATE 50 MCG: 50 INJECTION, SOLUTION INTRAMUSCULAR; INTRAVENOUS at 09:25

## 2023-05-08 RX ADMIN — HYDROMORPHONE HYDROCHLORIDE 0.5 MG: 1 INJECTION, SOLUTION INTRAMUSCULAR; INTRAVENOUS; SUBCUTANEOUS at 09:45

## 2023-05-08 RX ADMIN — SODIUM CHLORIDE, POTASSIUM CHLORIDE, SODIUM LACTATE AND CALCIUM CHLORIDE 100 ML/HR: 600; 310; 30; 20 INJECTION, SOLUTION INTRAVENOUS at 09:30

## 2023-05-08 RX ADMIN — DEXAMETHASONE SODIUM PHOSPHATE 10 MG: 10 INJECTION, SOLUTION INTRAMUSCULAR; INTRAVENOUS at 08:34

## 2023-05-08 RX ADMIN — PROPOFOL 200 MG: 10 INJECTION, EMULSION INTRAVENOUS at 08:34

## 2023-05-08 RX ADMIN — PROPOFOL 100 MG: 10 INJECTION, EMULSION INTRAVENOUS at 08:36

## 2023-05-08 RX ADMIN — FENTANYL CITRATE 50 MCG: 50 INJECTION, SOLUTION INTRAMUSCULAR; INTRAVENOUS at 09:22

## 2023-05-08 NOTE — ANESTHESIA POSTPROCEDURE EVALUATION
Post-Op Assessment Note    CV Status:  Stable    Pain management: inadequate     Mental Status:  Alert and awake   Hydration Status:  Euvolemic   PONV Controlled:  Controlled   Airway Patency:  Patent  Airway: intubated      Post Op Vitals Reviewed: Yes      Staff: CRNA, Anesthesiologist   Reason for prolonged intubation > 24 hours:  N/aReason for prolonged intubation > 48 hours:  N/a      No notable events documented      BP   156/100   Temp     Pulse  75   Resp   16   SpO2   98

## 2023-05-08 NOTE — OP NOTE
OPERATIVE REPORT  PATIENT NAME: Suzy Lugo    :  1975  MRN: [id number]  Pt Location: [facility]OM 02    SURGERY DATE: 2023    Surgeon(s) and Role:     * Alba Hodge MD - Primary     * Walter Dill PA-C - Assisting    Preop Diagnosis:  Incarcerated umbilical hernia [J98 3]    Post-Op Diagnosis Codes:     * Incarcerated umbilical hernia [I46 9]    Procedure(s): HERNIA REPAIR UMBILICAL LAPAROSCOPIC with mesh    Specimen(s):  None    Estimated Blood Loss:   Minimal    Drains:  None    Anesthesia Type:   General    Operative Indications:  Incarcerated umbilical hernia [Q23 9]    Operative Findings:  Incarcerated umbilical hernia, with incarceration of preperitoneal fat, measuring approximately 2 5 cm  The rest of abdominal cavity showed no evidence of inflammatory or neoplastic process  Complications:   None    Procedure and Technique:  The patient was identified and then the patient was taken to the operating room and placed in the operating table in a supine position  After adequate anesthesia induction and satisfactory endotracheal intubation the abdomen was prepped and draped under sterile usual fashion with Chloraprep  Timeout was called the patient was identified as well as the surgical site  An incision was made in the right upper quadrant with scalpel, 5 mm trocar was introdued under direct vision with the help of the Visiport  After verifying the positioning the abdomen was insuflated with CO2  After obtaining adequate pneumoperitoneum the scope was advanced and exploration of the abdomen was performed with the above findings  The patient was placed on Trendelenburg and airplane to the left and then an incision was made on right lower quadrant, 11 mm trocar was placed on the right lower quadrant under direct vision  5 mm trocar was placed on the left side of the abdomen under direct vision      The hernia sac and contents were dissected with cautery and hook and subsequently removed  11 cm circular ventral light mesh was placed and tacked to the anterior abdominal wall with 0 Nurolon using transfascial U stitch fashion at the 6 and 12:00 position with the help of the suture Passer  The rest of the mesh edge was tacked up to the anterior abdominal wall with secure strap  No evidence of bleeding was noted from the secure strap then at this point the ports were removed under direct vision without evidence of bleeding from the abdominal wall  The subcutaneous tissue on all incisions were infiltrated with 0 25% of Marcaine and the skin on all incisions were closed with 4-0 Vicryl in a continuous subcuticular fashion  Sterile dressings were applied  At the end of the case instrument, needles and sponges counts were correct  The patient tolerated the procedure well and then he was transferred to recovery room in a stable conditions  I was present for the entire procedure , A qualified resident physician was not available  and A physician assistant was required during the procedure for retraction, tissue handling, dissection and suturing      Patient Disposition:  PACU , hemodynamically stable and extubated and stable        SIGNATURE: Alba Hodge MD  DATE: May 8, 2023  TIME: 9:12 AM

## 2023-05-08 NOTE — DISCHARGE INSTR - AVS FIRST PAGE
SURGERY INSTRUCTIONS    No diet restriction for this surgery  May shower every day starting tomorrow  Remove plastic dressings in 3 days  Remove strips in 7 days  Nothing should be covering incisions 7 days after surgery  Call office to make an appointment in 2 weeks [phone]  Call office with any issues regarding the surgery  No driving, heavy lifting or strenuous exercise for one week  Resume home medications starting today  Resume blood thinners starting tomorrow  (Aspirin, Coumadin, Eliquis, Xarelto)  Apply ice to the incisions  10 minutes every hour for 2 days  May take Tylenol 3, regular Tylenol or ibuprofen for pain  Alternating narcotics with ibuprofen or Advil will have better pain control    May take Colace for constipation  If you experience urinary retention, please contact our office to be treated  After LAPAROSCOPIC surgery you may experience shoulder pain that usually resolves in 2-3 days or taking plain Tylenol

## 2024-01-28 ENCOUNTER — HOSPITAL ENCOUNTER (EMERGENCY)
Facility: HOSPITAL | Age: 49
Discharge: HOME | End: 2024-01-28
Attending: EMERGENCY MEDICINE
Payer: COMMERCIAL

## 2024-01-28 VITALS
TEMPERATURE: 97.6 F | RESPIRATION RATE: 18 BRPM | DIASTOLIC BLOOD PRESSURE: 84 MMHG | OXYGEN SATURATION: 95 % | SYSTOLIC BLOOD PRESSURE: 123 MMHG | HEART RATE: 77 BPM

## 2024-01-28 DIAGNOSIS — S29.011A MUSCLE STRAIN OF CHEST WALL, INITIAL ENCOUNTER: ICD-10-CM

## 2024-01-28 DIAGNOSIS — R07.9 CHEST PAIN: Primary | ICD-10-CM

## 2024-01-28 DIAGNOSIS — R07.89 LEFT-SIDED CHEST WALL PAIN: ICD-10-CM

## 2024-01-28 LAB
ANION GAP SERPL CALCULATED.3IONS-SCNC: 6 MMOL/L
ATRIAL RATE: 91 BPM
BASOPHILS # BLD AUTO: 0.06 THOUSANDS/ÂΜL (ref 0–0.1)
BASOPHILS NFR BLD AUTO: 1 % (ref 0–1)
BUN SERPL-MCNC: 21 MG/DL (ref 5–25)
CALCIUM SERPL-MCNC: 9.3 MG/DL (ref 8.4–10.2)
CARDIAC TROPONIN I PNL SERPL HS: 3 NG/L
CHLORIDE SERPL-SCNC: 105 MMOL/L (ref 96–108)
CO2 SERPL-SCNC: 28 MMOL/L (ref 21–32)
CREAT SERPL-MCNC: 1.31 MG/DL (ref 0.6–1.3)
EOSINOPHIL # BLD AUTO: 0.21 THOUSAND/ÂΜL (ref 0–0.61)
EOSINOPHIL NFR BLD AUTO: 3 % (ref 0–6)
ERYTHROCYTE [DISTWIDTH] IN BLOOD BY AUTOMATED COUNT: 12.9 % (ref 11.6–15.1)
GFR SERPL CREATININE-BSD FRML MDRD: 63 ML/MIN/1.73SQ M
GLUCOSE SERPL-MCNC: 120 MG/DL (ref 65–140)
HCT VFR BLD AUTO: 48.4 % (ref 36.5–49.3)
HGB BLD-MCNC: 16.2 G/DL (ref 12–17)
IMM GRANULOCYTES # BLD AUTO: 0.03 THOUSAND/UL (ref 0–0.2)
IMM GRANULOCYTES NFR BLD AUTO: 0 % (ref 0–2)
LYMPHOCYTES # BLD AUTO: 2.27 THOUSANDS/ÂΜL (ref 0.6–4.47)
LYMPHOCYTES NFR BLD AUTO: 28 % (ref 14–44)
MCH RBC QN AUTO: 29.3 PG (ref 26.8–34.3)
MCHC RBC AUTO-ENTMCNC: 33.5 G/DL (ref 31.4–37.4)
MCV RBC AUTO: 88 FL (ref 82–98)
MONOCYTES # BLD AUTO: 0.53 THOUSAND/ÂΜL (ref 0.17–1.22)
MONOCYTES NFR BLD AUTO: 7 % (ref 4–12)
NEUTROPHILS # BLD AUTO: 5.04 THOUSANDS/ÂΜL (ref 1.85–7.62)
NEUTS SEG NFR BLD AUTO: 61 % (ref 43–75)
NRBC BLD AUTO-RTO: 0 /100 WBCS
P AXIS: 79 DEGREES
PLATELET # BLD AUTO: 264 THOUSANDS/UL (ref 149–390)
PMV BLD AUTO: 9.4 FL (ref 8.9–12.7)
POTASSIUM SERPL-SCNC: 4.2 MMOL/L (ref 3.5–5.3)
PR INTERVAL: 130 MS
QRS AXIS: 86 DEGREES
QRSD INTERVAL: 94 MS
QT INTERVAL: 370 MS
QTC INTERVAL: 455 MS
RBC # BLD AUTO: 5.52 MILLION/UL (ref 3.88–5.62)
SODIUM SERPL-SCNC: 139 MMOL/L (ref 135–147)
T WAVE AXIS: 74 DEGREES
VENTRICULAR RATE: 91 BPM
WBC # BLD AUTO: 8.14 THOUSAND/UL (ref 4.31–10.16)

## 2024-01-28 PROCEDURE — 96374 THER/PROPH/DIAG INJ IV PUSH: CPT

## 2024-01-28 PROCEDURE — 80048 BASIC METABOLIC PNL TOTAL CA: CPT | Performed by: EMERGENCY MEDICINE

## 2024-01-28 PROCEDURE — 93005 ELECTROCARDIOGRAM TRACING: CPT

## 2024-01-28 PROCEDURE — 84484 ASSAY OF TROPONIN QUANT: CPT | Performed by: EMERGENCY MEDICINE

## 2024-01-28 PROCEDURE — 99285 EMERGENCY DEPT VISIT HI MDM: CPT | Performed by: EMERGENCY MEDICINE

## 2024-01-28 PROCEDURE — 36415 COLL VENOUS BLD VENIPUNCTURE: CPT | Performed by: EMERGENCY MEDICINE

## 2024-01-28 PROCEDURE — 99285 EMERGENCY DEPT VISIT HI MDM: CPT

## 2024-01-28 PROCEDURE — 85025 COMPLETE CBC W/AUTO DIFF WBC: CPT | Performed by: EMERGENCY MEDICINE

## 2024-01-28 RX ORDER — ACETAMINOPHEN 325 MG/1
975 TABLET ORAL ONCE
Status: COMPLETED | OUTPATIENT
Start: 2024-01-28 | End: 2024-01-28

## 2024-01-28 RX ORDER — METHOCARBAMOL 500 MG/1
1000 TABLET, FILM COATED ORAL 3 TIMES DAILY PRN
Qty: 30 TABLET | Refills: 0 | Status: SHIPPED | OUTPATIENT
Start: 2024-01-28

## 2024-01-28 RX ORDER — KETOROLAC TROMETHAMINE 30 MG/ML
15 INJECTION, SOLUTION INTRAMUSCULAR; INTRAVENOUS ONCE
Status: COMPLETED | OUTPATIENT
Start: 2024-01-28 | End: 2024-01-28

## 2024-01-28 RX ADMIN — KETOROLAC TROMETHAMINE 15 MG: 30 INJECTION, SOLUTION INTRAMUSCULAR; INTRAVENOUS at 12:49

## 2024-01-28 RX ADMIN — ACETAMINOPHEN 975 MG: 325 TABLET, FILM COATED ORAL at 12:49

## 2024-01-28 NOTE — ED PROVIDER NOTES
"History  Chief Complaint   Patient presents with    Chest Pain     Chest pain started this past week under the armpit and the left arm and travels up to the neck. Denies N/V     48-year-old male history of hypertension presenting with left-sided chest pain.  Patient reports pain beginning about a week ago and reports the pain is constant and occasionally gets worse.  Pain exacerbated by movement of left arm and palpation.  Pain extends from just under his left armpit and around to the anterior part of his chest with occasional pain shooting up towards his shoulder.  Denies any shortness of breath.  Denies any association with exertion, diaphoresis, nausea/vomiting, lightheadedness/syncope.  Left-hand-dominant.  Denies any other complaints.  Chart reviewed.    Past Medical History:  No date: Anxiety  No date: Broken tooth  No date: Chronic pain disorder      Comment:  knee  No date: Depression      Comment:  some days  No date: Exercise involving walking      Comment:  limited now due to knee problem--enjoys hunting and                outdoors/  12/2021: History of COVID-19      Comment:  recovered at home--\"achey and felt rundown, low energy\"  No date: Hypertension  No date: Left knee pain  No date: Teeth missing  Family History: non-contributory  Social History          Prior to Admission Medications   Prescriptions Last Dose Informant Patient Reported? Taking?   IBUPROFEN PO  Self Yes No   Sig: Take by mouth if needed   VITAMIN E PO  Self Yes No   Sig: Take by mouth   hydrOXYzine pamoate (VISTARIL) 50 mg capsule  Self Yes No   Sig: Take 50 mg by mouth Three times daily as needed Per pt takes 2x/day   metoprolol succinate (TOPROL-XL) 25 mg 24 hr tablet  Self Yes No   Sig: Take 25 mg by mouth in the morning.      Facility-Administered Medications: None       Past Medical History:   Diagnosis Date    Anxiety     Broken tooth     Chronic pain disorder     knee    Depression     some days    Exercise involving walking  " "   limited now due to knee problem--enjoys hunting and outdoors/    History of COVID-19 12/2021    recovered at home--\"achey and felt rundown, low energy\"    Hypertension     Left knee pain     Teeth missing        Past Surgical History:   Procedure Laterality Date    BACK SURGERY      HERNIA REPAIR      KNEE ARTHROSCOPY      MENISCECTOMY Left 7/8/2022    Procedure: ARTHROSCOPY KNEE- Left Knee partial medial menisectomy;  Surgeon: Heber Chopra DO;  [facility]: [facility];  Service: Orthopedics    RI ARTHROSCOPY KNEE W/MENISCUS RPR MEDIAL/LATERAL Right 5/15/2020    Procedure: partial menisectomy;  Surgeon: Georgina Metz MD;  Location[facility];  Service: Orthopedics    RI NEUROPLASTY &/TRANSPOSITION ULNAR NERVE ELBOW Left 2/27/2019    Procedure: CUBITAL TUNNEL RELEASE;  Surgeon: Paulino Hines MD;  [facility];  Service: Orthopedics    ROTATOR CUFF REPAIR      TONSILLECTOMY      UMBILICAL HERNIA REPAIR LAPAROSCOPIC N/A 5/8/2023    Procedure: HERNIA REPAIR UMBILICAL LAPAROSCOPIC with mesh;  Surgeon: Yair Ayers MD;  Location[facility];  Service: General       Family History   Problem Relation Age of Onset    No Known Problems Mother     Allergies Father     Hypertension Father      I have reviewed and agree with the history as documented.    E-Cigarette/Vaping    E-Cigarette Use Former User      E-Cigarette/Vaping Substances    Nicotine No     THC No     CBD No     Flavoring No     Other No     Unknown No      Social History     Tobacco Use    Smoking status: Every Day     Current packs/day: 1.00     Types: Cigarettes    Smokeless tobacco: Never    Tobacco comments:     was 1 ppd and down 1/2ppd   Vaping Use    Vaping status: Former   Substance Use Topics    Alcohol use: Not Currently     Comment: socially red wine on occas    Drug use: No       Review of Systems   Constitutional:  Negative for appetite change, chills, diaphoresis, fever and unexpected weight change.   HENT:  Negative for " congestion and rhinorrhea.    Eyes:  Negative for photophobia and visual disturbance.   Respiratory:  Negative for cough, chest tightness and shortness of breath.    Cardiovascular:  Positive for chest pain. Negative for palpitations and leg swelling.   Gastrointestinal:  Negative for abdominal distention, abdominal pain, blood in stool, constipation, diarrhea, nausea and vomiting.   Genitourinary:  Negative for dysuria and hematuria.   Musculoskeletal:  Negative for back pain, joint swelling, neck pain and neck stiffness.   Skin:  Negative for color change, pallor, rash and wound.   Neurological:  Negative for dizziness, syncope, weakness, light-headedness and headaches.   Psychiatric/Behavioral:  Negative for agitation.    All other systems reviewed and are negative.      Physical Exam  Physical Exam  Vitals and nursing note reviewed.   Constitutional:       General: He is not in acute distress.     Appearance: Normal appearance. He is well-developed. He is not ill-appearing, toxic-appearing or diaphoretic.   HENT:      Head: Normocephalic and atraumatic.      Nose: Nose normal. No congestion or rhinorrhea.      Mouth/Throat:      Mouth: Mucous membranes are moist.      Pharynx: Oropharynx is clear. No oropharyngeal exudate or posterior oropharyngeal erythema.   Eyes:      General: No scleral icterus.        Right eye: No discharge.         Left eye: No discharge.      Extraocular Movements: Extraocular movements intact.      Conjunctiva/sclera: Conjunctivae normal.      Pupils: Pupils are equal, round, and reactive to light.   Neck:      Vascular: No JVD.      Trachea: No tracheal deviation.      Comments: Supple. Normal range of motion.   Cardiovascular:      Rate and Rhythm: Normal rate and regular rhythm.      Heart sounds: Normal heart sounds. No murmur heard.     No friction rub. No gallop.      Comments: Normal rate and regular rhythm  Pulmonary:      Effort: Pulmonary effort is normal. No respiratory  distress.      Breath sounds: Normal breath sounds. No stridor. No wheezing or rales.      Comments: Clear to auscultation bilaterally  Chest:      Chest wall: Tenderness present.      Comments: Isolated tenderness over left pectoralis musculature  Abdominal:      General: Bowel sounds are normal. There is no distension.      Palpations: Abdomen is soft.      Tenderness: There is no abdominal tenderness. There is no right CVA tenderness, left CVA tenderness, guarding or rebound.      Comments: Soft, nontender, nondistended.  Normal bowel sounds throughout   Musculoskeletal:         General: No swelling, tenderness, deformity or signs of injury. Normal range of motion.      Cervical back: Normal range of motion and neck supple. No rigidity. No muscular tenderness.      Right lower leg: No edema.      Left lower leg: No edema.   Lymphadenopathy:      Cervical: No cervical adenopathy.   Skin:     General: Skin is warm and dry.      Coloration: Skin is not pale.      Findings: No erythema or rash.   Neurological:      General: No focal deficit present.      Mental Status: He is alert. Mental status is at baseline.      Sensory: No sensory deficit.      Motor: No weakness or abnormal muscle tone.      Coordination: Coordination normal.      Gait: Gait normal.      Comments: Alert.  Strength and sensation grossly intact.  Ambulatory without difficulty at baseline.    Psychiatric:         Behavior: Behavior normal.         Thought Content: Thought content normal.         Vital Signs  ED Triage Vitals   Temperature Pulse Respirations Blood Pressure SpO2   01/28/24 1218 01/28/24 1218 01/28/24 1218 01/28/24 1218 01/28/24 1218   97.6 °F (36.4 °C) 91 20 145/90 97 %      Temp Source Heart Rate Source Patient Position - Orthostatic VS BP Location FiO2 (%)   01/28/24 1218 01/28/24 1218 01/28/24 1218 01/28/24 1218 --   Temporal Monitor Sitting Left arm       Pain Score       01/28/24 1249       4           Vitals:    01/28/24 1218  01/28/24 1300   BP: 145/90 124/81   Pulse: 91 80   Patient Position - Orthostatic VS: Sitting Lying         Visual Acuity      ED Medications  Medications   ketorolac (TORADOL) injection 15 mg (15 mg Intravenous Given 1/28/24 1249)   acetaminophen (TYLENOL) tablet 975 mg (975 mg Oral Given 1/28/24 1249)       Diagnostic Studies  Results Reviewed       Procedure Component Value Units Date/Time    HS Troponin 0hr (reflex protocol) [[id number]]  (Normal) Collected: 01/28/24 1246    Lab Status: Final result Specimen: Blood from Arm, Right Updated: 01/28/24 1333     hs TnI 0hr 3 ng/L     Basic metabolic panel [[id number]]  (Abnormal) Collected: 01/28/24 1246    Lab Status: Final result Specimen: Blood from Arm, Right Updated: 01/28/24 1317     Sodium 139 mmol/L      Potassium 4.2 mmol/L      Chloride 105 mmol/L      CO2 28 mmol/L      ANION GAP 6 mmol/L      BUN 21 mg/dL      Creatinine 1.31 mg/dL      Glucose 120 mg/dL      Calcium 9.3 mg/dL      eGFR 63 ml/min/1.73sq m     Narrative:      National Kid[employer] guidelines for Chronic Kidney Disease (CKD):     Stage 1 with normal or high GFR (GFR > 90 mL/min/1.73 square meters)    Stage 2 Mild CKD (GFR = 60-89 mL/min/1.73 square meters)    Stage 3A Moderate CKD (GFR = 45-59 mL/min/1.73 square meters)    Stage 3B Moderate CKD (GFR = 30-44 mL/min/1.73 square meters)    Stage 4 Severe CKD (GFR = 15-29 mL/min/1.73 square meters)    Stage 5 End Stage CKD (GFR <15 mL/min/1.73 square meters)  Note: GFR calculation is accurate only with a steady state creatinine    CBC and differential [[id number]] Collected: 01/28/24 1246    Lab Status: Final result Specimen: Blood from Arm, Right Updated: 01/28/24 1254     WBC 8.14 Thousand/uL      RBC 5.52 Million/uL      Hemoglobin 16.2 g/dL      Hematocrit 48.4 %      MCV 88 fL      MCH 29.3 pg      MCHC 33.5 g/dL      RDW 12.9 %      MPV 9.4 fL      Platelets 264 Thousands/uL      nRBC 0 /100 WBCs      Neutrophils Relative 61 %       Immat GRANS % 0 %      Lymphocytes Relative 28 %      Monocytes Relative 7 %      Eosinophils Relative 3 %      Basophils Relative 1 %      Neutrophils Absolute 5.04 Thousands/µL      Immature Grans Absolute 0.03 Thousand/uL      Lymphocytes Absolute 2.27 Thousands/µL      Monocytes Absolute 0.53 Thousand/µL      Eosinophils Absolute 0.21 Thousand/µL      Basophils Absolute 0.06 Thousands/µL                    No orders to display              Procedures  Procedures         ED Course             HEART Risk Score      Flowsheet Row Most Recent Value   Heart Score Risk Calculator    History 0 Filed at: 01/28/2024 1400   ECG 0 Filed at: 01/28/2024 1400   Age 1 Filed at: 01/28/2024 1400   Risk Factors 1 Filed at: 01/28/2024 1400   Troponin 0 Filed at: 01/28/2024 1400   HEART Score 2 Filed at: 01/28/2024 1400                                        Medical Decision Making  48-year-old male history of hypertension presenting with left-sided chest pain.  Reproducible atypical chest wall pain with isolated tenderness over musculature.  Suspect likely pectoralis strain.  Plan for cardiac evaluation including EKG and troponin.  Basic labs.  Symptom management with oral and IV medication.  Reassess.    EKG interpreted me with normal sinus rhythm and no acute ST normality.  Labs interpreted by me without significant acute process.  Symptoms.  With medication.  Suspect likely muscular strain.  Prescription sent to pharmacy.  Muscle relaxant precautions. Discussed results and recommendations. Advised follow up PCP. Medication recommendations. Given instructions and return precautions. Patient/family at bedside acknowledged understanding of all written and verbal instructions and return precautions. Discharged.     Amount and/or Complexity of Data Reviewed  Labs: ordered.    Risk  OTC drugs.  Prescription drug management.             Disposition  Final diagnoses:   Chest pain   Left-sided chest wall pain   Muscle strain of  chest wall, initial encounter     Time reflects when diagnosis was documented in both MDM as applicable and the Disposition within this note       Time User Action Codes Description Comment    1/28/2024 12:51 PM Yuri Murphy Add [R07.9] Chest pain     1/28/2024 12:51 PM Yuri Murphy Add [R07.89] Left-sided chest wall pain     1/28/2024 12:51 PM Yuri Murphy Add [S29.011A] Muscle strain of chest wall, initial encounter           ED Disposition       ED Disposition   Discharge    Condition   Stable    Date/Time   Sun Jan 28, 2024 1349    Comment   Salvatore Austin 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.                   Follow-up Information       Follow up With Specialties Details Why Contact Info    Robert Oreilly MD  Schedule an appointment as soon as possible for a visit in 1 week  [address] [facility] [address]  [phone]      Robert Oreilly MD  Schedule an appointment as soon as possible for a visit in 1 week  [address] [facility] [address]  [phone]              Patient's Medications   Discharge Prescriptions    METHOCARBAMOL (ROBAXIN) 500 MG TABLET    Take 2 tablets (1,000 mg total) by mouth 3 (three) times a day as needed for muscle spasms (Chest wall pain)       Start Date: 1/28/2024 End Date: --       Order Dose: 1,000 mg       Quantity: 30 tablet    Refills: 0       No discharge procedures on file.    PDMP Review       None            ED Provider  Electronically Signed by             Yuri Murphy MD  01/28/24 5968

## 2024-01-28 NOTE — DISCHARGE INSTRUCTIONS
Please follow up PCP.  If taking muscle relaxant Robaxin, please do not drive or operate heavy machinery or perform other dangerous tasks.  Recommend tylenol 650 mg and ibuprofen 600 mg every 6 hours as needed for pain. Please return for severe chest pain, significant shortness of breath, severely worsening symptoms, or any other concerning signs or symptoms. Please refer to the following documents for additional instructions and return precautions.

## 2024-06-04 NOTE — PRE-PROCEDURE INSTRUCTIONS
Pre-Surgery Instructions:   Medication Instructions    gabapentin (NEURONTIN) 300 mg capsule Patient was instructed by Physician and understands 
Warm

## 2024-09-09 ENCOUNTER — OFFICE VISIT (OUTPATIENT)
Dept: SURGERY | Facility: CLINIC | Age: 49
End: 2024-09-09
Payer: COMMERCIAL

## 2024-09-09 VITALS
BODY MASS INDEX: 30.46 KG/M2 | TEMPERATURE: 97.5 F | RESPIRATION RATE: 18 BRPM | HEIGHT: 75 IN | DIASTOLIC BLOOD PRESSURE: 80 MMHG | WEIGHT: 245 LBS | SYSTOLIC BLOOD PRESSURE: 132 MMHG | OXYGEN SATURATION: 97 % | HEART RATE: 89 BPM

## 2024-09-09 DIAGNOSIS — M54.50 LOWER BACK PAIN: ICD-10-CM

## 2024-09-09 DIAGNOSIS — K43.2 INCISIONAL HERNIA: Primary | ICD-10-CM

## 2024-09-09 PROCEDURE — 99213 OFFICE O/P EST LOW 20 MIN: CPT | Performed by: SURGERY

## 2024-09-09 RX ORDER — ATORVASTATIN CALCIUM 20 MG/1
20 TABLET, FILM COATED ORAL DAILY
COMMUNITY
Start: 2024-07-25

## 2024-09-09 RX ORDER — CYCLOBENZAPRINE HCL 10 MG
10 TABLET ORAL DAILY PRN
COMMUNITY
Start: 2024-08-06 | End: 2024-09-09

## 2024-09-09 RX ORDER — DICLOFENAC SODIUM 75 MG/1
75 TABLET, DELAYED RELEASE ORAL 2 TIMES DAILY
COMMUNITY
Start: 2024-08-06

## 2024-09-09 NOTE — PROGRESS NOTES
"Follow Up - General Surgery   Salvatore Avila 49 y.o. male MRN: [id number]  Unit/Bed#:  Encounter: [id number]    Assessment & Plan     Assessment:  Lower back pain, not related to right flank hernia post surgery  Smoker  History of hypertension  History of depression  Plan:  The patient has lower lumbar pain, pain is reproduced with palpation.  Pain is not related to flank hernia at this time.  Patient was recommended follow-up with PCP for further workup of lower lumbar pain.  No further workup or intervention is needed from the incisional hernia from right flank at this time.    History of Present Illness     HPI:  Salvatore Avila is a 49 y.o. male who presents to my office for evaluation of right flank hernia.  The patient stated that he had surgery done for dislocation of the SI joint, he had surgery at the age of 26 with a scar on the right superior iliac spine region, he developed a hernia from it.  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  He does not recall any single event that provoked the pain.  He denies having any nausea, vomiting, diarrhea, constipation or any other constitutional symptoms.  The patient was referred to us for surgical evaluation.  I reviewed CT scan of the abdomen and pelvis from April 2023.    Review of Systems  The rest of the review of system total of 10 were negative except for the HPI.    Historical Information   Past Medical History:   Diagnosis Date    Anxiety     Broken tooth     Chronic pain disorder     knee    Depression     some days    Exercise involving walking     limited now due to knee problem--enjoys hunting and outdoors/    History of COVID-19 12/2021    recovered at home--\"achey and felt rundown, low energy\"    Hypertension     Left knee pain     Teeth missing      Past Surgical History:   Procedure Laterality Date    BACK SURGERY      " MARGARITA (HISTORICAL)      KNEE ARTHROSCOPY Bilateral     MENISCECTOMY Left 07/08/2022    Procedure: ARTHROSCOPY KNEE- Left Knee partial medial menisectomy;  Surgeon: Heber Chopra DO;  [facility];  Service: Orthopedics    TN ARTHROSCOPY KNEE W/MENISCUS RPR MEDIAL/LATERAL Right 05/15/2020    Procedure: partial menisectomy;  Surgeon: Georgina Metz MD;  [facility];  Service: Orthopedics    TN NEUROPLASTY &/TRANSPOSITION ULNAR NERVE ELBOW Left 02/27/2019    Procedure: CUBITAL TUNNEL RELEASE;  Surgeon: Paulino Hines MD;  [facility];  Service: Orthopedics    ROTATOR CUFF REPAIR Right     TONSILLECTOMY      UMBILICAL HERNIA REPAIR LAPAROSCOPIC N/A 05/08/2023    Procedure: HERNIA REPAIR UMBILICAL LAPAROSCOPIC with mesh;  Surgeon: Yair Ayers MD;  [facility];  Service: General    WISDOM TOOTH EXTRACTION       Social History   Social History     Substance and Sexual Activity   Alcohol Use Not Currently    Comment: socially red wine on occas     Social History     Substance and Sexual Activity   Drug Use No     Social History     Tobacco Use   Smoking Status Every Day    Current packs/day: 1.00    Types: Cigarettes    Passive exposure: Current   Smokeless Tobacco Never   Tobacco Comments    was 1 ppd and down 1/2ppd     Family History: non-contributory    Meds/Allergies   all medications and allergies reviewed     Current Outpatient Medications:     atorvastatin (LIPITOR) 20 mg tablet, Take 20 mg by mouth daily, Disp: , Rfl:     diclofenac (VOLTAREN) 75 mg EC tablet, Take 75 mg by mouth 2 (two) times a day, Disp: , Rfl:     hydrOXYzine pamoate (VISTARIL) 50 mg capsule, Take 50 mg by mouth Three times daily as needed Per pt takes 2x/day, Disp: , Rfl:     IBUPROFEN PO, Take by mouth if needed, Disp: , Rfl:     methocarbamol (ROBAXIN) 500 mg tablet, Take 2 tablets (1,000 mg total) by mouth 3 (three) times a day as needed for muscle spasms (Chest wall pain), Disp: 30 tablet,  "Rfl: 0    metoprolol succinate (TOPROL-XL) 25 mg 24 hr tablet, Take 25 mg by mouth in the morning., Disp: , Rfl:   No Known Allergies    Objective     Current Vitals:   Blood Pressure: 132/80 (09/09/24 1018)  Pulse: 89 (09/09/24 1018)  Temperature: 97.5 °F (36.4 °C) (09/09/24 1018)  Respirations: 18 (09/09/24 1018)  Height: 6' 3\" (190.5 cm) (09/09/24 1018)  Weight - Scale: 111 kg (245 lb) (09/09/24 1018)  SpO2: 97 % (09/09/24 1018)    Physical Exam  Vitals and nursing note reviewed.   Constitutional:       General: He is not in acute distress.  Cardiovascular:      Rate and Rhythm: Normal rate and regular rhythm.   Pulmonary:      Effort: No respiratory distress.      Breath sounds: Normal breath sounds.   Abdominal:      Comments: Abdomen is soft, nondistended and nontender.  There is a surgical scar on the right superior iliac spine region, incisional hernia noted in the same area, nontender to palpation, easily reducible, appears to be the same size as last year.  There is a point tenderness to palpation in the lower back, lumbar region, no mass palpable, no evidence of lumbar hernia.   Skin:     General: Skin is warm.      Coloration: Skin is not jaundiced.      Findings: No erythema or rash.   Neurological:      Mental Status: He is alert and oriented to person, place, and time.      Cranial Nerves: No cranial nerve deficit.   Psychiatric:         Mood and Affect: Mood normal.         Behavior: Behavior normal.       "

## 2025-02-03 ENCOUNTER — APPOINTMENT (EMERGENCY)
Dept: RADIOLOGY | Facility: HOSPITAL | Age: 50
End: 2025-02-03
Payer: COMMERCIAL

## 2025-02-03 ENCOUNTER — HOSPITAL ENCOUNTER (EMERGENCY)
Facility: HOSPITAL | Age: 50
Discharge: HOME | End: 2025-02-03
Admitting: EMERGENCY MEDICINE
Payer: COMMERCIAL

## 2025-02-03 VITALS
BODY MASS INDEX: 30 KG/M2 | WEIGHT: 240 LBS | SYSTOLIC BLOOD PRESSURE: 144 MMHG | DIASTOLIC BLOOD PRESSURE: 99 MMHG | RESPIRATION RATE: 20 BRPM | HEART RATE: 73 BPM | OXYGEN SATURATION: 96 % | TEMPERATURE: 98.6 F

## 2025-02-03 DIAGNOSIS — R07.9 CHEST PAIN: Primary | ICD-10-CM

## 2025-02-03 LAB
2HR DELTA HS TROPONIN: 0 NG/L
ANION GAP SERPL CALCULATED.3IONS-SCNC: 6 MMOL/L (ref 4–13)
APTT PPP: 28 SECONDS (ref 23–34)
ATRIAL RATE: 75 BPM
ATRIAL RATE: 76 BPM
ATRIAL RATE: 76 BPM
ATRIAL RATE: 80 BPM
BASOPHILS # BLD AUTO: 0.04 THOUSANDS/ΜL (ref 0–0.1)
BASOPHILS NFR BLD AUTO: 1 % (ref 0–1)
BUN SERPL-MCNC: 23 MG/DL (ref 5–25)
CALCIUM SERPL-MCNC: 9.1 MG/DL (ref 8.4–10.2)
CARDIAC TROPONIN I PNL SERPL HS: 3 NG/L (ref ?–50)
CARDIAC TROPONIN I PNL SERPL HS: 3 NG/L (ref ?–50)
CHLORIDE SERPL-SCNC: 107 MMOL/L (ref 96–108)
CO2 SERPL-SCNC: 25 MMOL/L (ref 21–32)
CREAT SERPL-MCNC: 1.31 MG/DL (ref 0.6–1.3)
EOSINOPHIL # BLD AUTO: 0.13 THOUSAND/ΜL (ref 0–0.61)
EOSINOPHIL NFR BLD AUTO: 2 % (ref 0–6)
ERYTHROCYTE [DISTWIDTH] IN BLOOD BY AUTOMATED COUNT: 13.1 % (ref 11.6–15.1)
GFR SERPL CREATININE-BSD FRML MDRD: 63 ML/MIN/1.73SQ M
GLUCOSE SERPL-MCNC: 141 MG/DL (ref 65–140)
HCT VFR BLD AUTO: 42.5 % (ref 36.5–49.3)
HGB BLD-MCNC: 14.3 G/DL (ref 12–17)
IMM GRANULOCYTES # BLD AUTO: 0.02 THOUSAND/UL (ref 0–0.2)
IMM GRANULOCYTES NFR BLD AUTO: 0 % (ref 0–2)
INR PPP: 0.89 (ref 0.85–1.19)
LYMPHOCYTES # BLD AUTO: 1.6 THOUSANDS/ΜL (ref 0.6–4.47)
LYMPHOCYTES NFR BLD AUTO: 25 % (ref 14–44)
MCH RBC QN AUTO: 29.4 PG (ref 26.8–34.3)
MCHC RBC AUTO-ENTMCNC: 33.6 G/DL (ref 31.4–37.4)
MCV RBC AUTO: 87 FL (ref 82–98)
MONOCYTES # BLD AUTO: 0.6 THOUSAND/ΜL (ref 0.17–1.22)
MONOCYTES NFR BLD AUTO: 10 % (ref 4–12)
NEUTROPHILS # BLD AUTO: 3.94 THOUSANDS/ΜL (ref 1.85–7.62)
NEUTS SEG NFR BLD AUTO: 62 % (ref 43–75)
NRBC BLD AUTO-RTO: 0 /100 WBCS
P AXIS: 63 DEGREES
P AXIS: 64 DEGREES
P AXIS: 64 DEGREES
P AXIS: 68 DEGREES
PLATELET # BLD AUTO: 207 THOUSANDS/UL (ref 149–390)
PMV BLD AUTO: 8.9 FL (ref 8.9–12.7)
POTASSIUM SERPL-SCNC: 4.5 MMOL/L (ref 3.5–5.3)
PR INTERVAL: 134 MS
PR INTERVAL: 136 MS
PROTHROMBIN TIME: 12.8 SECONDS (ref 12.3–15)
QRS AXIS: 72 DEGREES
QRS AXIS: 75 DEGREES
QRS AXIS: 76 DEGREES
QRS AXIS: 79 DEGREES
QRSD INTERVAL: 90 MS
QRSD INTERVAL: 90 MS
QRSD INTERVAL: 92 MS
QRSD INTERVAL: 94 MS
QT INTERVAL: 370 MS
QT INTERVAL: 378 MS
QT INTERVAL: 378 MS
QT INTERVAL: 394 MS
QTC INTERVAL: 422 MS
QTC INTERVAL: 425 MS
QTC INTERVAL: 426 MS
QTC INTERVAL: 443 MS
RBC # BLD AUTO: 4.86 MILLION/UL (ref 3.88–5.62)
SODIUM SERPL-SCNC: 138 MMOL/L (ref 135–147)
T WAVE AXIS: 62 DEGREES
T WAVE AXIS: 65 DEGREES
T WAVE AXIS: 66 DEGREES
T WAVE AXIS: 72 DEGREES
VENTRICULAR RATE: 75 BPM
VENTRICULAR RATE: 76 BPM
VENTRICULAR RATE: 76 BPM
VENTRICULAR RATE: 80 BPM
WBC # BLD AUTO: 6.33 THOUSAND/UL (ref 4.31–10.16)

## 2025-02-03 PROCEDURE — 36415 COLL VENOUS BLD VENIPUNCTURE: CPT | Performed by: PHYSICIAN ASSISTANT

## 2025-02-03 PROCEDURE — 93010 ELECTROCARDIOGRAM REPORT: CPT | Performed by: INTERNAL MEDICINE

## 2025-02-03 PROCEDURE — 96374 THER/PROPH/DIAG INJ IV PUSH: CPT

## 2025-02-03 PROCEDURE — 85730 THROMBOPLASTIN TIME PARTIAL: CPT | Performed by: PHYSICIAN ASSISTANT

## 2025-02-03 PROCEDURE — 84484 ASSAY OF TROPONIN QUANT: CPT | Performed by: PHYSICIAN ASSISTANT

## 2025-02-03 PROCEDURE — 96375 TX/PRO/DX INJ NEW DRUG ADDON: CPT

## 2025-02-03 PROCEDURE — 71045 X-RAY EXAM CHEST 1 VIEW: CPT

## 2025-02-03 PROCEDURE — 93005 ELECTROCARDIOGRAM TRACING: CPT

## 2025-02-03 PROCEDURE — 99285 EMERGENCY DEPT VISIT HI MDM: CPT | Performed by: EMERGENCY MEDICINE

## 2025-02-03 PROCEDURE — 99285 EMERGENCY DEPT VISIT HI MDM: CPT

## 2025-02-03 PROCEDURE — 85610 PROTHROMBIN TIME: CPT | Performed by: PHYSICIAN ASSISTANT

## 2025-02-03 PROCEDURE — 85025 COMPLETE CBC W/AUTO DIFF WBC: CPT | Performed by: PHYSICIAN ASSISTANT

## 2025-02-03 PROCEDURE — 80048 BASIC METABOLIC PNL TOTAL CA: CPT | Performed by: PHYSICIAN ASSISTANT

## 2025-02-03 RX ORDER — OXYCODONE AND ACETAMINOPHEN 5; 325 MG/1; MG/1
1 TABLET ORAL EVERY 6 HOURS PRN
Qty: 16 TABLET | Refills: 0 | Status: SHIPPED | OUTPATIENT
Start: 2025-02-03

## 2025-02-03 RX ORDER — FENTANYL CITRATE 50 UG/ML
100 INJECTION, SOLUTION INTRAMUSCULAR; INTRAVENOUS ONCE
Refills: 0 | Status: COMPLETED | OUTPATIENT
Start: 2025-02-03 | End: 2025-02-03

## 2025-02-03 RX ORDER — ACYCLOVIR 800 MG/1
800 TABLET ORAL
Qty: 35 TABLET | Refills: 0 | Status: SHIPPED | OUTPATIENT
Start: 2025-02-03 | End: 2025-02-10

## 2025-02-03 RX ORDER — KETOROLAC TROMETHAMINE 30 MG/ML
30 INJECTION, SOLUTION INTRAMUSCULAR; INTRAVENOUS ONCE
Status: COMPLETED | OUTPATIENT
Start: 2025-02-03 | End: 2025-02-03

## 2025-02-03 RX ORDER — SODIUM CHLORIDE 9 MG/ML
3 INJECTION INTRAVENOUS
Status: DISCONTINUED | OUTPATIENT
Start: 2025-02-03 | End: 2025-02-03 | Stop reason: HOSPADM

## 2025-02-03 RX ORDER — FENTANYL CITRATE 50 UG/ML
100 INJECTION, SOLUTION INTRAMUSCULAR; INTRAVENOUS ONCE
Refills: 0 | Status: DISCONTINUED | OUTPATIENT
Start: 2025-02-03 | End: 2025-02-03

## 2025-02-03 RX ORDER — ASPIRIN 81 MG/1
324 TABLET, CHEWABLE ORAL ONCE
Status: DISCONTINUED | OUTPATIENT
Start: 2025-02-03 | End: 2025-02-03 | Stop reason: ALTCHOICE

## 2025-02-03 RX ADMIN — FENTANYL CITRATE 100 MCG: 50 INJECTION INTRAMUSCULAR; INTRAVENOUS at 11:57

## 2025-02-03 RX ADMIN — KETOROLAC TROMETHAMINE 30 MG: 30 INJECTION, SOLUTION INTRAMUSCULAR at 09:10

## 2025-02-03 NOTE — ED PROVIDER NOTES
Time reflects when diagnosis was documented in both MDM as applicable and the Disposition within this note       Time User Action Codes Description Comment    2/3/2025 12:32 PM SundarKristian Add [R07.9] Chest pain           ED Disposition       ED Disposition   Discharge    Condition   Stable    Date/Time   Mon Feb 3, 2025 12:32 PM    Comment   Salvatore Avila 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.                   Assessment & Plan       Medical Decision Making  Medical Decision Making  49 year old male presenting for chest pain. Intermittent x 2 hours to left chest/axilla, described as sharp,   Not exertional or pleuritic pain. Denies nausea, vomiting, diaphoresis, lightheadedness or syncope.  Currently afebrile and hemodynamically stable.     Differential diagnosis includes but is not limited to: ACS, arrhythmia, pneumonia, pneumothorax, pericarditis, GERD, gastritis, musculoskeletal, anxiety, PE, nonspecific chest pain, doubt but consider aortic dissection    Initial ED plan: Check CBC, BMP, cardiac labs, EKG, and CXR.    Final assessment: Labs unremarkable including normal glucose, renal function, electrolytes. PERC negative making PE very unlikely. EKG reveals NSR without ischemic changes and high sensitivity troponin is normal x 2, CXR is normal. HEART score is 2, low risk for ACS within next 30 days.   Exam is reassuring.  No initial relief with Toradol, but eventual relief with iv fentanyl.  No indication for admission. Dicussed with patient symptoms may be early onset shingles, advised watch for development of rash in next 48-72 hours, prescription in pocket given for acyclovir to start if rash develops.   Percocet for pain.  Standard narcotic precautions given.   Advised close PCP follow-up. ED return precautions discussed. Patient expressed understanding and is agreeable to plan. Patient discharged in stable condition.        Amount and/or Complexity of Data Reviewed  Labs: ordered. Decision-making details  documented in ED Course.  Radiology: ordered.    Risk  Prescription drug management.        ED Course as of 02/03/25 1658   Mon Feb 03, 2025   0913 Basic metabolic panel(!)  Abnormal, creatinine slightly elevated at 1.31, no renal failure   1057 HS Troponin 0hr (reflex protocol)  Negative at 3.  Will obtain 2 hour troponin for trend   1114 Re-evaluation.  Patient reports no relief of pain after toradol.  Will give fentanyl and reassess.  2 hour troponin pending.    1212 HS Troponin I 2hr  Normal at 3, delta is 0        Medications   ketorolac (TORADOL) injection 30 mg (30 mg Intravenous Given 2/3/25 0910)   fentaNYL injection 100 mcg (100 mcg Intravenous Given 2/3/25 1157)       ED Risk Strat Scores   HEART Risk Score      Flowsheet Row Most Recent Value   Heart Score Risk Calculator    History 0 Filed at: 02/03/2025 1212   ECG 0 Filed at: 02/03/2025 1212   Age 1 Filed at: 02/03/2025 1212   Risk Factors 1 Filed at: 02/03/2025 1212   Troponin 0 Filed at: 02/03/2025 1212   HEART Score 2 Filed at: 02/03/2025 1212          HEART Risk Score      Flowsheet Row Most Recent Value   Heart Score Risk Calculator    History 0 Filed at: 02/03/2025 1212   ECG 0 Filed at: 02/03/2025 1212   Age 1 Filed at: 02/03/2025 1212   Risk Factors 1 Filed at: 02/03/2025 1212   Troponin 0 Filed at: 02/03/2025 1212   HEART Score 2 Filed at: 02/03/2025 1212                        PERC Rule for PE      Flowsheet Row Most Recent Value   PERC Rule for PE    Age >=50 0 Filed at: 02/03/2025 1657   HR >=100 0 Filed at: 02/03/2025 1657   O2 Sat on room air < 95% 0 Filed at: 02/03/2025 1657   History of PE or DVT 0 Filed at: 02/03/2025 1657   Recent trauma or surgery 0 Filed at: 02/03/2025 1657   Hemoptysis 0 Filed at: 02/03/2025 1657   Exogenous estrogen 0 Filed at: 02/03/2025 1657   Unilateral leg swelling 0 Filed at: 02/03/2025 1657   PERC Rule for PE Results 0 Filed at: 02/03/2025 1659            SBIRT 20yo+      Flowsheet Row Most Recent Value  "  Initial Alcohol Screen: US AUDIT-C     1. How often do you have a drink containing alcohol? 0 Filed at: 02/03/2025 0845   2. How many drinks containing alcohol do you have on a typical day you are drinking?  0 Filed at: 02/03/2025 0845   3a. Male UNDER 65: How often do you have five or more drinks on one occasion? 0 Filed at: 02/03/2025 0845   Audit-C Score 0 Filed at: 02/03/2025 0845   FEDE: How many times in the past year have you...    Used an illegal drug or used a prescription medication for non-medical reasons? Never Filed at: 02/03/2025 0845                            History of Present Illness       Chief Complaint   Patient presents with    Chest Pain     Pt BIBA from home, sudden onset sharp left anterior CP radiating into arm pit. Pt reports SOB when ambulating. Pain is intermittent. Given 324 ASA by EMS       Past Medical History:   Diagnosis Date    Anxiety     Broken tooth     Chronic pain disorder     knee    Depression     some days    Exercise involving walking     limited now due to knee problem--enjoys hunting and outdoors/    History of COVID-19 12/2021    recovered at home--\"achey and felt rundown, low energy\"    Hypertension     Left knee pain     Teeth missing       Past Surgical History:   Procedure Laterality Date    BACK SURGERY      COLOGUARD (HISTORICAL)      KNEE ARTHROSCOPY Bilateral     MENISCECTOMY Left 07/08/2022    Procedure: ARTHROSCOPY KNEE- Left Knee partial medial menisectomy;  Surgeon: Heber Chopra DO;  Location: [facility];  Service: Orthopedics    GA ARTHROSCOPY KNEE W/MENISCUS RPR MEDIAL/LATERAL Right 05/15/2020    Procedure: partial menisectomy;  Surgeon: Georgina Metz MD;  Location: [facility];  Service: Orthopedics    GA NEUROPLASTY &/TRANSPOSITION ULNAR NERVE ELBOW Left 02/27/2019    Procedure: CUBITAL TUNNEL RELEASE;  Surgeon: Paulino Hines MD;  Location: [facility];  Service: Orthopedics    ROTATOR CUFF REPAIR Right     TONSILLECTOMY      UMBILICAL HERNIA " REPAIR LAPAROSCOPIC N/A 05/08/2023    Procedure: HERNIA REPAIR UMBILICAL LAPAROSCOPIC with mesh;  Surgeon: Yair Ayers MD;  Location: [facility];  Service: General    WISDOM TOOTH EXTRACTION        Family History   Problem Relation Age of Onset    No Known Problems Mother     Allergies Father     Hypertension Father       Social History     Tobacco Use    Smoking status: Every Day     Current packs/day: 1.00     Types: Cigarettes     Passive exposure: Current    Smokeless tobacco: Never    Tobacco comments:     was 1 ppd and down 1/2ppd   Vaping Use    Vaping status: Never Used   Substance Use Topics    Alcohol use: Not Currently     Comment: socially red wine on occas    Drug use: No      E-Cigarette/Vaping    E-Cigarette Use Never User       E-Cigarette/Vaping Substances    Nicotine No     THC No     CBD No     Flavoring No     Other No     Unknown No       I have reviewed and agree with the history as documented.     49 year old male presents to ED for evaluation of sudden onset sharp chest pain to left anterolateral chest radiating to left axilla that started 2 hours prior to arrival.  Described as sharp, intermittent pain associated with mild SOB when ambulating.  Denies fevers, chills, diaphoresis, nausea, vomiting, abdominal pain, weakness, syncope or rash.   Occurred while driving to work, on arrival supervisor called EMS who gave 324 ASA.   Denies fall, injury or identifiable activity causing pain.        History provided by:  Patient   used: No    Chest Pain  Associated symptoms: no abdominal pain, no cough, no diaphoresis, no fever, no nausea, no numbness, no palpitations, no shortness of breath, not vomiting and no weakness        Review of Systems   Constitutional:  Negative for chills, diaphoresis, fever and unexpected weight change.   Eyes:  Negative for visual disturbance.   Respiratory:  Negative for cough, choking, chest tightness, shortness of breath, wheezing and stridor.     Cardiovascular:  Positive for chest pain. Negative for palpitations.   Gastrointestinal:  Negative for abdominal pain, diarrhea, nausea and vomiting.   Genitourinary:  Negative for decreased urine volume, difficulty urinating, hematuria and urgency.   Musculoskeletal:  Negative for gait problem.   Skin:  Negative for rash.   Neurological:  Negative for seizures, syncope, facial asymmetry, weakness and numbness.   All other systems reviewed and are negative.          Objective       ED Triage Vitals   Temperature Pulse Blood Pressure Respirations SpO2 Patient Position - Orthostatic VS   02/03/25 0845 02/03/25 0834 02/03/25 0834 02/03/25 0834 02/03/25 0834 02/03/25 0834   98.6 °F (37 °C) 79 170/99 21 97 % Lying      Temp Source Heart Rate Source BP Location FiO2 (%) Pain Score    02/03/25 0845 02/03/25 0834 02/03/25 0834 -- 02/03/25 0834    Oral Monitor Right arm  7      Vitals      Date and Time Temp Pulse SpO2 Resp BP Pain Score FACES Pain Rating User   02/03/25 1200 -- 73 96 % 20 144/99 -- -- FB   02/03/25 1157 -- -- -- -- -- 7 -- FB   02/03/25 1100 -- 77 97 % 20 144/97 -- -- FB   02/03/25 1030 -- 78 96 % 16 137/91 -- -- FB   02/03/25 1000 -- 81 96 % 16 145/102 -- -- FB   02/03/25 0930 -- 80 96 % 14 145/98 -- -- FB   02/03/25 0910 -- -- -- -- -- 7 -- FB   02/03/25 0900 -- 79 96 % 19 164/86 -- -- FB   02/03/25 0845 98.6 °F (37 °C) 81 96 % 12 170/99 -- -- FB   02/03/25 0834 -- 79 97 % 21 170/99 7 -- FB            Physical Exam  Vitals and nursing note reviewed.   Constitutional:       General: He is not in acute distress.     Appearance: Normal appearance.   HENT:      Head: Normocephalic and atraumatic.      Right Ear: External ear normal.      Left Ear: External ear normal.      Nose: Nose normal.   Eyes:      General: No scleral icterus.  Cardiovascular:      Rate and Rhythm: Normal rate.      Pulses: Normal pulses.   Pulmonary:      Effort: Pulmonary effort is normal.      Breath sounds: Normal breath sounds.    Abdominal:      Tenderness: There is no abdominal tenderness. There is no guarding or rebound.   Musculoskeletal:         General: No deformity or signs of injury. Normal range of motion.      Cervical back: Normal range of motion and neck supple. No rigidity or tenderness.   Skin:     General: Skin is dry.      Capillary Refill: Capillary refill takes less than 2 seconds.      Coloration: Skin is not jaundiced.      Findings: No rash.   Neurological:      General: No focal deficit present.      Mental Status: He is alert and oriented to person, place, and time. Mental status is at baseline.      Motor: No weakness.      Coordination: Coordination normal.      Gait: Gait normal.   Psychiatric:         Mood and Affect: Mood normal.         Behavior: Behavior normal.         Thought Content: Thought content normal.         Results Reviewed       Procedure Component Value Units Date/Time    HS Troponin I 2hr [[id number]]  (Normal) Collected: 02/03/25 1116    Lab Status: Final result Specimen: Blood Updated: 02/03/25 1136     hs TnI 2hr 3 ng/L      Delta 2hr hsTnI 0 ng/L     HS Troponin 0hr (reflex protocol) [[id number]]  (Normal) Collected: 02/03/25 0851    Lab Status: Final result Specimen: Blood from Arm, Right Updated: 02/03/25 0918     hs TnI 0hr 3 ng/L     Protime-INR [[id number]]  (Normal) Collected: 02/03/25 0851    Lab Status: Final result Specimen: Blood from Arm, Right Updated: 02/03/25 0914     Protime 12.8 seconds      INR 0.89    Narrative:      INR Therapeutic Range    Indication                                             INR Range      Atrial Fibrillation                                               2.0-3.0  Hypercoagulable State                                    2.0.2.3  Left Ventricular Asist Device                            2.0-3.0  Mechanical Heart Valve                                  -    Aortic(with afib, MI, embolism, HF, LA enlargement,    and/or coagulopathy)                                      2.0-3.0 (2.5-3.5)     Mitral                                                             2.5-3.5  Prosthetic/Bioprosthetic Heart Valve               2.0-3.0  Venous thromboembolism (VTE: VT, PE        2.0-3.0    APTT [[id number]]  (Normal) Collected: 02/03/25 0851    Lab Status: Final result Specimen: Blood from Arm, Right Updated: 02/03/25 0914     PTT 28 seconds     Basic metabolic panel [[id number]]  (Abnormal) Collected: 02/03/25 0851    Lab Status: Final result Specimen: Blood from Arm, Right Updated: 02/03/25 0910     Sodium 138 mmol/L      Potassium 4.5 mmol/L      Chloride 107 mmol/L      CO2 25 mmol/L      ANION GAP 6 mmol/L      BUN 23 mg/dL      Creatinine 1.31 mg/dL      Glucose 141 mg/dL      Calcium 9.1 mg/dL      eGFR 63 ml/min/1.73sq m     Narrative:      [employer] guidelines for Chronic Kidney Disease (CKD):     Stage 1 with normal or high GFR (GFR > 90 mL/min/1.73 square meters)    Stage 2 Mild CKD (GFR = 60-89 mL/min/1.73 square meters)    Stage 3A Moderate CKD (GFR = 45-59 mL/min/1.73 square meters)    Stage 3B Moderate CKD (GFR = 30-44 mL/min/1.73 square meters)    Stage 4 Severe CKD (GFR = 15-29 mL/min/1.73 square meters)    Stage 5 End Stage CKD (GFR <15 mL/min/1.73 square meters)  Note: GFR calculation is accurate only with a steady state creatinine    CBC and differential [[id number]] Collected: 02/03/25 0851    Lab Status: Final result Specimen: Blood from Arm, Right Updated: 02/03/25 0856     WBC 6.33 Thousand/uL      RBC 4.86 Million/uL      Hemoglobin 14.3 g/dL      Hematocrit 42.5 %      MCV 87 fL      MCH 29.4 pg      MCHC 33.6 g/dL      RDW 13.1 %      MPV 8.9 fL      Platelets 207 Thousands/uL      nRBC 0 /100 WBCs      Segmented % 62 %      Immature Grans % 0 %      Lymphocytes % 25 %      Monocytes % 10 %      Eosinophils Relative 2 %      Basophils Relative 1 %      Absolute Neutrophils 3.94 Thousands/µL      Absolute Immature Grans 0.02 Thousand/uL       Absolute Lymphocytes 1.60 Thousands/µL      Absolute Monocytes 0.60 Thousand/µL      Eosinophils Absolute 0.13 Thousand/µL      Basophils Absolute 0.04 Thousands/µL             X-ray chest 1 view portable   Final Interpretation by Sergio Randle MD (02/03 1053)      No acute cardiopulmonary disease.            Workstation performed: [id number]             ECG 12 Lead Documentation Only    Date/Time: 2/3/2025 8:42 AM    Performed by: Kristian Kwok PA-C  Authorized by: Kristian Kwok PA-C    Indications / Diagnosis:  C/p  ECG reviewed by me, the ED Provider: yes    Patient location:  ED  Previous ECG:     Previous ECG:  Unavailable    Comparison to cardiac monitor: Yes    Interpretation:     Interpretation: normal    Rate:     ECG rate:  80    ECG rate assessment: normal    Rhythm:     Rhythm: sinus rhythm    Ectopy:     Ectopy: none    QRS:     QRS axis:  Normal    QRS intervals:  Normal  Conduction:     Conduction: normal    ST segments:     ST segments:  Normal  T waves:     T waves: normal    ECG 12 Lead Documentation Only    Date/Time: 2/3/2025 10:38 AM    Performed by: Kristian Kwok PA-C  Authorized by: Kristian Kwok PA-C    Indications / Diagnosis:  C/p  ECG reviewed by me, the ED Provider: yes    Patient location:  ED  Previous ECG:     Previous ECG:  Compared to current    Comparison ECG info:  2/3/25 at 0842    Similarity:  No change    Comparison to cardiac monitor: Yes    Interpretation:     Interpretation: normal    Rate:     ECG rate:  75    ECG rate assessment: normal    Rhythm:     Rhythm: sinus rhythm    Ectopy:     Ectopy: none    QRS:     QRS axis:  Normal    QRS intervals:  Normal  Conduction:     Conduction: normal    ST segments:     ST segments:  Normal  T waves:     T waves: normal        ED Medication and Procedure Management   Prior to Admission Medications   Prescriptions Last Dose Informant Patient Reported? Taking?   IBUPROFEN PO  Self Yes No   Sig:  Take by mouth if needed   atorvastatin (LIPITOR) 20 mg tablet  Self Yes No   Sig: Take 20 mg by mouth daily   diclofenac (VOLTAREN) 75 mg EC tablet  Self Yes No   Sig: Take 75 mg by mouth 2 (two) times a day   hydrOXYzine pamoate (VISTARIL) 50 mg capsule  Self Yes No   Sig: Take 50 mg by mouth Three times daily as needed Per pt takes 2x/day   methocarbamol (ROBAXIN) 500 mg tablet  Self No No   Sig: Take 2 tablets (1,000 mg total) by mouth 3 (three) times a day as needed for muscle spasms (Chest wall pain)   metoprolol succinate (TOPROL-XL) 25 mg 24 hr tablet  Self Yes No   Sig: Take 25 mg by mouth in the morning.      Facility-Administered Medications: None     Discharge Medication List as of 2/3/2025 12:36 PM        START taking these medications    Details   acyclovir (ZOVIRAX) 800 mg tablet Take 1 tablet (800 mg total) by mouth 5 (five) times a day for 7 days, Starting Mon 2/3/2025, Until Mon 2/10/2025, Print      oxyCODONE-acetaminophen (PERCOCET) 5-325 mg per tablet Take 1 tablet by mouth every 6 (six) hours as needed for severe pain (chest wall pain/ongoing rx) Label no driving no etoh. Initial rx.  Dx: Max Daily Amount: 4 tablets, Starting Mon 2/3/2025, Normal           CONTINUE these medications which have NOT CHANGED    Details   atorvastatin (LIPITOR) 20 mg tablet Take 20 mg by mouth daily, Starting Thu 7/25/2024, Historical Med      diclofenac (VOLTAREN) 75 mg EC tablet Take 75 mg by mouth 2 (two) times a day, Starting Tue 8/6/2024, Historical Med      hydrOXYzine pamoate (VISTARIL) 50 mg capsule Take 50 mg by mouth Three times daily as needed Per pt takes 2x/day, Starting Mon 4/11/2022, Historical Med      IBUPROFEN PO Take by mouth if needed, Historical Med      methocarbamol (ROBAXIN) 500 mg tablet Take 2 tablets (1,000 mg total) by mouth 3 (three) times a day as needed for muscle spasms (Chest wall pain), Starting Sun 1/28/2024, Normal      metoprolol succinate (TOPROL-XL) 25 mg 24 hr tablet Take 25  mg by mouth in the morning., Starting Mon 4/11/2022, Historical Med           No discharge procedures on file.  ED SEPSIS DOCUMENTATION   Time reflects when diagnosis was documented in both MDM as applicable and the Disposition within this note       Time User Action Codes Description Comment    2/3/2025 12:32 PM Kristian Kwok Add [R07.9] Chest pain                  Kristian Kwok PA-C  02/03/25 [id number]

## 2025-02-03 NOTE — Clinical Note
Salvatore Avila was seen and treated in our emergency department on 2/3/2025.    No restrictions            Diagnosis:     Salvatore  may return to work on return date.    He may return on this date: 02/10/2025         If you have any questions or concerns, please don't hesitate to call.      Kristian Kwok PA-C    ______________________________           _______________          _______________  Hospital Representative                              Date                                Time

## 2025-08-11 ENCOUNTER — HOSPITAL ENCOUNTER (OUTPATIENT)
Dept: RADIOLOGY | Facility: HOSPITAL | Age: 50
Discharge: HOME | End: 2025-08-11
Payer: COMMERCIAL

## (undated) DEVICE — INTENDED FOR TISSUE SEPARATION, AND OTHER PROCEDURES THAT REQUIRE A SHARP SURGICAL BLADE TO PUNCTURE OR CUT.: Brand: BARD-PARKER SAFETY BLADES SIZE 15, STERILE

## (undated) DEVICE — GAUZE SPONGES,16 PLY: Brand: CURITY

## (undated) DEVICE — PENCIL ELECTROSURG E-Z CLEAN -0035H

## (undated) DEVICE — ENDOPATH PNEUMONEEDLE INSUFFLATION NEEDLES WITH LUER LOCK CONNECTORS 120MM: Brand: ENDOPATH

## (undated) DEVICE — TUBING SUCTION 5MM X 12 FT

## (undated) DEVICE — LIGHT HANDLE COVER SLEEVE DISP BLUE STELLAR

## (undated) DEVICE — 3M™ TEGADERM™ TRANSPARENT FILM DRESSING FRAME STYLE, 1624W, 2-3/8 IN X 2-3/4 IN (6 CM X 7 CM), 100/CT 4CT/CASE: Brand: 3M™ TEGADERM™

## (undated) DEVICE — SUT VICRYL 4-0 PS-2 27 IN J426H

## (undated) DEVICE — DRAPE EQUIPMENT RF WAND

## (undated) DEVICE — CUFF TOURNIQUET 34 X 4 IN QUICK CONNECT DISP 1BLA

## (undated) DEVICE — SUT NUROLON 0 CTX C551D

## (undated) DEVICE — LIGHT GLOVE GREEN

## (undated) DEVICE — NEPTUNE E-SEP SMOKE EVACUATION PENCIL, COATED, 70MM BLADE, PUSH BUTTON SWITCH: Brand: NEPTUNE E-SEP

## (undated) DEVICE — CURITY STRETCH BANDAGE: Brand: CURITY

## (undated) DEVICE — SUT FIBERWARE 2-0 MENISCUS REPAIR NEEDLE 38IN AR-7223

## (undated) DEVICE — GLOVE SRG BIOGEL ECLIPSE 7.5

## (undated) DEVICE — CHLORAPREP HI-LITE 26ML ORANGE

## (undated) DEVICE — 3M™ COBAN™ NL STERILE NON-LATEX SELF-ADHERENT WRAP, 2084S, 4 IN X 5 YD (10 CM X 4,5 M), 18 ROLLS/CASE: Brand: 3M™ COBAN™

## (undated) DEVICE — ELECTRODE LAP L WIRE E-Z CLEAN 33CM -0100

## (undated) DEVICE — STERILE BETHLEHEM PLASTIC HAND: Brand: CARDINAL HEALTH

## (undated) DEVICE — INTENDED FOR TISSUE SEPARATION, AND OTHER PROCEDURES THAT REQUIRE A SHARP SURGICAL BLADE TO PUNCTURE OR CUT.: Brand: BARD-PARKER ® CARBON RIB-BACK BLADES

## (undated) DEVICE — 3M™ STERI-STRIP™ REINFORCED ADHESIVE SKIN CLOSURES, R1546, 1/4 IN X 4 IN (6 MM X 100 MM), 10 STRIPS/ENVELOPE: Brand: 3M™ STERI-STRIP™

## (undated) DEVICE — ALLENTOWN LAP CHOLE APP PACK: Brand: CARDINAL HEALTH

## (undated) DEVICE — SCD SEQUENTIAL COMPRESSION COMFORT SLEEVE MEDIUM KNEE LENGTH: Brand: KENDALL SCD

## (undated) DEVICE — BANDAGE, ESMARK LF STR 6"X9' (20/CS): Brand: CYPRESS

## (undated) DEVICE — ABDOMINAL PAD: Brand: DERMACEA

## (undated) DEVICE — GLOVE SRG BIOGEL 7.5

## (undated) DEVICE — BLADE SHAVER TORPEDO 4MM 13CM  COOLCUT

## (undated) DEVICE — PAD GROUNDING ADULT

## (undated) DEVICE — GLOVE SRG BIOGEL 6.5

## (undated) DEVICE — SYRINGE 1ML TB 26G X 3/8 SAFETY

## (undated) DEVICE — GLOVE SRG BIOGEL 9

## (undated) DEVICE — OCCLUSIVE GAUZE STRIP,3% BISMUTH TRIBROMOPHENATE IN PETROLATUM BLEND: Brand: XEROFORM

## (undated) DEVICE — TROCAR: Brand: KII® SLEEVE

## (undated) DEVICE — TROCAR: Brand: KII FIOS FIRST ENTRY

## (undated) DEVICE — IMPERVIOUS STOCKINETTE: Brand: DEROYAL

## (undated) DEVICE — INTENDED FOR TISSUE SEPARATION, AND OTHER PROCEDURES THAT REQUIRE A SHARP SURGICAL BLADE TO PUNCTURE OR CUT.: Brand: BARD-PARKER SAFETY BLADES SIZE 11, STERILE

## (undated) DEVICE — ACE WRAP 6 IN STERILE

## (undated) DEVICE — PAD CAST 3 IN COTTON NON STRL

## (undated) DEVICE — TUBING SMOKE EVAC W/FILTRATION DEVICE PLUMEPORT ACTIV

## (undated) DEVICE — PROBE ABLATION  APOLLO RF 90 DEG MULTI PORT

## (undated) DEVICE — CURITY NON-ADHERENT STRIPS: Brand: CURITY

## (undated) DEVICE — GLOVE INDICATOR PI UNDERGLOVE SZ 7.5 BLUE

## (undated) DEVICE — CLOSURE DEVICE ENDO CLOSE

## (undated) DEVICE — GLOVE SRG BIOGEL 8

## (undated) DEVICE — BETHLEHEM UNIVERSAL  ARTHRO PK: Brand: CARDINAL HEALTH

## (undated) DEVICE — GAUZE SPONGES,8 PLY: Brand: CURITY

## (undated) DEVICE — 3M™ STERI-STRIP™ REINFORCED ADHESIVE SKIN CLOSURES, R1542, 1/4 IN X 1-1/2 IN (6 MM X 38 MM), 6 STRIPS/ENVELOPE: Brand: 3M™ STERI-STRIP™

## (undated) DEVICE — PADDING CAST 4 IN  COTTON STRL

## (undated) DEVICE — ASTOUND SURGICAL GOWN, XXX LARGE, X-LONG: Brand: CONVERTORS

## (undated) DEVICE — TUBING ARTHROSCOPIC WAVE  MAIN PUMP

## (undated) DEVICE — GLOVE INDICATOR PI UNDERGLOVE SZ 9 BLUE

## (undated) DEVICE — GLOVE INDICATOR PI UNDERGLOVE SZ 6.5 BLUE

## (undated) DEVICE — INVIEW CLEAR LEGGINGS: Brand: CONVERTORS

## (undated) DEVICE — BLADE SHAVER TORPEDO CRV 4MM 13MM COOLCUT

## (undated) DEVICE — SUT ETHILON 4-0 PS-2 18 IN 1667H

## (undated) DEVICE — BLADE SHAVER DISSECTOR 3.5MM 13CM COOLCUT

## (undated) DEVICE — METZENBAUM ADTEC SINGLE USE DISSECTING SCISSORS, SHAFT ONLY, MONOPOLAR, CURVED TO LEFT, WORKING LENGTH: 12 1/4", (310 MM), DIAM. 5 MM, INSULATED, DOUBLE ACTION, STERILE, DISPOSABLE, PACKAGE OF 10 PIECES: Brand: AESCULAP

## (undated) DEVICE — [HIGH FLOW INSUFFLATOR,  DO NOT USE IF PACKAGE IS DAMAGED,  KEEP DRY,  KEEP AWAY FROM SUNLIGHT,  PROTECT FROM HEAT AND RADIOACTIVE SOURCES.]: Brand: PNEUMOSURE

## (undated) DEVICE — SUT ETHILON 3-0 PS-1 18 IN 1663H

## (undated) DEVICE — 4-PORT MANIFOLD: Brand: NEPTUNE 2